# Patient Record
Sex: FEMALE | Race: WHITE | Employment: OTHER | URBAN - METROPOLITAN AREA
[De-identification: names, ages, dates, MRNs, and addresses within clinical notes are randomized per-mention and may not be internally consistent; named-entity substitution may affect disease eponyms.]

---

## 2017-08-23 ENCOUNTER — OFFICE VISIT (OUTPATIENT)
Dept: NEPHROLOGY | Facility: CLINIC | Age: 82
End: 2017-08-23

## 2017-08-23 VITALS — SYSTOLIC BLOOD PRESSURE: 110 MMHG | WEIGHT: 128 LBS | DIASTOLIC BLOOD PRESSURE: 56 MMHG | BODY MASS INDEX: 29 KG/M2

## 2017-08-23 DIAGNOSIS — N18.30 CKD (CHRONIC KIDNEY DISEASE) STAGE 3, GFR 30-59 ML/MIN (HCC): ICD-10-CM

## 2017-08-23 DIAGNOSIS — N17.9 AKI (ACUTE KIDNEY INJURY) (HCC): Primary | ICD-10-CM

## 2017-08-23 PROCEDURE — 99214 OFFICE O/P EST MOD 30 MIN: CPT | Performed by: INTERNAL MEDICINE

## 2017-08-23 NOTE — PROGRESS NOTES
Nephrology Progress Note      ASSESSMENT/PLAN:        1) JOSE ANTONIO- recently higher serum creatinine of 2.6 mg/dL likely reflects prerenal azotemia given her relatively modest p.o. intake and ongoing diuretic use.   No other acute insults noted; meds are otherwis CHOLESTEROL    • Lumbar degenerative disc disease    • Lumbar radiculitis    • Lumbar spondylosis    • Osteoarthrosis, unspecified whether generalized or localized, unspecified site    • Other and unspecified hyperlipidemia    • Pain in limb    • Personal [facility]  7/24/2014: VEGA MONTOYA & Timmy Espinal NDL/CATH SPI DX/THER QSY N/A      Comment: Procedure: LUMBAR EPIDURAL;  Surgeon: Teetee Macedo MD;  Location: [facility] FOR PAIN                MANAGEMENT  No date: HYSTERECTOM Procedure: ;  Surgeon: Tahir Jasso MD;                 Location: [address] [place] [address] MANAGEMENT  7/24/2014: M-SEDAJ BY [facility] [address] .Critical access hospital [facility]  [facility] 5+ YR N/A      Comment: Procedure: LUMBAR EPIDURAL;  Surgeon: Chi Escalona MD;  Location: [facility] ORDER FOR IV ANT*      Comment: Procedure: ;  Surgeon: Khadijah Rice MD;                 Location: [address] [place] [address] MANAGEMENT  7/24/2014[facility] N/A      Comment: Procedure: LUMBAR EPIDURAL;  Surgeon: Mainor Paula, Swelling    ROS:     Denies fever/chills  Denies wt loss/gain  Denies HA or visual changes  Denies CP or palpitations  Denies SOB/cough/hemoptysis  Denies abd or flank pain  Denies N/V/D  Denies change in urinary habits or gross hematuria  Denies LE edema

## 2017-09-06 ENCOUNTER — APPOINTMENT (OUTPATIENT)
Dept: LAB | Age: 82
End: 2017-09-06
Attending: INTERNAL MEDICINE
Payer: MEDICARE

## 2017-09-06 DIAGNOSIS — N17.9 AKI (ACUTE KIDNEY INJURY) (HCC): ICD-10-CM

## 2017-09-06 DIAGNOSIS — N18.30 CKD (CHRONIC KIDNEY DISEASE) STAGE 3, GFR 30-59 ML/MIN (HCC): ICD-10-CM

## 2017-09-06 LAB
BUN BLD-MCNC: 32 MG/DL (ref 8–20)
CALCIUM BLD-MCNC: 10.1 MG/DL (ref 8.3–10.3)
CHLORIDE: 106 MMOL/L (ref 101–111)
CO2: 31 MMOL/L (ref 22–32)
CREAT BLD-MCNC: 2.11 MG/DL (ref 0.55–1.02)
GLUCOSE BLD-MCNC: 118 MG/DL (ref 70–99)
POTASSIUM SERPL-SCNC: 4.6 MMOL/L (ref 3.6–5.1)
SODIUM SERPL-SCNC: 142 MMOL/L (ref 136–144)

## 2017-09-06 PROCEDURE — 80048 BASIC METABOLIC PNL TOTAL CA: CPT

## 2017-09-10 ENCOUNTER — TELEPHONE (OUTPATIENT)
Dept: NEPHROLOGY | Facility: CLINIC | Age: 82
End: 2017-09-10

## 2017-09-11 NOTE — TELEPHONE ENCOUNTER
Disucssed with daughter- renal function improved; lytes OK- no other issues per daughter- thx Waylon Course

## 2017-11-19 ENCOUNTER — APPOINTMENT (OUTPATIENT)
Dept: CT IMAGING | Facility: HOSPITAL | Age: 82
End: 2017-11-19
Payer: MEDICARE

## 2017-11-19 ENCOUNTER — APPOINTMENT (OUTPATIENT)
Dept: GENERAL RADIOLOGY | Facility: HOSPITAL | Age: 82
End: 2017-11-19
Payer: MEDICARE

## 2017-11-19 ENCOUNTER — HOSPITAL ENCOUNTER (EMERGENCY)
Facility: HOSPITAL | Age: 82
Discharge: HOME | End: 2017-11-19
Payer: MEDICARE

## 2017-11-19 VITALS
WEIGHT: 128.06 LBS | TEMPERATURE: 97 F | OXYGEN SATURATION: 92 % | SYSTOLIC BLOOD PRESSURE: 144 MMHG | HEART RATE: 67 BPM | DIASTOLIC BLOOD PRESSURE: 48 MMHG | RESPIRATION RATE: 18 BRPM | HEIGHT: 56 IN | BODY MASS INDEX: 28.81 KG/M2

## 2017-11-19 DIAGNOSIS — I16.0 HYPERTENSIVE URGENCY: Primary | ICD-10-CM

## 2017-11-19 PROCEDURE — 96375 TX/PRO/DX INJ NEW DRUG ADDON: CPT

## 2017-11-19 PROCEDURE — 99285 EMERGENCY DEPT VISIT HI MDM: CPT

## 2017-11-19 PROCEDURE — 93005 ELECTROCARDIOGRAM TRACING: CPT

## 2017-11-19 PROCEDURE — 87086 URINE CULTURE/COLONY COUNT: CPT

## 2017-11-19 PROCEDURE — 81001 URINALYSIS AUTO W/SCOPE: CPT

## 2017-11-19 PROCEDURE — 70450 CT HEAD/BRAIN W/O DYE: CPT

## 2017-11-19 PROCEDURE — 87186 SC STD MICRODIL/AGAR DIL: CPT

## 2017-11-19 PROCEDURE — 96376 TX/PRO/DX INJ SAME DRUG ADON: CPT

## 2017-11-19 PROCEDURE — 71010 XR CHEST AP PORTABLE  (CPT=71010): CPT

## 2017-11-19 PROCEDURE — 85025 COMPLETE CBC W/AUTO DIFF WBC: CPT

## 2017-11-19 PROCEDURE — 93010 ELECTROCARDIOGRAM REPORT: CPT

## 2017-11-19 PROCEDURE — 85610 PROTHROMBIN TIME: CPT

## 2017-11-19 PROCEDURE — 85730 THROMBOPLASTIN TIME PARTIAL: CPT

## 2017-11-19 PROCEDURE — 87088 URINE BACTERIA CULTURE: CPT

## 2017-11-19 PROCEDURE — 96374 THER/PROPH/DIAG INJ IV PUSH: CPT

## 2017-11-19 PROCEDURE — 80053 COMPREHEN METABOLIC PANEL: CPT

## 2017-11-19 PROCEDURE — 84484 ASSAY OF TROPONIN QUANT: CPT

## 2017-11-19 RX ORDER — LORAZEPAM 2 MG/ML
0.25 INJECTION INTRAMUSCULAR ONCE
Status: COMPLETED | OUTPATIENT
Start: 2017-11-19 | End: 2017-11-19

## 2017-11-19 RX ORDER — HYDRALAZINE HYDROCHLORIDE 20 MG/ML
10 INJECTION INTRAMUSCULAR; INTRAVENOUS ONCE
Status: COMPLETED | OUTPATIENT
Start: 2017-11-19 | End: 2017-11-19

## 2017-11-19 NOTE — ED PROVIDER NOTES
Patient Seen in: [facility] Emergency Department    History   Patient presents with:  Blood Pressure    Stated Complaint: high blood pressure 204/74 at home    HPI    Patient is a pleasant 80-year-old female with history of hypertension presented to Other and unspecified hyperlipidemia    • Pain in limb    • Personal history of urinary (tract) infection     below th eknee   • Renal disorder     elevated BUN and creatinine   • Shortness of breath     uses oxygen 2.5 L/NC every night for hx of low satur Location: [facility]                MANAGEMENT  No date: HYSTERECTOMY  1974: HYSTERECTOMY  8/30/2013: INJECTION, W/WO CONTRAST, DX/THERAPEUTIC SUBST*      Comment: Procedure: LUMBAR EPIDURAL;  Surgeon: Boris Nagy MD;  Location: LUMBAR EPIDURAL;  Surgeon: Daron Kulkarni MD;  Location: [address] [place] [address]                MANAGEMENT  8/30/2013: PATIENT DOCUMENTED NOT TO HAVE EXPERIENCED ANY*      Comment: Procedure: LUMBAR EPIDURAL;  Surgeon: Marcio Granados FOR IV ANT* N/A      Comment: Procedure: LUMBAR EPIDURAL;  Surgeon: Mabel Roger MD;  Location: [address] [place] [address]                MANAGEMENT  10/7/13: UPPER GI ENDOSCOPY PERFORMED        Smoking status: Never Smoker Course     Labs Reviewed   COMP METABOLIC PANEL (14) - Abnormal; Notable for the following:        Result Value    Glucose 188 (*)     BUN 38 (*)     Creatinine 2.17 (*)     GFR 19 (*)     Albumin 3.3 (*)     All other components within normal limits   URI Report. No acute ST Elevation or Depression, unremarkable EKG with no change compared to August 2016.   This EKG was repeated, at 3:22 AM, because after I told the patient and her daughter that she was going to be discharged home to follow-up within 50 tk were discussed given the patient's ER course. We discussed signs and symptoms that should prompt the patient's immediate return to the emergency department.    Reasonable over the counter and prescription treatment options and Physician follow up plan was

## 2017-11-19 NOTE — ED INITIAL ASSESSMENT (HPI)
Patient saw her PCP Thursday for annual physical- was told her B/P was high then. Her PCP told her that she can add in 12.5 mg or 25 mg of Metoprolol as needed if B/P remains high at home (in addition to normal morning dose of Metoprolol 12.5 mg).  Daughter

## 2018-04-25 ENCOUNTER — HOSPITAL ENCOUNTER (EMERGENCY)
Facility: HOSPITAL | Age: 83
Discharge: LEFT BEFORE DISCHARGE | End: 2018-04-25
Attending: EMERGENCY MEDICINE
Payer: MEDICARE

## 2018-04-25 ENCOUNTER — APPOINTMENT (OUTPATIENT)
Dept: CT IMAGING | Facility: HOSPITAL | Age: 83
End: 2018-04-25
Attending: EMERGENCY MEDICINE
Payer: MEDICARE

## 2018-04-25 ENCOUNTER — PRIOR ORIGINAL RECORDS (OUTPATIENT)
Dept: OTHER | Age: 83
End: 2018-04-25

## 2018-04-25 VITALS
DIASTOLIC BLOOD PRESSURE: 48 MMHG | TEMPERATURE: 98 F | SYSTOLIC BLOOD PRESSURE: 126 MMHG | WEIGHT: 131 LBS | HEART RATE: 61 BPM | BODY MASS INDEX: 29.47 KG/M2 | OXYGEN SATURATION: 96 % | HEIGHT: 56 IN | RESPIRATION RATE: 18 BRPM

## 2018-04-25 DIAGNOSIS — I16.9 HYPERTENSIVE CRISIS: Primary | ICD-10-CM

## 2018-04-25 PROCEDURE — 93010 ELECTROCARDIOGRAM REPORT: CPT

## 2018-04-25 PROCEDURE — 99285 EMERGENCY DEPT VISIT HI MDM: CPT

## 2018-04-25 PROCEDURE — 99291 CRITICAL CARE FIRST HOUR: CPT

## 2018-04-25 PROCEDURE — 81001 URINALYSIS AUTO W/SCOPE: CPT | Performed by: EMERGENCY MEDICINE

## 2018-04-25 PROCEDURE — 87086 URINE CULTURE/COLONY COUNT: CPT | Performed by: EMERGENCY MEDICINE

## 2018-04-25 PROCEDURE — 85025 COMPLETE CBC W/AUTO DIFF WBC: CPT | Performed by: EMERGENCY MEDICINE

## 2018-04-25 PROCEDURE — 96375 TX/PRO/DX INJ NEW DRUG ADDON: CPT

## 2018-04-25 PROCEDURE — 70450 CT HEAD/BRAIN W/O DYE: CPT | Performed by: EMERGENCY MEDICINE

## 2018-04-25 PROCEDURE — 93005 ELECTROCARDIOGRAM TRACING: CPT

## 2018-04-25 PROCEDURE — 80053 COMPREHEN METABOLIC PANEL: CPT | Performed by: EMERGENCY MEDICINE

## 2018-04-25 PROCEDURE — 84484 ASSAY OF TROPONIN QUANT: CPT | Performed by: EMERGENCY MEDICINE

## 2018-04-25 PROCEDURE — 96365 THER/PROPH/DIAG IV INF INIT: CPT

## 2018-04-25 RX ORDER — CLONIDINE HYDROCHLORIDE 0.1 MG/1
0.1 TABLET ORAL 2 TIMES DAILY
Status: DISCONTINUED | OUTPATIENT
Start: 2018-04-25 | End: 2018-04-25

## 2018-04-25 RX ORDER — HYDRALAZINE HYDROCHLORIDE 20 MG/ML
10 INJECTION INTRAMUSCULAR; INTRAVENOUS ONCE
Status: COMPLETED | OUTPATIENT
Start: 2018-04-25 | End: 2018-04-25

## 2018-04-25 NOTE — ED NOTES
RE-EVAL PER MD WITH DETAILED DISCUSSION ON LABS/RADIOGRAPHICS/FINDINGS. QUESTIONS ANSWERED PER NGA LICONA.  TO BE ADMITTED. IN AGREEMENT WITH POC.

## 2018-04-25 NOTE — ED NOTES
WHILE AWAITING BED ASSIGNMENT, DAUGHTER STATES WANTS TO TAKE MOTHER HME DUE TO B/P NORMALIZING. DETAILED DISCUSSION Michelle Brock MD, THIS RN ON CONSEQUENSCE OF LEAVING. AWARE OF SITUATION AND WILLING TO TAKE RESPONSIBILITY.   DISCHARGE PROCESS INITIATED, S

## 2018-04-25 NOTE — ED PROVIDER NOTES
Patient's daughter at bedside states patient has improved blood pressure at this time and wants to take the patient home.   She is refusing admission to the hospital and wants to take the patient home at this time and have her follow-up with her primary car

## 2018-04-25 NOTE — ED INITIAL ASSESSMENT (HPI)
Per family, /82 at home around 2200, \"she complains of feeling hot and not feeling right. \" Pt given Torsemide 20 mg prn at 2230, \"her BP didn't go down.  \" Pt denies CP, denies SHANNA, denies HA, no c/o dizziness/lightheadedness

## 2018-04-25 NOTE — ED PROVIDER NOTES
Patient Seen in: [facility] Emergency Department    History   Patient presents with:  Hypertension (cardiovascular)    Stated Complaint: HTN     HPI    80year-old woman comes in with elevated blood pressure at home. .  Patient was in her usual state APPENDECTOMY  No date: APPENDECTOMY  08/2010: CATARACT SURGERY, COMPLEX  2/2012: CATH PERCUTANEOUS  TRANSLUMINAL CORONARY ANGIO*      Comment: Normal in [facility]  11/15/2016: COLONOSCOPY N/A      Comment: Procedure: COLONOSCOPY;  Surgeon: Negra Allen Location: [address] [place] [address] Albert Michele                MANAGEMENT  6/24/2014: INJECTION, W/WO CONTRAST, DX/THERAPEUTIC SUBST* N/A      Comment: Procedure: LUMBAR EPIDURAL;  Surgeon: Gretchen Lopez MD;  Location: [place] [address] Violet Shelton MD;  Location: [address] [place] [address] [place]                MANAGEMENT  6/24/2014: PATIENT DOCUMENTED NOT TO HAVE EXPERIENCED ANY*      Comment: Procedure: ;  Surgeon: Yobani Meeks MD;                 Location: [address] [place] [address] [place] MANAGEMENT  7/24/201 (!) 223/68  Pulse: 58  Resp: 18  Temp: 97.6 °F (36.4 °C)  Temp src: Temporal  SpO2: 92 %  O2 Device: None (Room air)    Current:BP (!) 187/55   Pulse 67   Temp 98.1 °F (36.7 °C) (Oral)   Resp 16   Ht 142.2 cm (4' 8\")   Wt 59.4 kg   SpO2 93%   BMI 29.37 kg PLATELET    Narrative: The following orders were created for panel order CBC WITH DIFFERENTIAL WITH PLATELET.   Procedure                               Abnormality         Status                     ---------                               ----------- for these tests on the individual orders. RAINBOW DRAW BLUE   RAINBOW DRAW LAVENDER   RAINBOW DRAW LIGHT GREEN   RAINBOW DRAW GOLD   URINE CULTURE, ROUTINE     CT HEAD WITHOUT CONTRAST      IMPRESSION:    No evidence for acute intracranial abnormality.

## 2018-04-27 ENCOUNTER — PRIOR ORIGINAL RECORDS (OUTPATIENT)
Dept: OTHER | Age: 83
End: 2018-04-27

## 2018-04-30 ENCOUNTER — PRIOR ORIGINAL RECORDS (OUTPATIENT)
Dept: OTHER | Age: 83
End: 2018-04-30

## 2018-05-01 ENCOUNTER — PRIOR ORIGINAL RECORDS (OUTPATIENT)
Dept: OTHER | Age: 83
End: 2018-05-01

## 2018-05-03 ENCOUNTER — APPOINTMENT (OUTPATIENT)
Dept: GENERAL RADIOLOGY | Facility: HOSPITAL | Age: 83
End: 2018-05-03
Attending: EMERGENCY MEDICINE
Payer: MEDICARE

## 2018-05-03 ENCOUNTER — HOSPITAL ENCOUNTER (EMERGENCY)
Facility: HOSPITAL | Age: 83
Discharge: HOME | End: 2018-05-03
Attending: EMERGENCY MEDICINE
Payer: MEDICARE

## 2018-05-03 ENCOUNTER — PRIOR ORIGINAL RECORDS (OUTPATIENT)
Dept: OTHER | Age: 83
End: 2018-05-03

## 2018-05-03 VITALS
WEIGHT: 134.06 LBS | HEART RATE: 52 BPM | SYSTOLIC BLOOD PRESSURE: 158 MMHG | OXYGEN SATURATION: 91 % | BODY MASS INDEX: 30 KG/M2 | RESPIRATION RATE: 14 BRPM | DIASTOLIC BLOOD PRESSURE: 54 MMHG | TEMPERATURE: 97 F

## 2018-05-03 DIAGNOSIS — I10 UNCONTROLLED HYPERTENSION: Primary | ICD-10-CM

## 2018-05-03 PROCEDURE — 96360 HYDRATION IV INFUSION INIT: CPT

## 2018-05-03 PROCEDURE — 93005 ELECTROCARDIOGRAM TRACING: CPT

## 2018-05-03 PROCEDURE — 93010 ELECTROCARDIOGRAM REPORT: CPT

## 2018-05-03 PROCEDURE — 85025 COMPLETE CBC W/AUTO DIFF WBC: CPT | Performed by: EMERGENCY MEDICINE

## 2018-05-03 PROCEDURE — 80053 COMPREHEN METABOLIC PANEL: CPT | Performed by: EMERGENCY MEDICINE

## 2018-05-03 PROCEDURE — 71045 X-RAY EXAM CHEST 1 VIEW: CPT | Performed by: EMERGENCY MEDICINE

## 2018-05-03 PROCEDURE — 84132 ASSAY OF SERUM POTASSIUM: CPT | Performed by: EMERGENCY MEDICINE

## 2018-05-03 PROCEDURE — 99285 EMERGENCY DEPT VISIT HI MDM: CPT

## 2018-05-03 RX ORDER — AMLODIPINE BESYLATE 2.5 MG/1
2.5 TABLET ORAL 2 TIMES DAILY
COMMUNITY
End: 2018-06-04 | Stop reason: DRUGHIGH

## 2018-05-03 RX ORDER — AMLODIPINE BESYLATE 5 MG/1
5 TABLET ORAL 2 TIMES DAILY
Qty: 30 TABLET | Refills: 0 | Status: SHIPPED | OUTPATIENT
Start: 2018-05-03 | End: 2018-09-24

## 2018-05-03 RX ORDER — SODIUM CHLORIDE 9 MG/ML
INJECTION, SOLUTION INTRAVENOUS ONCE
Status: COMPLETED | OUTPATIENT
Start: 2018-05-03 | End: 2018-05-03

## 2018-05-03 NOTE — ED INITIAL ASSESSMENT (HPI)
Patient presents with elevated blood pressure. Her blood pressure was as high as 198/72 at 1 pm today. She had a new medication added by her cardiologist on Tuesday after being seen in the ER last week for elevated blood pressure. Denies chest pain.

## 2018-05-03 NOTE — CONSULTS
[facility]  Report of Consultation    Maco Sandhu Patient Status:  Emergency    1925 MRN [id number]   Location [address] [facility] Attending Marion Carlson MD   [facility] Day # 0 PCP Airam Lakhani May, DO     Reason for Consult APPENDECTOMY  No date: APPENDECTOMY  08/2010: CATARACT SURGERY, COMPLEX  2/2012: CATH PERCUTANEOUS  TRANSLUMINAL CORONARY ANGIO*      Comment: Normal in [facility]  11/15/2016: COLONOSCOPY N/A      Comment: Procedure: COLONOSCOPY;  Surgeon: Marce Araya Location: [address] [place] [address] [place]                MANAGEMENT  6/24/2014: INJECTION, W/WO CONTRAST, DX/THERAPEUTIC SUBST* N/A      Comment: Procedure: LUMBAR EPIDURAL;  Surgeon: Aubree Jamison MD;  Location: [place] [address] General Louis MD;  Location: [address] [place], [place] [address]                MANAGEMENT  6/24/2014: PATIENT DOCUMENTED NOT TO HAVE EXPERIENCED ANY*      Comment: Procedure: ;  Surgeon: Isrrael Veloz MD;                 Location: [address] [place] [address] [place] MANAGEMENT  7/24/201 SWELLING    Medications:  No current facility-administered medications for this encounter. Review of Systems:  All systems were reviewed and are negative except as described above in HPI.     Physical Exam:  Blood pressure (!) 164/54, pulse 55, temperat pressure) < 130/80     Glaucoma     Vitamin D deficiency     Pulmonary hypertension, mild (HCC)     Anemia of chronic disease     Encounter for therapeutic drug monitoring     Lumbar degenerative disc disease     Lumbar spondylosis     Spinal stenosis, lum

## 2018-05-04 NOTE — ED PROVIDER NOTES
Patient Seen in: [facility] Emergency Department    History   Patient presents with:  Hypertension (cardiovascular)    Stated Complaint: hypertension    HPI    Patient presents with hypertension. The patient's daughter gives most of the history.   Jessa Batista unspecified type diabetes mellitus without mention of complication, not stated as uncontrolled    • Unspecified acute reaction to stress    • Unspecified essential hypertension    • Visual impairment     glasses       Past Surgical History:  FEB 2013: [employer] CONTRAST, DX/THERAPEUTIC SUBST*      Comment: Procedure: LUMBAR EPIDURAL;  Surgeon: Amor Curry MD;  Location: [address] [place] [address]                MANAGEMENT  10/8/2013: INJECTION, W/WO CONTRAST, DX/THERAPEUTIC SUBST*      Comment: Proced MANAGEMENT  9/16/2013: PATIENT DOCUMENTED NOT TO HAVE EXPERIENCED ANY*      Comment: Procedure: LUMBAR EPIDURAL;  Surgeon: Zigmund Bloch, MD;  Location: [address] [place] [address]                MANAGEMENT  10/8/2013: PATIENT DOCUMENTED NOT TO HAV Never Used                      Alcohol use: Yes              Comment: rarely      Review of Systems    Positive for stated complaint: hypertension  Other systems are as noted in HPI. Constitutional and vital signs reviewed.       All other systems reviewe DIFFERENTIAL[[id number]]          Abnormal            Final result                 Please view results for these tests on the individual orders.    RAINBOW DRAW BLUE   RAINBOW DRAW LAVENDER   RAINBOW DRAW LIGHT GREEN   RAINBOW DRAW GOLD     EKG    Rate, inte new symptoms, she should return to the emergency department.     Disposition and Plan     Clinical Impression:  Uncontrolled hypertension  (primary encounter diagnosis)    Disposition:  Discharge  5/3/2018  6:37 pm    Follow-up:  Del Harris MD  [address]

## 2018-05-10 LAB
ALBUMIN: 3.6 G/DL
ALKALINE PHOSPHATATE(ALK PHOS): 58 IU/L
BILIRUBIN TOTAL: 0.3 MG/DL
BUN: 34 MG/DL
CALCIUM: 9.7 MG/DL
CHLORIDE: 103 MEQ/L
CREATININE, SERUM: 1.84 MG/DL
GLUCOSE: 144 MG/DL
HEMATOCRIT: 33.4 %
HEMOGLOBIN: 10.9 G/DL
PLATELETS: 109 K/UL
POTASSIUM, SERUM: 3.9 MEQ/L
PROTEIN, TOTAL: 7.7 G/DL
RED BLOOD COUNT: 3.82 X 10-6/U
SGOT (AST): 23 IU/L
SGPT (ALT): 15 IU/L
SODIUM: 139 MEQ/L
WHITE BLOOD COUNT: 4.9 X 10-3/U

## 2018-05-17 ENCOUNTER — HOSPITAL ENCOUNTER (OUTPATIENT)
Dept: CARDIOLOGY CLINIC | Facility: HOSPITAL | Age: 83
Discharge: HOME | End: 2018-05-17
Attending: INTERNAL MEDICINE

## 2018-05-17 ENCOUNTER — PRIOR ORIGINAL RECORDS (OUTPATIENT)
Dept: OTHER | Age: 83
End: 2018-05-17

## 2018-05-17 DIAGNOSIS — I65.23 BILATERAL CAROTID ARTERY STENOSIS: ICD-10-CM

## 2018-05-18 ENCOUNTER — PRIOR ORIGINAL RECORDS (OUTPATIENT)
Dept: OTHER | Age: 83
End: 2018-05-18

## 2018-05-22 ENCOUNTER — PRIOR ORIGINAL RECORDS (OUTPATIENT)
Dept: OTHER | Age: 83
End: 2018-05-22

## 2018-06-05 ENCOUNTER — PRIOR ORIGINAL RECORDS (OUTPATIENT)
Dept: OTHER | Age: 83
End: 2018-06-05

## 2018-06-11 ENCOUNTER — PRIOR ORIGINAL RECORDS (OUTPATIENT)
Dept: OTHER | Age: 83
End: 2018-06-11

## 2018-06-20 ENCOUNTER — PRIOR ORIGINAL RECORDS (OUTPATIENT)
Dept: OTHER | Age: 83
End: 2018-06-20

## 2018-06-25 LAB
ALBUMIN: 3.7 G/DL
ALKALINE PHOSPHATATE(ALK PHOS): 58 IU/L
BILIRUBIN TOTAL: 0.47 MG/DL
BUN: 53 MG/DL
CALCIUM: 9.9 MG/DL
CHLORIDE: 101 MEQ/L
CHOLESTEROL, TOTAL: 214 MG/DL
CREATININE, SERUM: 2.59 MG/DL
GLUCOSE: 140 MG/DL
HDL CHOLESTEROL: 87 MG/DL
LDL CHOLESTEROL: 102 MG/DL
POTASSIUM, SERUM: 4.5 MEQ/L
PROBNP: 565 PG/ML
PROTEIN, TOTAL: 7 G/DL
SGOT (AST): 21 IU/L
SGPT (ALT): 15 IU/L
SODIUM: 142 MEQ/L
TRIGLYCERIDES: 125 MG/DL

## 2018-07-10 ENCOUNTER — OFFICE VISIT (OUTPATIENT)
Dept: NEPHROLOGY | Facility: CLINIC | Age: 83
End: 2018-07-10

## 2018-07-10 VITALS — DIASTOLIC BLOOD PRESSURE: 68 MMHG | BODY MASS INDEX: 31 KG/M2 | WEIGHT: 140 LBS | SYSTOLIC BLOOD PRESSURE: 132 MMHG

## 2018-07-10 DIAGNOSIS — I10 ESSENTIAL HYPERTENSION: ICD-10-CM

## 2018-07-10 DIAGNOSIS — N17.9 AKI (ACUTE KIDNEY INJURY) (HCC): ICD-10-CM

## 2018-07-10 DIAGNOSIS — N18.30 CKD (CHRONIC KIDNEY DISEASE) STAGE 3, GFR 30-59 ML/MIN (HCC): Primary | ICD-10-CM

## 2018-07-10 DIAGNOSIS — R60.9 EDEMA, UNSPECIFIED TYPE: ICD-10-CM

## 2018-07-10 PROCEDURE — 99214 OFFICE O/P EST MOD 30 MIN: CPT | Performed by: INTERNAL MEDICINE

## 2018-07-10 RX ORDER — HYDRALAZINE HYDROCHLORIDE 25 MG/1
25 TABLET, FILM COATED ORAL 2 TIMES DAILY
Qty: 60 TABLET | Refills: 11 | Status: SHIPPED | OUTPATIENT
Start: 2018-07-10 | End: 2019-01-02

## 2018-07-10 RX ORDER — SIMVASTATIN 40 MG
40 TABLET ORAL NIGHTLY
COMMUNITY
End: 2018-09-24

## 2018-07-10 NOTE — PROGRESS NOTES
Nephrology Progress Note      ASSESSMENT/PLAN:        2) CKD 3- serum Cr between 1.6 -> 2.6 mg/dl since 2011 and represents long-standing hypertensive and age-related nephrosclerosis +/- mild diabetic nephropathy and fluctuates depending on intravascular v Anemia    • Back problem     herniated discs L4-L5   • Blood disorder     anemia   • Cellulitis, leg    • Dementia    • Depression    • Elevated blood pressure reading without diagnosis of hypertension    • Esophageal reflux    • Gastritis    • GENITO-URIN & LOCLZJ NDL/CATH SPI DX/THER TIAGO      Comment: Procedure: LUMBAR EPIDURAL;  Surgeon: Yadiel Greenfield MD;  Location: [address] [place] [address]                MANAGEMENT  6/24/2014: [place] MONTOYA & Demi Boykin NDL/CATH SPI DX/THER QJQ      Comment: Procedur MD;  Location: [address] [place] [address] [place]                MANAGEMENT  10/8/2013: ZECHARIAH BY DAVID CRUZ [facility]      Comment: Procedure: LUMBAR EPIDURAL;  Surgeon: Augustin Soni MD;  Location: [address] [place] [address],[address]  6 Kelin Whitten MD;  Location: [address] [place] [address]                MANAGEMENT  10/8/2013: PATIENT Yang Ross PREOPERATIVE ORDER FOR IV ANT*      Comment: Procedure: LUMBAR EPIDURAL;  Surgeon: Gretchen Lopez MD;  Location: [address] [place] [address] mg by mouth as needed.  ) Disp: 90 tablet Rfl: 0   Sertraline HCl 100 MG Oral Tab TAKE ONE TABLET BY MOUTH DAILY . Tonia Brown AVOID GRAPEFRUIT/GRAPEFRUIT JUICE Disp: 90 tablet Rfl: 0   ferrous sulfate 325 (65 FE) MG Oral Tab EC Take 325 mg by mouth daily with breakfa

## 2018-07-16 ENCOUNTER — PRIOR ORIGINAL RECORDS (OUTPATIENT)
Dept: OTHER | Age: 83
End: 2018-07-16

## 2018-08-09 ENCOUNTER — TELEPHONE (OUTPATIENT)
Dept: NEPHROLOGY | Facility: CLINIC | Age: 83
End: 2018-08-09

## 2018-11-05 ENCOUNTER — PRIOR ORIGINAL RECORDS (OUTPATIENT)
Dept: OTHER | Age: 83
End: 2018-11-05

## 2018-11-05 ENCOUNTER — MYAURORA ACCOUNT LINK (OUTPATIENT)
Dept: OTHER | Age: 83
End: 2018-11-05

## 2019-01-03 PROCEDURE — 87088 URINE BACTERIA CULTURE: CPT | Performed by: PHYSICIAN ASSISTANT

## 2019-01-03 PROCEDURE — 87186 SC STD MICRODIL/AGAR DIL: CPT | Performed by: PHYSICIAN ASSISTANT

## 2019-01-03 PROCEDURE — 87086 URINE CULTURE/COLONY COUNT: CPT | Performed by: PHYSICIAN ASSISTANT

## 2019-02-28 VITALS
HEART RATE: 64 BPM | SYSTOLIC BLOOD PRESSURE: 106 MMHG | WEIGHT: 133 LBS | HEIGHT: 56 IN | DIASTOLIC BLOOD PRESSURE: 56 MMHG | BODY MASS INDEX: 29.92 KG/M2

## 2019-02-28 VITALS
BODY MASS INDEX: 30.14 KG/M2 | DIASTOLIC BLOOD PRESSURE: 52 MMHG | SYSTOLIC BLOOD PRESSURE: 164 MMHG | OXYGEN SATURATION: 91 % | HEART RATE: 60 BPM | WEIGHT: 134 LBS | HEIGHT: 56 IN

## 2019-04-18 RX ORDER — SERTRALINE HYDROCHLORIDE 100 MG/1
TABLET, FILM COATED ORAL
COMMUNITY
Start: 2018-04-30

## 2019-04-18 RX ORDER — GABAPENTIN 300 MG/1
CAPSULE ORAL
COMMUNITY
Start: 2018-11-05 | End: 2019-06-05 | Stop reason: SDUPTHER

## 2019-04-18 RX ORDER — ATORVASTATIN CALCIUM 10 MG/1
TABLET, FILM COATED ORAL
COMMUNITY
Start: 2018-07-24 | End: 2019-07-13 | Stop reason: SDUPTHER

## 2019-04-18 RX ORDER — HYDRALAZINE HYDROCHLORIDE 25 MG/1
TABLET, FILM COATED ORAL
COMMUNITY
Start: 2018-11-05 | End: 2019-06-05 | Stop reason: SDUPTHER

## 2019-04-18 RX ORDER — TORSEMIDE 20 MG/1
TABLET ORAL
COMMUNITY
Start: 2018-04-30 | End: 2019-06-05 | Stop reason: SDUPTHER

## 2019-04-18 RX ORDER — LOSARTAN POTASSIUM 25 MG/1
TABLET ORAL
COMMUNITY
Start: 2018-04-30 | End: 2019-06-05 | Stop reason: ALTCHOICE

## 2019-04-18 RX ORDER — PNV NO.95/FERROUS FUM/FOLIC AC 28MG-0.8MG
TABLET ORAL
COMMUNITY
Start: 2018-04-30 | End: 2019-06-05 | Stop reason: SDUPTHER

## 2019-04-18 RX ORDER — ELECTROLYTES/DEXTROSE
SOLUTION, ORAL ORAL
COMMUNITY

## 2019-04-18 RX ORDER — METOPROLOL SUCCINATE 25 MG/1
TABLET, EXTENDED RELEASE ORAL
COMMUNITY
Start: 2018-11-05 | End: 2019-06-05 | Stop reason: SDUPTHER

## 2019-04-30 PROCEDURE — 87186 SC STD MICRODIL/AGAR DIL: CPT | Performed by: UROLOGY

## 2019-04-30 PROCEDURE — 87088 URINE BACTERIA CULTURE: CPT | Performed by: UROLOGY

## 2019-04-30 PROCEDURE — 87086 URINE CULTURE/COLONY COUNT: CPT | Performed by: UROLOGY

## 2019-04-30 PROCEDURE — 81015 MICROSCOPIC EXAM OF URINE: CPT | Performed by: UROLOGY

## 2019-05-17 PROBLEM — M65.331 TRIGGER MIDDLE FINGER OF RIGHT HAND: Status: ACTIVE | Noted: 2019-05-17

## 2019-05-30 PROCEDURE — 87086 URINE CULTURE/COLONY COUNT: CPT | Performed by: PHYSICIAN ASSISTANT

## 2019-05-30 PROCEDURE — 87088 URINE BACTERIA CULTURE: CPT | Performed by: PHYSICIAN ASSISTANT

## 2019-05-30 PROCEDURE — 87186 SC STD MICRODIL/AGAR DIL: CPT | Performed by: PHYSICIAN ASSISTANT

## 2019-05-31 ENCOUNTER — HOSPITAL ENCOUNTER (INPATIENT)
Facility: HOSPITAL | Age: 84
LOS: 3 days | Discharge: HOME | DRG: 690 | End: 2019-06-03
Attending: EMERGENCY MEDICINE | Admitting: INTERNAL MEDICINE
Payer: MEDICARE

## 2019-05-31 ENCOUNTER — APPOINTMENT (OUTPATIENT)
Dept: CV DIAGNOSTICS | Facility: HOSPITAL | Age: 84
DRG: 690 | End: 2019-05-31
Attending: INTERNAL MEDICINE
Payer: MEDICARE

## 2019-05-31 ENCOUNTER — APPOINTMENT (OUTPATIENT)
Dept: GENERAL RADIOLOGY | Facility: HOSPITAL | Age: 84
DRG: 690 | End: 2019-05-31
Attending: EMERGENCY MEDICINE
Payer: MEDICARE

## 2019-05-31 ENCOUNTER — APPOINTMENT (OUTPATIENT)
Dept: CT IMAGING | Facility: HOSPITAL | Age: 84
DRG: 690 | End: 2019-05-31
Attending: EMERGENCY MEDICINE
Payer: MEDICARE

## 2019-05-31 DIAGNOSIS — R77.8 TROPONIN I ABOVE REFERENCE RANGE: Primary | ICD-10-CM

## 2019-05-31 DIAGNOSIS — D64.9 ANEMIA, UNSPECIFIED TYPE: ICD-10-CM

## 2019-05-31 DIAGNOSIS — E11.65 TYPE 2 DIABETES MELLITUS WITH HYPERGLYCEMIA, WITHOUT LONG-TERM CURRENT USE OF INSULIN (HCC): ICD-10-CM

## 2019-05-31 DIAGNOSIS — E86.0 DEHYDRATION: ICD-10-CM

## 2019-05-31 DIAGNOSIS — R55 SYNCOPE AND COLLAPSE: ICD-10-CM

## 2019-05-31 DIAGNOSIS — N18.9 CHRONIC RENAL FAILURE, UNSPECIFIED CKD STAGE: ICD-10-CM

## 2019-05-31 DIAGNOSIS — N39.0 URINARY TRACT INFECTION WITHOUT HEMATURIA, SITE UNSPECIFIED: ICD-10-CM

## 2019-05-31 DIAGNOSIS — D69.6 THROMBOCYTOPENIA (HCC): ICD-10-CM

## 2019-05-31 PROBLEM — R79.89 TROPONIN I ABOVE REFERENCE RANGE: Status: ACTIVE | Noted: 2019-05-31

## 2019-05-31 PROCEDURE — 93010 ELECTROCARDIOGRAM REPORT: CPT

## 2019-05-31 PROCEDURE — 96361 HYDRATE IV INFUSION ADD-ON: CPT

## 2019-05-31 PROCEDURE — 93005 ELECTROCARDIOGRAM TRACING: CPT

## 2019-05-31 PROCEDURE — 70450 CT HEAD/BRAIN W/O DYE: CPT | Performed by: EMERGENCY MEDICINE

## 2019-05-31 PROCEDURE — 85025 COMPLETE CBC W/AUTO DIFF WBC: CPT | Performed by: EMERGENCY MEDICINE

## 2019-05-31 PROCEDURE — 93306 TTE W/DOPPLER COMPLETE: CPT | Performed by: INTERNAL MEDICINE

## 2019-05-31 PROCEDURE — 96365 THER/PROPH/DIAG IV INF INIT: CPT

## 2019-05-31 PROCEDURE — 83036 HEMOGLOBIN GLYCOSYLATED A1C: CPT | Performed by: HOSPITALIST

## 2019-05-31 PROCEDURE — 80053 COMPREHEN METABOLIC PANEL: CPT | Performed by: EMERGENCY MEDICINE

## 2019-05-31 PROCEDURE — 71045 X-RAY EXAM CHEST 1 VIEW: CPT | Performed by: EMERGENCY MEDICINE

## 2019-05-31 PROCEDURE — 81001 URINALYSIS AUTO W/SCOPE: CPT | Performed by: EMERGENCY MEDICINE

## 2019-05-31 PROCEDURE — 99222 1ST HOSP IP/OBS MODERATE 55: CPT | Performed by: INTERNAL MEDICINE

## 2019-05-31 PROCEDURE — 36415 COLL VENOUS BLD VENIPUNCTURE: CPT

## 2019-05-31 PROCEDURE — 99285 EMERGENCY DEPT VISIT HI MDM: CPT

## 2019-05-31 PROCEDURE — 87040 BLOOD CULTURE FOR BACTERIA: CPT | Performed by: EMERGENCY MEDICINE

## 2019-05-31 PROCEDURE — 83605 ASSAY OF LACTIC ACID: CPT | Performed by: EMERGENCY MEDICINE

## 2019-05-31 PROCEDURE — 82962 GLUCOSE BLOOD TEST: CPT

## 2019-05-31 PROCEDURE — 84484 ASSAY OF TROPONIN QUANT: CPT | Performed by: EMERGENCY MEDICINE

## 2019-05-31 PROCEDURE — 82550 ASSAY OF CK (CPK): CPT | Performed by: EMERGENCY MEDICINE

## 2019-05-31 RX ORDER — SODIUM CHLORIDE 9 MG/ML
INJECTION, SOLUTION INTRAVENOUS CONTINUOUS
Status: ACTIVE | OUTPATIENT
Start: 2019-05-31 | End: 2019-05-31

## 2019-05-31 RX ORDER — HYDRALAZINE HYDROCHLORIDE 25 MG/1
25 TABLET, FILM COATED ORAL 2 TIMES DAILY
Status: DISCONTINUED | OUTPATIENT
Start: 2019-05-31 | End: 2019-06-03

## 2019-05-31 RX ORDER — GABAPENTIN 100 MG/1
100 CAPSULE ORAL 3 TIMES DAILY
COMMUNITY
End: 2019-08-20

## 2019-05-31 RX ORDER — GABAPENTIN 100 MG/1
100 CAPSULE ORAL 3 TIMES DAILY
Status: DISCONTINUED | OUTPATIENT
Start: 2019-05-31 | End: 2019-06-03

## 2019-05-31 RX ORDER — LOSARTAN POTASSIUM 25 MG/1
25 TABLET ORAL DAILY
Status: ON HOLD | COMMUNITY
End: 2019-06-03

## 2019-05-31 RX ORDER — SODIUM CHLORIDE 9 MG/ML
INJECTION, SOLUTION INTRAVENOUS CONTINUOUS
Status: DISCONTINUED | OUTPATIENT
Start: 2019-05-31 | End: 2019-06-01

## 2019-05-31 RX ORDER — HEPARIN SODIUM 5000 [USP'U]/ML
5000 INJECTION, SOLUTION INTRAVENOUS; SUBCUTANEOUS EVERY 8 HOURS SCHEDULED
Status: DISCONTINUED | OUTPATIENT
Start: 2019-05-31 | End: 2019-06-03

## 2019-05-31 RX ORDER — ACETAMINOPHEN 325 MG/1
650 TABLET ORAL EVERY 6 HOURS PRN
Status: DISCONTINUED | OUTPATIENT
Start: 2019-05-31 | End: 2019-06-03

## 2019-05-31 RX ORDER — DEXTROSE MONOHYDRATE 25 G/50ML
50 INJECTION, SOLUTION INTRAVENOUS
Status: DISCONTINUED | OUTPATIENT
Start: 2019-05-31 | End: 2019-06-03

## 2019-05-31 RX ORDER — ASPIRIN 325 MG
325 TABLET, DELAYED RELEASE (ENTERIC COATED) ORAL DAILY
Status: DISCONTINUED | OUTPATIENT
Start: 2019-05-31 | End: 2019-06-03

## 2019-05-31 RX ORDER — ATORVASTATIN CALCIUM 10 MG/1
10 TABLET, FILM COATED ORAL NIGHTLY
Status: DISCONTINUED | OUTPATIENT
Start: 2019-05-31 | End: 2019-06-03

## 2019-05-31 RX ORDER — SERTRALINE HYDROCHLORIDE 100 MG/1
100 TABLET, FILM COATED ORAL NIGHTLY
Status: ON HOLD | COMMUNITY
End: 2020-11-20

## 2019-05-31 RX ORDER — TORSEMIDE 20 MG/1
20 TABLET ORAL DAILY PRN
COMMUNITY
End: 2021-03-19

## 2019-05-31 RX ORDER — SERTRALINE HYDROCHLORIDE 100 MG/1
100 TABLET, FILM COATED ORAL NIGHTLY
Status: DISCONTINUED | OUTPATIENT
Start: 2019-05-31 | End: 2019-06-03

## 2019-05-31 NOTE — ED NOTES
Pt awaiting for bed assignment. Asleep and easily awakened. Pt and family aware of pt's admission and all verbalized  understanding. no

## 2019-05-31 NOTE — CONSULTS
[facility] at [address]  Report of Consultation    Ermias Irwin Patient Status:  Emergency    1925 MRN [id number]   Location [address] [facility] Attending Tam Moore MD   [facility] glasses     Past Surgical History:   Procedure Laterality Date   • ANGIOGRAM  FEB 2013   • ANGIOGRAM  Feb 2013    [facility]  [facility]   • CATH PERCUTANEOUS  TRANSLUMINAL CORONARY AN 118/41, pulse 50, temperature 97.7 °F (36.5 °C), temperature source Tympanic, resp. rate 15, weight 134 lb 0.6 oz (60.8 kg), SpO2 99 %, not currently breastfeeding.   Temp (24hrs), Av.7 °F (36.5 °C), Min:97.7 °F (36.5 °C), Max:97.7 °F (36.5 °C)    Wt Re ([address]     Anemia of chronic disease     Encounter for therapeutic drug monitoring     Lumbar degenerative disc disease     Lumbar spondylosis     Spinal stenosis, lumbar region, with neurogenic claudication     Degeneration of lumbar intervertebral disc wi

## 2019-05-31 NOTE — ED INITIAL ASSESSMENT (HPI)
Pt was found on the floor by her family this morning. Pt must have gone to the bathroom and fell on her way back to her bed per family. Pt with abrasions to both knees, left side of her face and forehead. Unwitnessed fall.  Pt was awake on medics arrival. P

## 2019-05-31 NOTE — ED PROVIDER NOTES
Patient Seen in: [facility] Emergency Department    History   Patient presents with:  Fall (musculoskeletal, neurologic)    Stated Complaint: fall    HPI    70-year-old female presents to the emergency department after being found on the ground.   Pe • CATH PERCUTANEOUS  TRANSLUMINAL CORONARY ANGIOPLASTY  2/2012    Normal in [facility]   • COLONOSCOPY N/A 11/15/2016    Performed by Nilam Avilez MD at [facility] ENDOSCOPY   • COLONOSCOPY & POLYPECTOMY  11/15/16= Polyp (serrated)    Repeat PRN   • GASTROSCOPY N/A Eyes: Pupils are equal, round, and reactive to light. EOM are normal.   Neck: Normal range of motion. Neck supple. Cardiovascular: Normal rate, regular rhythm, normal heart sounds and intact distal pulses.    Pulmonary/Chest: Effort normal and breath soun All other components within normal limits   TROPONIN I - Abnormal; Notable for the following components:    Troponin 0.476 (*)     All other components within normal limits   CBC W/ DIFFERENTIAL - Abnormal; Notable for the following components:    RBC 3.3 PROCEDURE:  CT BRAIN OR HEAD (84875)  COMPARISON:  [facility] , CT BRAIN OR HEAD (12103), 4/25/2018, 2:50. INDICATIONS:  fall  TECHNIQUE:  Noncontrast CT scanning is performed through the brain. Dose reduction techniques were used.  Dose information is transmi PROCEDURE:  XR CHEST AP PORTABLE  (CPT=71045)  TECHNIQUE:  AP chest radiograph was obtained. COMPARISON:  [facility] , XR CHEST AP PORTABLE  (CPT=71045), 5/03/2018, 16:23. [facility] , XR CHEST AP PORTABLE  (CPT=71010), 11/19/2017, 0:27.   INDICATIONS:  fall  PAT Disposition and Plan     Clinical Impression:  Troponin I above reference range  (primary encounter diagnosis)  Syncope and collapse  Thrombocytopenia (HCC)  Anemia, unspecified type  Dehydration  Urinary tract infection without hematuria, sit

## 2019-05-31 NOTE — CONSULTS
[facility] Pharmacy Note:  Renal Adjustment for meropenem (Clois Loll)    Cristina Peres is a 80year old female who has been prescribed meropenem (MERREM) 1000 mg every 8 hrs.   CrCl is estimated creatinine clearance is 12.6 mL/min (A) (based on SCr of 2.63 mg/dL

## 2019-05-31 NOTE — H&P
[facility] Hospitalist History and Physical      CC: Fall     PCP: Yovani Saucedo DO    History of Present Illness:     CC: Fall    HPI:    79 yo F found down on floor. Hx of UTI (ESBL). Here found to have mild trop elevation. Started on [place] for UTI.     Ad Disp:  Rfl:    metoprolol Tartrate 25 MG Oral Tab Take 25 mg by mouth daily. Disp:  Rfl:    atorvastatin 10 MG Oral Tab Take 10 mg by mouth nightly. Disp:  Rfl:    hydrALAzine HCl 25 MG Oral Tab Take 1 tablet (25 mg total) by mouth 2 (two) times daily.  D Syncope and collapse    Thrombocytopenia (HCC)    Anemia, unspecified type    Dehydration    Chronic renal failure, unspecified CKD stage    Type 2 diabetes mellitus with hyperglycemia, without long-term current use of insulin (HCC)        A/P: Fall- suspe

## 2019-05-31 NOTE — PLAN OF CARE
Patient admitted from ED at approximately [address] by daughter at bedside   Oriented to hospital unit and room   Patient alert and oriented to self and placed; disoriented to time; h/o dementia- baseline per daughter  Telemetry, Sinus Bradycardia;

## 2019-06-01 PROCEDURE — 82962 GLUCOSE BLOOD TEST: CPT

## 2019-06-01 PROCEDURE — 82550 ASSAY OF CK (CPK): CPT | Performed by: INTERNAL MEDICINE

## 2019-06-01 PROCEDURE — 80048 BASIC METABOLIC PNL TOTAL CA: CPT | Performed by: HOSPITALIST

## 2019-06-01 PROCEDURE — 99232 SBSQ HOSP IP/OBS MODERATE 35: CPT | Performed by: NURSE PRACTITIONER

## 2019-06-01 PROCEDURE — 85025 COMPLETE CBC W/AUTO DIFF WBC: CPT | Performed by: HOSPITALIST

## 2019-06-01 RX ORDER — GABAPENTIN 100 MG/1
100 CAPSULE ORAL 3 TIMES DAILY
Status: DISCONTINUED | OUTPATIENT
Start: 2019-06-01 | End: 2019-06-01

## 2019-06-01 RX ORDER — SODIUM CHLORIDE 9 MG/ML
INJECTION, SOLUTION INTRAVENOUS CONTINUOUS
Status: DISCONTINUED | OUTPATIENT
Start: 2019-06-01 | End: 2019-06-03

## 2019-06-01 NOTE — PROGRESS NOTES
[facility] Hospitalist Progress Note     Pat Ortiz Patient Status:  Inpatient    1925 MRN [id number]   [facility] 7NE-A Attending Cleo Lewis MD   Psychiatric Day # 1 PCP Ricardo Yeh DO     CC: follow up    SUBJECTIVE:  No acute hydrALAzine HCl  25 mg Oral BID   • Heparin Sodium (Porcine)  5,000 Units Subcutaneous Q8H [address]   • gabapentin  100 mg Oral TID     Continuous Infusing Medication:  PRN Medication:glucose **OR** Glucose-Vitamin C **OR** dextrose **OR** glucose **OR** Glucos

## 2019-06-01 NOTE — PLAN OF CARE
Patient awake, alert and oriented to self and place; disoriented to time  Telemetry, Sinus Rhythm   C/o lower back and bilateral knee discomfort; declines pharmacological intervention; repositioned to chair and heat pack applied; patient reports improvemen artery perfusion - ex.  Angina  - Evaluate fluid balance, assess for edema, trend weights  Outcome: Progressing  Goal: Absence of cardiac arrhythmias or at baseline  Description  INTERVENTIONS:  - Continuous cardiac monitoring, monitor vital signs, obtain 1

## 2019-06-01 NOTE — PLAN OF CARE
Assumed care at 1900  AOx3, does not know date, primarily Tajik speaking but understands English  RA- 2.5L at night, 2.5L is patient's baseline at nighttime  SR/SB via tele - HR dropping into lower 40's at night  Briefed overnight for periods of incontin

## 2019-06-01 NOTE — PROGRESS NOTES
· [facility]      Subjective:  Up in chair no pain or dyspnea    Objective:  157/42  138/45  Afebrile  SB/SR PACs, PVCs    Neuro: awake/alert  HEENT: facial eccymosis  Cardiac: S1 S2 regular  Lungs:  clear  Abdomen: soft  Extremities: no edema

## 2019-06-02 ENCOUNTER — APPOINTMENT (OUTPATIENT)
Dept: GENERAL RADIOLOGY | Facility: HOSPITAL | Age: 84
DRG: 690 | End: 2019-06-02
Attending: HOSPITALIST
Payer: MEDICARE

## 2019-06-02 PROCEDURE — 73562 X-RAY EXAM OF KNEE 3: CPT | Performed by: HOSPITALIST

## 2019-06-02 PROCEDURE — 80048 BASIC METABOLIC PNL TOTAL CA: CPT | Performed by: INTERNAL MEDICINE

## 2019-06-02 PROCEDURE — 97116 GAIT TRAINING THERAPY: CPT

## 2019-06-02 PROCEDURE — 85027 COMPLETE CBC AUTOMATED: CPT | Performed by: INTERNAL MEDICINE

## 2019-06-02 PROCEDURE — 97530 THERAPEUTIC ACTIVITIES: CPT

## 2019-06-02 PROCEDURE — 97162 PT EVAL MOD COMPLEX 30 MIN: CPT

## 2019-06-02 PROCEDURE — 82962 GLUCOSE BLOOD TEST: CPT

## 2019-06-02 PROCEDURE — 82550 ASSAY OF CK (CPK): CPT | Performed by: INTERNAL MEDICINE

## 2019-06-02 NOTE — PLAN OF CARE
Assumed care at 1900  AOx3, does not know time  RA-3L overnight  SR/SB via tele  Blood sugar improved at bedtime  Briefed at night for occasional incontinence  Bandaids changed  Up with 1-2 to [facility]  PT to see  IVF infusing  VSS, will continue to monitor Assess for signs and symptoms of hyperglycemia and hypoglycemia  - Administer ordered medications to maintain glucose within target range  - Assess barriers to adequate nutritional intake and initiate nutrition consult as needed  - Instruct patient on self

## 2019-06-02 NOTE — CONSULTS
INFECTIOUS DISEASE CONSULTATION    Jacqueline Mckeon Patient Status:  Inpatient    1925 MRN [id number]   [facility] 7NE-A Attending Manuel Fisher MD   [facility] Day # 2 PCP Jessa Madden APPENDECTOMY     • CATARACT SURGERY, COMPLEX  08/2010   • CATH PERCUTANEOUS  TRANSLUMINAL CORONARY ANGIOPLASTY  2/2012    Normal in [facility]   • COLONOSCOPY N/A 11/15/2016    Performed by Velma Colon MD at [facility] ENDOSCOPY   • COLONOSCOPY & POLYPECTOMY  11/15 Q12H  •  glucose (DEX4) oral liquid 15 g, 15 g, Oral, Q15 Min PRN **OR** Glucose-Vitamin C (DEX-4) 4-6 GM-MG chewable tab 4 tablet, 4 tablet, Oral, Q15 Min PRN **OR** dextrose 50 % injection 50 mL, 50 mL, Intravenous, Q15 Min PRN **OR** glucose (DEX4) oral UNITS Oral Cap Take 1,000 Units by mouth daily. Disp:  Rfl:    Estradiol 0.1 MG/GM Vaginal Cream Place 1 g vaginally daily. Disp: 1 Tube Rfl: 0       Review of Systems:    A comprehensive 10 point review of systems was completed.   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

## 2019-06-02 NOTE — PROGRESS NOTES
[facility] Hospitalist Progress Note     Ashleigh Beverage Patient Status:  Inpatient    1925 MRN [id number]   [facility] 7NE-A Attending Tiffanie Stewart MD   [facility] Day # 2 PCP Clive Baum DO     CC: follow up    SUBJECTIVE:  B/l knee Meds:   Scheduled Medication:  • aspirin EC  325 mg Oral Daily   • atorvastatin  10 mg Oral Nightly   • metoprolol Tartrate  25 mg Oral Daily   • Sertraline HCl  100 mg Oral Nightly   • meropenem  500 mg Intravenous Q12H   • Insulin Aspart Pen  1-5 Uni was discussed with patient, daughter at bedside, RN. Questions/concerns were discussed with patient and/or family by bedside.     Yuridia Urban MD  [facility] Hospitalist  Pager: [phone]

## 2019-06-02 NOTE — PLAN OF CARE
Patient awake, alert and oriented to self and place; disoriented to time; baseline  Telemetry, Sinus Rhythm; Sinus Bradycardia when sleeping HR 40s  C/o bilateral knee pain near abrasions; patient reports some relief with PRN tylenol cold therapy  Xrays co arrhythmias or at baseline  Description  INTERVENTIONS:  - Continuous cardiac monitoring, monitor vital signs, obtain 12 lead EKG if indicated  - Evaluate effectiveness of antiarrhythmic and heart rate control medications as ordered  - Initiate emergency m Patient/Family Long Term Goal  Description  Patient's Long Term Goal: Discharge home    Interventions:  - IV abx  - IVF  - PT  - See additional Care Plan goals for specific interventions   Outcome: Progressing  Goal: Patient/Family Short Term Goal  Descrip Progressing  Goal: Hemodynamic stability and optimal renal function maintained  Description  INTERVENTIONS:  - Monitor labs and assess for signs and symptoms of volume excess or deficit  - Monitor intake, output and patient weight  - Monitor urine specific

## 2019-06-03 VITALS
TEMPERATURE: 98 F | HEART RATE: 54 BPM | DIASTOLIC BLOOD PRESSURE: 37 MMHG | WEIGHT: 134.06 LBS | RESPIRATION RATE: 15 BRPM | OXYGEN SATURATION: 94 % | BODY MASS INDEX: 29 KG/M2 | SYSTOLIC BLOOD PRESSURE: 168 MMHG

## 2019-06-03 PROCEDURE — 82962 GLUCOSE BLOOD TEST: CPT

## 2019-06-03 PROCEDURE — 80048 BASIC METABOLIC PNL TOTAL CA: CPT | Performed by: INTERNAL MEDICINE

## 2019-06-03 PROCEDURE — 97165 OT EVAL LOW COMPLEX 30 MIN: CPT

## 2019-06-03 PROCEDURE — 82550 ASSAY OF CK (CPK): CPT | Performed by: HOSPITALIST

## 2019-06-03 PROCEDURE — 97535 SELF CARE MNGMENT TRAINING: CPT

## 2019-06-03 PROCEDURE — 85025 COMPLETE CBC W/AUTO DIFF WBC: CPT | Performed by: INTERNAL MEDICINE

## 2019-06-03 PROCEDURE — 97530 THERAPEUTIC ACTIVITIES: CPT

## 2019-06-03 PROCEDURE — 97116 GAIT TRAINING THERAPY: CPT

## 2019-06-03 RX ORDER — ASPIRIN 81 MG/1
81 TABLET ORAL DAILY
Qty: 30 TABLET | Refills: 0 | Status: SHIPPED | COMMUNITY
Start: 2019-06-03

## 2019-06-03 NOTE — OCCUPATIONAL THERAPY NOTE
OCCUPATIONAL THERAPY QUICK EVALUATION - INPATIENT    Room Number: 1750/6083-V  Evaluation Date: 6/3/2019     Type of Evaluation: Quick Eval  Presenting Problem: UTI    Physician Order: IP Consult to Occupational Therapy  Reason for Therapy:  ADL/IADL Dysfu • Visual impairment     glasses       Past Surgical History  Past Surgical History:   Procedure Laterality Date   • ANGIOGRAM  FEB 2013   • ANGIOGRAM  Feb 2013    Done in  [facility] Steven  08/ functional limits     NEUROLOGICAL FINDINGS                   ACTIVITY TOLERANCE                         O2 SATURATIONS                ACTIVITIES OF DAILY LIVING ASSESSMENT  AM-PAC ‘6-Clicks’ Inpatient Daily Activity Short Form   How much help from another Assessment/Performance Deficits  LOW - No comorbidities nor modifications of tasks    Clinical Decision Making  LOW - Analysis of occupational profile, problem-focused assessments, limited treatment options    Overall Complexity  LOW     OT Discharge Recom

## 2019-06-03 NOTE — PLAN OF CARE
Assumed care at 1900  AOx3, RA-3L overnight (3L is baseline)  SR/SB via tele  Briefed at night for occasional incontinence  Patient with frequent loose stools  Up with one to bathroom  BP elevated during night  Repositioned often  Will continue to monitor threatening arrhythmias  - Monitor electrolytes and administer replacement therapy as ordered  6/3/2019 0132 by Kim Pathak RN  Outcome: Progressing  6/3/2019 0132 by Kim Pathak RN  Outcome: Progressing     Problem: METABOLIC/FLUID AND ELECTROLYTE Progressing  6/3/2019 0132 by Erna Amezquita RN  Outcome: Progressing

## 2019-06-03 NOTE — CM/SW NOTE
[facility] saw pt because PT was recommending HHPT; however, pt declined HH at this time.

## 2019-06-03 NOTE — PHYSICAL THERAPY NOTE
PHYSICAL THERAPY TREATMENT NOTE - INPATIENT    Room Number: 9108/3815-S     Session: 1   Number of Visits to Meet Established Goals: 5    Presenting Problem: UTI    Problem List  Principal Problem:    Urinary tract infection without hematuria, site unspec PERCUTANEOUS  TRANSLUMINAL CORONARY ANGIOPLASTY  2/2012    Normal in [facility]   • COLONOSCOPY N/A 11/15/2016    Performed by Say Brar MD at [facility] ENDOSCOPY   • COLONOSCOPY & POLYPECTOMY  11/15/16= Polyp (serrated)    Repeat PRN   • GASTROSCOPY N/A 10/7/20 from a bed to a chair (including a wheelchair)?: A Little   -   Need to walk in hospital room?: A Little   -   Climbing 3-5 steps with a railing?: Total       AM-PAC Score:  Raw Score: 19   Approx Degree of Impairment: 41.77%   Standardized Score (AM-PAC S strength, difficulty with gait/transfers resulting in downgrade of overall functional mobility. Due to above deficits, Pt will benefit from continued IP PT, so that patient may achieve highest functional independence/return to baseline.        DISCHARGE RE

## 2019-06-03 NOTE — DISCHARGE SUMMARY
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 INDICATIONS:  fall  TECHNIQUE:  Noncontrast CT scanning is performed through the brain. Dose reduction techniques were used.  Dose information is transmitted to the ACR (Sesar CONCLUSION:  No CHF or pneumonia. Details above.     Dictated by: Forrest Denver, MD on 5/31/2019 at 10:08     Approved by: Forrest Denver, MD               Operative Procedures:      Code Status: Full Code    Total Time Coordinating Care: Edmond

## 2019-06-03 NOTE — HOME CARE LIAISON
TNL REC'D VERBALORDER DURING RDS TO MEET WITH PTNT AND OFFER HH ON DC. TNL MET WITH PTNT AND HER DTR AT BEDSIDE TO DISCUSS HH. DTR STATED THE PTNT LIVES WITH HER AND SHE HAS BEEN TAKING CARE OF HER MOTHER.  SHE STATED AT THIS TIME SHE IS NOT SURE IF [facility] IS N

## 2019-06-04 NOTE — PLAN OF CARE
NURSING DISCHARGE NOTE    Discharged Home via Wheelchair. Accompanied by Family member and Support staff  Belongings Taken by patient/family. Cleared for discharge by all consults. Discharge AVS completed and reviewed with patient and daughter.  Dis by Jennifer Jacobo RN  Outcome: Adequate for Discharge  6/3/2019 1942 by Jennifer Jacobo RN  Outcome: Progressing  Goal: Absence of cardiac arrhythmias or at baseline  Description  INTERVENTIONS:  - Continuous cardiac monitoring, monitor vital signs, obtai Mobility  Goal: Achieve highest/safest level of mobility/gait  Description  Interventions:  - Assess patient's functional ability and stability  - Promote increasing activity/tolerance for mobility and gait  - Educate and engage patient/family in tolerated

## 2019-06-05 ENCOUNTER — OFFICE VISIT (OUTPATIENT)
Dept: CARDIOLOGY | Age: 84
End: 2019-06-05

## 2019-06-05 DIAGNOSIS — N18.9 CHRONIC RENAL FAILURE, UNSPECIFIED CKD STAGE: ICD-10-CM

## 2019-06-05 DIAGNOSIS — Z09 HOSPITAL DISCHARGE FOLLOW-UP: Primary | ICD-10-CM

## 2019-06-05 DIAGNOSIS — I10 ESSENTIAL HYPERTENSION, BENIGN: ICD-10-CM

## 2019-06-05 PROBLEM — I65.23 CAROTID STENOSIS, ASYMPTOMATIC, BILATERAL: Status: ACTIVE | Noted: 2018-04-30

## 2019-06-05 PROBLEM — E78.00 PURE HYPERCHOLESTEROLEMIA: Status: ACTIVE | Noted: 2018-11-05

## 2019-06-05 PROCEDURE — 99214 OFFICE O/P EST MOD 30 MIN: CPT | Performed by: NURSE PRACTITIONER

## 2019-06-05 RX ORDER — ASPIRIN 81 MG/1
81 TABLET ORAL
COMMUNITY
Start: 2019-06-03

## 2019-06-05 RX ORDER — LANOLIN ALCOHOL/MO/W.PET/CERES
325 CREAM (GRAM) TOPICAL
COMMUNITY

## 2019-06-05 RX ORDER — GABAPENTIN 100 MG/1
100 CAPSULE ORAL 3 TIMES DAILY
COMMUNITY

## 2019-06-05 RX ORDER — TORSEMIDE 20 MG/1
20 TABLET ORAL
COMMUNITY

## 2019-06-05 RX ORDER — HYDRALAZINE HYDROCHLORIDE 25 MG/1
25 TABLET, FILM COATED ORAL 2 TIMES DAILY
COMMUNITY
Start: 2019-01-02 | End: 2020-12-11 | Stop reason: CLARIF

## 2019-06-05 SDOH — HEALTH STABILITY: PHYSICAL HEALTH: ON AVERAGE, HOW MANY MINUTES DO YOU ENGAGE IN EXERCISE AT THIS LEVEL?: 0 MIN

## 2019-06-05 SDOH — HEALTH STABILITY: PHYSICAL HEALTH: ON AVERAGE, HOW MANY DAYS PER WEEK DO YOU ENGAGE IN MODERATE TO STRENUOUS EXERCISE (LIKE A BRISK WALK)?: 0 DAYS

## 2019-06-05 ASSESSMENT — ENCOUNTER SYMPTOMS
EYES NEGATIVE: 1
RESPIRATORY NEGATIVE: 1
BACK PAIN: 1
HEMATOLOGIC/LYMPHATIC NEGATIVE: 1
ROS SKIN COMMENTS: FACIAL BRUISING
GASTROINTESTINAL NEGATIVE: 1
NEUROLOGICAL NEGATIVE: 1
ENDOCRINE NEGATIVE: 1

## 2019-06-05 ASSESSMENT — PAIN SCALES - GENERAL: PAINLEVEL: 0

## 2019-07-15 RX ORDER — ATORVASTATIN CALCIUM 10 MG/1
TABLET, FILM COATED ORAL
Qty: 90 TABLET | Refills: 3 | Status: SHIPPED | OUTPATIENT
Start: 2019-07-15 | End: 2020-07-08 | Stop reason: SDUPTHER

## 2019-07-15 RX ORDER — HYDRALAZINE HYDROCHLORIDE 25 MG/1
TABLET, FILM COATED ORAL
Qty: 60 TABLET | Refills: 0 | OUTPATIENT
Start: 2019-07-15

## 2019-08-16 ENCOUNTER — HOSPITAL ENCOUNTER (EMERGENCY)
Facility: HOSPITAL | Age: 84
Discharge: HOME | End: 2019-08-17
Attending: EMERGENCY MEDICINE
Payer: MEDICARE

## 2019-08-16 DIAGNOSIS — N39.0 URINARY TRACT INFECTION WITHOUT HEMATURIA, SITE UNSPECIFIED: Primary | ICD-10-CM

## 2019-08-16 DIAGNOSIS — I10 HYPERTENSION, UNSPECIFIED TYPE: ICD-10-CM

## 2019-08-16 LAB
BASOPHILS # BLD AUTO: 0.02 X10(3) UL (ref 0–0.2)
BASOPHILS NFR BLD AUTO: 0.5 %
DEPRECATED RDW RBC AUTO: 42.2 FL (ref 35.1–46.3)
EOSINOPHIL # BLD AUTO: 0.08 X10(3) UL (ref 0–0.7)
EOSINOPHIL NFR BLD AUTO: 1.9 %
ERYTHROCYTE [DISTWIDTH] IN BLOOD BY AUTOMATED COUNT: 12.9 % (ref 11–15)
HCT VFR BLD AUTO: 33.4 % (ref 35–48)
HGB BLD-MCNC: 10.4 G/DL (ref 12–16)
IMM GRANULOCYTES # BLD AUTO: 0.01 X10(3) UL (ref 0–1)
IMM GRANULOCYTES NFR BLD: 0.2 %
LYMPHOCYTES # BLD AUTO: 1.32 X10(3) UL (ref 1–4)
LYMPHOCYTES NFR BLD AUTO: 30.6 %
MCH RBC QN AUTO: 28.1 PG (ref 26–34)
MCHC RBC AUTO-ENTMCNC: 31.1 G/DL (ref 31–37)
MCV RBC AUTO: 90.3 FL (ref 80–100)
MONOCYTES # BLD AUTO: 0.3 X10(3) UL (ref 0.1–1)
MONOCYTES NFR BLD AUTO: 6.9 %
NEUTROPHILS # BLD AUTO: 2.59 X10 (3) UL (ref 1.5–7.7)
NEUTROPHILS # BLD AUTO: 2.59 X10(3) UL (ref 1.5–7.7)
NEUTROPHILS NFR BLD AUTO: 59.9 %
PLATELET # BLD AUTO: 102 10(3)UL (ref 150–450)
RBC # BLD AUTO: 3.7 X10(6)UL (ref 3.8–5.3)
WBC # BLD AUTO: 4.3 X10(3) UL (ref 4–11)

## 2019-08-16 PROCEDURE — 87186 SC STD MICRODIL/AGAR DIL: CPT | Performed by: EMERGENCY MEDICINE

## 2019-08-16 PROCEDURE — 99285 EMERGENCY DEPT VISIT HI MDM: CPT

## 2019-08-16 PROCEDURE — 85025 COMPLETE CBC W/AUTO DIFF WBC: CPT | Performed by: EMERGENCY MEDICINE

## 2019-08-16 PROCEDURE — 87086 URINE CULTURE/COLONY COUNT: CPT | Performed by: EMERGENCY MEDICINE

## 2019-08-16 PROCEDURE — 84484 ASSAY OF TROPONIN QUANT: CPT | Performed by: EMERGENCY MEDICINE

## 2019-08-16 PROCEDURE — 93010 ELECTROCARDIOGRAM REPORT: CPT

## 2019-08-16 PROCEDURE — 87088 URINE BACTERIA CULTURE: CPT | Performed by: EMERGENCY MEDICINE

## 2019-08-16 PROCEDURE — 81001 URINALYSIS AUTO W/SCOPE: CPT | Performed by: EMERGENCY MEDICINE

## 2019-08-16 PROCEDURE — 80053 COMPREHEN METABOLIC PANEL: CPT | Performed by: EMERGENCY MEDICINE

## 2019-08-16 PROCEDURE — 93005 ELECTROCARDIOGRAM TRACING: CPT

## 2019-08-16 PROCEDURE — 96374 THER/PROPH/DIAG INJ IV PUSH: CPT

## 2019-08-16 RX ORDER — HYDRALAZINE HYDROCHLORIDE 20 MG/ML
10 INJECTION INTRAMUSCULAR; INTRAVENOUS ONCE
Status: COMPLETED | OUTPATIENT
Start: 2019-08-16 | End: 2019-08-17

## 2019-08-16 RX ORDER — LOSARTAN POTASSIUM 25 MG/1
25 TABLET ORAL DAILY
Status: ON HOLD | COMMUNITY
End: 2020-11-20

## 2019-08-17 ENCOUNTER — APPOINTMENT (OUTPATIENT)
Dept: CT IMAGING | Facility: HOSPITAL | Age: 84
End: 2019-08-17
Attending: EMERGENCY MEDICINE
Payer: MEDICARE

## 2019-08-17 VITALS
SYSTOLIC BLOOD PRESSURE: 189 MMHG | TEMPERATURE: 98 F | RESPIRATION RATE: 16 BRPM | DIASTOLIC BLOOD PRESSURE: 56 MMHG | BODY MASS INDEX: 29.47 KG/M2 | WEIGHT: 131 LBS | HEIGHT: 56 IN | OXYGEN SATURATION: 98 % | HEART RATE: 57 BPM

## 2019-08-17 LAB
ALBUMIN SERPL-MCNC: 3.9 G/DL (ref 3.4–5)
ALBUMIN/GLOB SERPL: 1 {RATIO} (ref 1–2)
ALP LIVER SERPL-CCNC: 74 U/L (ref 55–142)
ALT SERPL-CCNC: 20 U/L (ref 13–56)
ANION GAP SERPL CALC-SCNC: 5 MMOL/L (ref 0–18)
AST SERPL-CCNC: 28 U/L (ref 15–37)
ATRIAL RATE: 60 BPM
BILIRUB SERPL-MCNC: 0.3 MG/DL (ref 0.1–2)
BILIRUB UR QL STRIP.AUTO: NEGATIVE
BUN BLD-MCNC: 39 MG/DL (ref 7–18)
BUN/CREAT SERPL: 17 (ref 10–20)
CALCIUM BLD-MCNC: 9.7 MG/DL (ref 8.5–10.1)
CHLORIDE SERPL-SCNC: 103 MMOL/L (ref 98–112)
CLARITY UR REFRACT.AUTO: CLEAR
CO2 SERPL-SCNC: 30 MMOL/L (ref 21–32)
COLOR UR AUTO: YELLOW
CREAT BLD-MCNC: 2.29 MG/DL (ref 0.55–1.02)
GLOBULIN PLAS-MCNC: 3.9 G/DL (ref 2.8–4.4)
GLUCOSE BLD-MCNC: 227 MG/DL (ref 70–99)
GLUCOSE UR STRIP.AUTO-MCNC: 50 MG/DL
KETONES UR STRIP.AUTO-MCNC: NEGATIVE MG/DL
M PROTEIN MFR SERPL ELPH: 7.8 G/DL (ref 6.4–8.2)
NITRITE UR QL STRIP.AUTO: POSITIVE
OSMOLALITY SERPL CALC.SUM OF ELEC: 303 MOSM/KG (ref 275–295)
P AXIS: 13 DEGREES
P-R INTERVAL: 194 MS
PH UR STRIP.AUTO: 7 [PH] (ref 4.5–8)
POTASSIUM SERPL-SCNC: 4.3 MMOL/L (ref 3.5–5.1)
PROT UR STRIP.AUTO-MCNC: 100 MG/DL
Q-T INTERVAL: 404 MS
QRS DURATION: 84 MS
QTC CALCULATION (BEZET): 404 MS
R AXIS: -31 DEGREES
SODIUM SERPL-SCNC: 138 MMOL/L (ref 136–145)
SP GR UR STRIP.AUTO: 1.01 (ref 1–1.03)
T AXIS: 2 DEGREES
TROPONIN I SERPL-MCNC: <0.045 NG/ML (ref ?–0.04)
UROBILINOGEN UR STRIP.AUTO-MCNC: <2 MG/DL
VENTRICULAR RATE: 60 BPM
WBC #/AREA URNS AUTO: >50 /HPF

## 2019-08-17 PROCEDURE — 70450 CT HEAD/BRAIN W/O DYE: CPT | Performed by: EMERGENCY MEDICINE

## 2019-08-17 RX ORDER — SULFAMETHOXAZOLE AND TRIMETHOPRIM 800; 160 MG/1; MG/1
1 TABLET ORAL 2 TIMES DAILY
Qty: 14 TABLET | Refills: 0 | Status: SHIPPED | OUTPATIENT
Start: 2019-08-17 | End: 2019-08-24

## 2019-08-17 RX ORDER — SULFAMETHOXAZOLE AND TRIMETHOPRIM 800; 160 MG/1; MG/1
1 TABLET ORAL ONCE
Status: COMPLETED | OUTPATIENT
Start: 2019-08-17 | End: 2019-08-17

## 2019-08-17 RX ORDER — SULFAMETHOXAZOLE AND TRIMETHOPRIM 800; 160 MG/1; MG/1
TABLET ORAL
Status: COMPLETED
Start: 2019-08-17 | End: 2019-08-17

## 2019-08-17 NOTE — ED PROVIDER NOTES
Patient Seen in: [facility] Emergency Department    History   Patient presents with:  Hypertension (cardiovascular)    Stated Complaint: HTN per family     HPI    Patient is a 60-year-old female with a history of hypertension and multiple oth reaction to stress    • Unspecified essential hypertension    • Visual impairment     glasses              Past Surgical History:   Procedure Laterality Date   • ANGIOGRAM  FEB 2013   • [facility]  Feb 2013    [facility]   • APPEND BMI 29.37 kg/m²         Physical Exam    General: Pleasant elderly female who appears comfortable, conversant, and well appearing. Alert and oriented in no distress. Eyes: EOMI, PERRLA. Neuro: No focal neurologic deficit.   No facial droop or slurred sp components within normal limits   CBC W/ DIFFERENTIAL - Abnormal; Notable for the following components:    RBC 3.70 (*)     HGB 10.4 (*)     HCT 33.4 (*)     .0 (*)     All other components within normal limits   TROPONIN I - Normal   CBC WITH DIFFE volume loss. Atherosclerosis of the intracranial vasculature. Bilateral lens extractions. Patient has been asymptomatic in the emergency department. She was significant hypertensive on arrival with systolic blood pressure in the 220s.   This did

## 2019-08-17 NOTE — ED INITIAL ASSESSMENT (HPI)
Patient was watching TV this evening and when she went to get up she reported \"feeling funny\" was \"wobbly\" per daughter BP was 224/75, pt denies any chest pain.

## 2019-08-26 PROCEDURE — 81001 URINALYSIS AUTO W/SCOPE: CPT | Performed by: FAMILY MEDICINE

## 2019-09-12 PROCEDURE — 87077 CULTURE AEROBIC IDENTIFY: CPT | Performed by: PHYSICIAN ASSISTANT

## 2019-09-12 PROCEDURE — 87086 URINE CULTURE/COLONY COUNT: CPT | Performed by: PHYSICIAN ASSISTANT

## 2019-09-12 PROCEDURE — 87186 SC STD MICRODIL/AGAR DIL: CPT | Performed by: PHYSICIAN ASSISTANT

## 2019-11-04 ENCOUNTER — OFFICE VISIT (OUTPATIENT)
Dept: CARDIOLOGY | Age: 84
End: 2019-11-04

## 2019-11-04 VITALS
HEART RATE: 64 BPM | WEIGHT: 131 LBS | HEIGHT: 56 IN | DIASTOLIC BLOOD PRESSURE: 60 MMHG | SYSTOLIC BLOOD PRESSURE: 132 MMHG | BODY MASS INDEX: 29.47 KG/M2

## 2019-11-04 DIAGNOSIS — I65.23 CAROTID STENOSIS, ASYMPTOMATIC, BILATERAL: ICD-10-CM

## 2019-11-04 DIAGNOSIS — I10 ESSENTIAL HYPERTENSION, BENIGN: Primary | ICD-10-CM

## 2019-11-04 DIAGNOSIS — E78.00 PURE HYPERCHOLESTEROLEMIA: ICD-10-CM

## 2019-11-04 PROCEDURE — 99214 OFFICE O/P EST MOD 30 MIN: CPT | Performed by: INTERNAL MEDICINE

## 2019-11-04 RX ORDER — TROSPIUM CHLORIDE 20 MG/1
20 TABLET, FILM COATED ORAL 2 TIMES DAILY
COMMUNITY
End: 2020-11-01

## 2019-11-04 ASSESSMENT — PATIENT HEALTH QUESTIONNAIRE - PHQ9
1. LITTLE INTEREST OR PLEASURE IN DOING THINGS: NEARLY EVERY DAY
SUM OF ALL RESPONSES TO PHQ9 QUESTIONS 1 AND 2: 6
2. FEELING DOWN, DEPRESSED OR HOPELESS: NEARLY EVERY DAY
SUM OF ALL RESPONSES TO PHQ9 QUESTIONS 1 AND 2: 6

## 2019-11-26 LAB
CHOLEST SERPL-MCNC: 180 MG/DL
CHOLEST/HDLC SERPL: NORMAL {RATIO}
HDLC SERPL-MCNC: 73 MG/DL
LDLC SERPL CALC-MCNC: 81 MG/DL
LENGTH OF FAST TIME PATIENT: NORMAL H
NONHDLC SERPL-MCNC: NORMAL MG/DL
TRIGL SERPL-MCNC: 130 MG/DL
VLDLC SERPL CALC-MCNC: NORMAL MG/DL

## 2019-12-10 ENCOUNTER — TELEPHONE (OUTPATIENT)
Dept: CARDIOLOGY | Age: 84
End: 2019-12-10

## 2019-12-19 ENCOUNTER — CLINICAL ABSTRACT (OUTPATIENT)
Dept: CARDIOLOGY | Age: 84
End: 2019-12-19

## 2019-12-19 DIAGNOSIS — E78.00 PURE HYPERCHOLESTEROLEMIA: ICD-10-CM

## 2020-01-08 ENCOUNTER — TELEPHONE (OUTPATIENT)
Dept: NEPHROLOGY | Facility: CLINIC | Age: 85
End: 2020-01-08

## 2020-01-08 RX ORDER — METOPROLOL SUCCINATE 25 MG/1
25 TABLET, EXTENDED RELEASE ORAL DAILY
Qty: 90 TABLET | Refills: 11 | Status: SHIPPED | OUTPATIENT
Start: 2020-01-08 | End: 2020-02-07

## 2020-07-08 RX ORDER — ATORVASTATIN CALCIUM 10 MG/1
10 TABLET, FILM COATED ORAL DAILY
Qty: 90 TABLET | Refills: 1 | Status: SHIPPED | OUTPATIENT
Start: 2020-07-08 | End: 2021-01-18

## 2020-10-14 ENCOUNTER — TELEPHONE (OUTPATIENT)
Dept: CARDIOLOGY | Age: 85
End: 2020-10-14

## 2020-11-02 ENCOUNTER — APPOINTMENT (OUTPATIENT)
Dept: CARDIOLOGY | Age: 85
End: 2020-11-02

## 2020-11-18 ENCOUNTER — OFFICE VISIT (OUTPATIENT)
Dept: CARDIOLOGY | Age: 85
End: 2020-11-18

## 2020-11-18 VITALS
HEART RATE: 67 BPM | DIASTOLIC BLOOD PRESSURE: 58 MMHG | BODY MASS INDEX: 29.47 KG/M2 | HEIGHT: 56 IN | SYSTOLIC BLOOD PRESSURE: 132 MMHG | WEIGHT: 131 LBS

## 2020-11-18 DIAGNOSIS — I65.23 CAROTID STENOSIS, ASYMPTOMATIC, BILATERAL: ICD-10-CM

## 2020-11-18 DIAGNOSIS — I10 ESSENTIAL HYPERTENSION, BENIGN: ICD-10-CM

## 2020-11-18 DIAGNOSIS — E78.00 PURE HYPERCHOLESTEROLEMIA: Primary | ICD-10-CM

## 2020-11-18 PROCEDURE — 99214 OFFICE O/P EST MOD 30 MIN: CPT | Performed by: NURSE PRACTITIONER

## 2020-11-18 RX ORDER — LOSARTAN POTASSIUM 25 MG/1
25 TABLET ORAL DAILY
COMMUNITY
End: 2020-12-11 | Stop reason: CLARIF

## 2020-11-18 ASSESSMENT — ENCOUNTER SYMPTOMS
DECREASED APPETITE: 0
SHORTNESS OF BREATH: 0
WEIGHT GAIN: 0
SYNCOPE: 0
GASTROINTESTINAL NEGATIVE: 1
DIAPHORESIS: 0
NEUROLOGICAL NEGATIVE: 1
WEIGHT LOSS: 0

## 2020-11-18 ASSESSMENT — PATIENT HEALTH QUESTIONNAIRE - PHQ9
SUM OF ALL RESPONSES TO PHQ9 QUESTIONS 1 AND 2: 0
CLINICAL INTERPRETATION OF PHQ9 SCORE: NO FURTHER SCREENING NEEDED
SUM OF ALL RESPONSES TO PHQ9 QUESTIONS 1 AND 2: 0
CLINICAL INTERPRETATION OF PHQ2 SCORE: NO FURTHER SCREENING NEEDED
2. FEELING DOWN, DEPRESSED OR HOPELESS: NOT AT ALL
1. LITTLE INTEREST OR PLEASURE IN DOING THINGS: NOT AT ALL

## 2020-11-20 ENCOUNTER — HOSPITAL ENCOUNTER (OUTPATIENT)
Facility: HOSPITAL | Age: 85
Setting detail: OBSERVATION
LOS: 1 days | Discharge: HOME | End: 2020-11-22
Attending: STUDENT IN AN ORGANIZED HEALTH CARE EDUCATION/TRAINING PROGRAM | Admitting: INTERNAL MEDICINE
Payer: MEDICARE

## 2020-11-20 DIAGNOSIS — D64.9 ANEMIA, UNSPECIFIED TYPE: ICD-10-CM

## 2020-11-20 DIAGNOSIS — R31.9 URINARY TRACT INFECTION WITH HEMATURIA, SITE UNSPECIFIED: ICD-10-CM

## 2020-11-20 DIAGNOSIS — I10 HYPERTENSION, UNSPECIFIED TYPE: Primary | ICD-10-CM

## 2020-11-20 DIAGNOSIS — N39.0 URINARY TRACT INFECTION WITH HEMATURIA, SITE UNSPECIFIED: ICD-10-CM

## 2020-11-20 PROCEDURE — 99285 EMERGENCY DEPT VISIT HI MDM: CPT

## 2020-11-20 PROCEDURE — 87086 URINE CULTURE/COLONY COUNT: CPT | Performed by: STUDENT IN AN ORGANIZED HEALTH CARE EDUCATION/TRAINING PROGRAM

## 2020-11-20 PROCEDURE — 84484 ASSAY OF TROPONIN QUANT: CPT | Performed by: STUDENT IN AN ORGANIZED HEALTH CARE EDUCATION/TRAINING PROGRAM

## 2020-11-20 PROCEDURE — 87184 SC STD DISK METHOD PER PLATE: CPT | Performed by: STUDENT IN AN ORGANIZED HEALTH CARE EDUCATION/TRAINING PROGRAM

## 2020-11-20 PROCEDURE — 85027 COMPLETE CBC AUTOMATED: CPT | Performed by: HOSPITALIST

## 2020-11-20 PROCEDURE — 83036 HEMOGLOBIN GLYCOSYLATED A1C: CPT | Performed by: HOSPITALIST

## 2020-11-20 PROCEDURE — 85730 THROMBOPLASTIN TIME PARTIAL: CPT | Performed by: STUDENT IN AN ORGANIZED HEALTH CARE EDUCATION/TRAINING PROGRAM

## 2020-11-20 PROCEDURE — 93005 ELECTROCARDIOGRAM TRACING: CPT

## 2020-11-20 PROCEDURE — 99214 OFFICE O/P EST MOD 30 MIN: CPT | Performed by: INTERNAL MEDICINE

## 2020-11-20 PROCEDURE — 82962 GLUCOSE BLOOD TEST: CPT

## 2020-11-20 PROCEDURE — 96375 TX/PRO/DX INJ NEW DRUG ADDON: CPT

## 2020-11-20 PROCEDURE — 87077 CULTURE AEROBIC IDENTIFY: CPT | Performed by: STUDENT IN AN ORGANIZED HEALTH CARE EDUCATION/TRAINING PROGRAM

## 2020-11-20 PROCEDURE — 85025 COMPLETE CBC W/AUTO DIFF WBC: CPT | Performed by: STUDENT IN AN ORGANIZED HEALTH CARE EDUCATION/TRAINING PROGRAM

## 2020-11-20 PROCEDURE — 83880 ASSAY OF NATRIURETIC PEPTIDE: CPT | Performed by: STUDENT IN AN ORGANIZED HEALTH CARE EDUCATION/TRAINING PROGRAM

## 2020-11-20 PROCEDURE — 85610 PROTHROMBIN TIME: CPT | Performed by: STUDENT IN AN ORGANIZED HEALTH CARE EDUCATION/TRAINING PROGRAM

## 2020-11-20 PROCEDURE — 80053 COMPREHEN METABOLIC PANEL: CPT | Performed by: HOSPITALIST

## 2020-11-20 PROCEDURE — 96365 THER/PROPH/DIAG IV INF INIT: CPT

## 2020-11-20 PROCEDURE — 81001 URINALYSIS AUTO W/SCOPE: CPT | Performed by: STUDENT IN AN ORGANIZED HEALTH CARE EDUCATION/TRAINING PROGRAM

## 2020-11-20 PROCEDURE — 93010 ELECTROCARDIOGRAM REPORT: CPT

## 2020-11-20 PROCEDURE — 80053 COMPREHEN METABOLIC PANEL: CPT | Performed by: STUDENT IN AN ORGANIZED HEALTH CARE EDUCATION/TRAINING PROGRAM

## 2020-11-20 PROCEDURE — 87186 SC STD MICRODIL/AGAR DIL: CPT | Performed by: STUDENT IN AN ORGANIZED HEALTH CARE EDUCATION/TRAINING PROGRAM

## 2020-11-20 RX ORDER — BISACODYL 10 MG
10 SUPPOSITORY, RECTAL RECTAL
Status: DISCONTINUED | OUTPATIENT
Start: 2020-11-20 | End: 2020-11-22

## 2020-11-20 RX ORDER — LOSARTAN POTASSIUM 25 MG/1
25 TABLET ORAL DAILY
Status: DISCONTINUED | OUTPATIENT
Start: 2020-11-20 | End: 2020-11-22

## 2020-11-20 RX ORDER — LABETALOL HYDROCHLORIDE 5 MG/ML
5 INJECTION, SOLUTION INTRAVENOUS EVERY 6 HOURS PRN
Status: DISCONTINUED | OUTPATIENT
Start: 2020-11-20 | End: 2020-11-22

## 2020-11-20 RX ORDER — SODIUM PHOSPHATE, DIBASIC AND SODIUM PHOSPHATE, MONOBASIC 7; 19 G/133ML; G/133ML
1 ENEMA RECTAL ONCE AS NEEDED
Status: DISCONTINUED | OUTPATIENT
Start: 2020-11-20 | End: 2020-11-22

## 2020-11-20 RX ORDER — HYDRALAZINE HYDROCHLORIDE 50 MG/1
50 TABLET, FILM COATED ORAL EVERY 8 HOURS SCHEDULED
Status: DISCONTINUED | OUTPATIENT
Start: 2020-11-20 | End: 2020-11-20

## 2020-11-20 RX ORDER — ATORVASTATIN CALCIUM 10 MG/1
10 TABLET, FILM COATED ORAL NIGHTLY
Status: DISCONTINUED | OUTPATIENT
Start: 2020-11-20 | End: 2020-11-22

## 2020-11-20 RX ORDER — METOPROLOL TARTRATE 5 MG/5ML
5 INJECTION INTRAVENOUS ONCE
Status: DISCONTINUED | OUTPATIENT
Start: 2020-11-20 | End: 2020-11-20

## 2020-11-20 RX ORDER — HYDRALAZINE HYDROCHLORIDE 25 MG/1
25 TABLET, FILM COATED ORAL EVERY 8 HOURS SCHEDULED
Status: DISCONTINUED | OUTPATIENT
Start: 2020-11-20 | End: 2020-11-22

## 2020-11-20 RX ORDER — HYDRALAZINE HYDROCHLORIDE 50 MG/1
50 TABLET, FILM COATED ORAL ONCE
Status: COMPLETED | OUTPATIENT
Start: 2020-11-20 | End: 2020-11-20

## 2020-11-20 RX ORDER — TRAMADOL HYDROCHLORIDE 50 MG/1
50 TABLET ORAL EVERY 12 HOURS PRN
Status: DISCONTINUED | OUTPATIENT
Start: 2020-11-20 | End: 2020-11-22

## 2020-11-20 RX ORDER — ASPIRIN 81 MG/1
81 TABLET ORAL DAILY
Status: DISCONTINUED | OUTPATIENT
Start: 2020-11-20 | End: 2020-11-22

## 2020-11-20 RX ORDER — METOCLOPRAMIDE HYDROCHLORIDE 5 MG/ML
5 INJECTION INTRAMUSCULAR; INTRAVENOUS EVERY 8 HOURS PRN
Status: DISCONTINUED | OUTPATIENT
Start: 2020-11-20 | End: 2020-11-22

## 2020-11-20 RX ORDER — GABAPENTIN 100 MG/1
100 CAPSULE ORAL 3 TIMES DAILY
Status: DISCONTINUED | OUTPATIENT
Start: 2020-11-20 | End: 2020-11-22

## 2020-11-20 RX ORDER — ONDANSETRON 2 MG/ML
4 INJECTION INTRAMUSCULAR; INTRAVENOUS EVERY 6 HOURS PRN
Status: DISCONTINUED | OUTPATIENT
Start: 2020-11-20 | End: 2020-11-22

## 2020-11-20 RX ORDER — DEXTROSE MONOHYDRATE 25 G/50ML
50 INJECTION, SOLUTION INTRAVENOUS
Status: DISCONTINUED | OUTPATIENT
Start: 2020-11-20 | End: 2020-11-22

## 2020-11-20 RX ORDER — ACETAMINOPHEN 325 MG/1
650 TABLET ORAL EVERY 6 HOURS PRN
Status: DISCONTINUED | OUTPATIENT
Start: 2020-11-20 | End: 2020-11-22

## 2020-11-20 RX ORDER — HYDRALAZINE HYDROCHLORIDE 20 MG/ML
10 INJECTION INTRAMUSCULAR; INTRAVENOUS ONCE
Status: COMPLETED | OUTPATIENT
Start: 2020-11-20 | End: 2020-11-20

## 2020-11-20 RX ORDER — SERTRALINE HYDROCHLORIDE 100 MG/1
100 TABLET, FILM COATED ORAL NIGHTLY
Status: DISCONTINUED | OUTPATIENT
Start: 2020-11-20 | End: 2020-11-22

## 2020-11-20 RX ORDER — DOCUSATE SODIUM 100 MG/1
100 CAPSULE, LIQUID FILLED ORAL 2 TIMES DAILY
Status: DISCONTINUED | OUTPATIENT
Start: 2020-11-20 | End: 2020-11-22

## 2020-11-20 RX ORDER — POLYETHYLENE GLYCOL 3350 17 G/17G
17 POWDER, FOR SOLUTION ORAL DAILY PRN
Status: DISCONTINUED | OUTPATIENT
Start: 2020-11-20 | End: 2020-11-22

## 2020-11-20 RX ORDER — MELATONIN
325
Status: DISCONTINUED | OUTPATIENT
Start: 2020-11-21 | End: 2020-11-22

## 2020-11-20 RX ORDER — ACETAMINOPHEN 325 MG/1
650 TABLET ORAL ONCE
Status: COMPLETED | OUTPATIENT
Start: 2020-11-20 | End: 2020-11-20

## 2020-11-20 NOTE — ED PROVIDER NOTES
Patient Seen in: [facility] Emergency Department      History   Patient presents with:  Hypertension    Stated Complaint: htn    HPI    Patient is a 79-year-old female who presents emergency department with high blood pressure after waking up from s uses oxygen 2.5 L/NC every night for hx of low saturation identified during sleep study 2014   • Spinal stenosis    • Type II or unspecified type diabetes mellitus without mention of complication, not stated as uncontrolled    • Unspecified acute reacti 96 %   O2 Device 11/20/20 0104 None (Room air)       Current:BP (!) 172/48   Pulse 58   Temp 97.3 °F (36.3 °C) (Temporal)   Resp 18   Wt 60 kg   SpO2 94%   BMI 29.66 kg/m²         Physical Exam    Constitutional: The patient is oriented to person, place, a (*)     All other components within normal limits   COMP METABOLIC PANEL (14) - Abnormal; Notable for the following components:    Glucose 159 (*)     BUN 42 (*)     Creatinine 2.21 (*)     Calculated Osmolality 302 (*)     GFR, Non- 18 (*) elevated creatinine of 2.2. Patient admitted to the hospital for further evaluation and treatment. Case discussed in detail with the admitting physician, who agreed with emergency department management and disposition of the patient.   Admission dispo

## 2020-11-20 NOTE — HISTORICAL OFFICE NOTE
Progress Notes  - documented in this encounter  Ligia Mitchell CNP - 2020 3:00 PM CST  Formatting of this note might be different from the original.  [address]  Cardiovascular Clinic Note    Lorene Young  : 1925  PCP: [facility]. Allergies:  ALLERGIES:   Allergen Reactions   • Bee Venom SWELLING     Medications:  Current Outpatient Medications   Medication Sig Dispense Refill   • losartan (COZAAR) 25 MG tablet Take 25 mg by mouth daily.    • atorvastatin (LIPITOR) 10 MG tabl Normal rate and regular rhythm. Pulmonary/Chest: Effort normal and breath sounds normal.   Abdominal: Soft. Musculoskeletal: Normal range of motion. General: No edema. Neurological: She is alert and oriented to person, place, and time.    Skin: Skin

## 2020-11-20 NOTE — PROGRESS NOTES
11/20/20 1358   Clinical Encounter Type   Visited With Patient and family together  (daughter was present)   Referral From Nurse   Referral To [address] [place] [address]  (Per request, prayed \"our Father. .. \".  Hilda connor s

## 2020-11-20 NOTE — PLAN OF CARE
Report received from ER ROSSY Mccollum. Assumed care of Pt. At 0530. Admission database completed. Pt. Is Axox4 and on room air with an O2 sat of 92%. Pt. Is slightly forgetful at times. Pt. Primarily speaks Nauruan, but understands & speaks [place].  Pt. Has cl Modify environment to reduce risk of injury  - Provide assistive devices as appropriate  - Consider OT/PT consult to assist with strengthening/mobility  - Encourage toileting schedule  Outcome: Progressing     Problem: CARDIOVASCULAR - ADULT  Goal: Dawn Neely

## 2020-11-20 NOTE — ED NOTES
Patient with assistance of this tech and family member ambulated to restroom with wheelchair assistance

## 2020-11-20 NOTE — CONSULTS
[facility]  Report of Consultation    Sandie Lenagapito Patient Status:  Inpatient    1925 MRN [id number]   [facility] 2NE-A Attending Shen Hanna,    Hosp Day # 0 PCP Josué      Reason for Consultation:  HTN/B PERCUTANEOUS  TRANSLUMINAL CORONARY ANGIOPLASTY  2/2012    Normal in [facility]   • COLONOSCOPY N/A 11/15/2016    Performed by Eran Mcdowell MD at [facility] ENDOSCOPY   • COLONOSCOPY & POLYPECTOMY  11/15/16= Polyp (serrated)    Repeat PRN   • GASTROSCOPY N/A 10/7/20 cefTRIAXone Sodium (ROCEPHIN) 1 g in sodium chloride 0.9% 100 mL MBP/ADD-vantage, 1 g, Intravenous, Q24H  •  glucose (DEX4) oral liquid 15 g, 15 g, Oral, Q15 Min PRN **OR** Glucose-Vitamin C (DEX-4) chewable tab 4 tablet, 4 tablet, Oral, Q15 Min PRN **OR** deficits. Neck: No JVD, carotids 2+ no bruits. Cardiac: Regular rate and rhythm, S1, S2 normal, no murmur, rub or gallop. Lungs: Clear without wheezes, rales, rhonchi or dullness. Normal excursions and effort. Abdomen: Soft, non-tender.    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

## 2020-11-20 NOTE — PROGRESS NOTES
11/20/20 0555 11/20/20 0557 11/20/20 0559   Vital Signs   /37 157/36 146/40   MAP (mmHg) 66 69 68   BP Location Right arm Right arm Right arm   BP Method Automatic Automatic Automatic   Patient Position Lying Sitting Standing   Ortho's on admissio

## 2020-11-20 NOTE — H&P
[facility] Hospitalist Team  History and Physical       Assessment/Plan:     #HTN - with urgency  -improved with hydralazine  -cont home losartan  -DC metoprolol given bradycardia, she is in 40-50s currently  -will consult cards since she was supposed to see them Lumbar spondylosis    • Osteoarthrosis, unspecified whether generalized or localized, unspecified site    • Other and unspecified hyperlipidemia    • Pain in limb    • Personal history of urinary (tract) infection     below th eknee   • Renal disorder Smokeless tobacco: Never Used    Alcohol use: Yes      Comment: rarely       Fam Hx  Family History   Problem Relation Age of Onset   • Cancer Father         Esophagus (1964)   • Diabetes Mother    • Hypertension Sister    • Obesity Sister    • Other (janes edema.   Skin: Skin color, texture, turgor normal. No rashes or lesions.     Neurologic: Moving all extremities spontaneously, no focal deficit appreciated     Data Review:    LABS:   Lab Results   Component Value Date    WBC 5.9 11/20/2020    HGB 8.7 11/20

## 2020-11-20 NOTE — ED INITIAL ASSESSMENT (HPI)
Patient arrives from home with family c/o hypertension. states patient woke with c/o headache so checked BP and noted pressure of 220.  Took extras dose of Losartan without improvement approx 1 hr ago

## 2020-11-21 PROCEDURE — 36415 COLL VENOUS BLD VENIPUNCTURE: CPT

## 2020-11-21 PROCEDURE — 99213 OFFICE O/P EST LOW 20 MIN: CPT | Performed by: NURSE PRACTITIONER

## 2020-11-21 PROCEDURE — 82962 GLUCOSE BLOOD TEST: CPT

## 2020-11-21 PROCEDURE — 96366 THER/PROPH/DIAG IV INF ADDON: CPT

## 2020-11-21 RX ORDER — HYDRALAZINE HYDROCHLORIDE 25 MG/1
25 TABLET, FILM COATED ORAL EVERY 8 HOURS SCHEDULED
Qty: 90 TABLET | Refills: 3 | Status: SHIPPED | OUTPATIENT
Start: 2020-11-21 | End: 2020-12-21

## 2020-11-21 NOTE — PLAN OF CARE
Pt denies c/o pain. A/Ox4. Afebrile. Lung sounds clear bilaterally, on room air. SB on telemetry with PACs. 1st degree AVB. Scheduled cardiac medications given. Bowel sounds active. Continent of bowel and bladder. Up x1 walker.  Ambulates in hallway wit bleeding, hypotension and signs of decreased cardiac output  - Evaluate effectiveness of vasoactive medications to optimize hemodynamic stability  - Monitor arterial and/or venous puncture sites for bleeding and/or hematoma  - Assess quality of pulses, ski

## 2020-11-21 NOTE — CM/SW NOTE
Patient failed inpatient criteria. Second level of review completed and supports observation. UR committee in agreement.  approved observation status. CHAUHAN given to the patient's daughter via telephone. Order previously written.

## 2020-11-21 NOTE — PROGRESS NOTES
[facility] Hospitalist Team  Progress Note      Jennyfer Mckeon  2/28/1925    Assessment/Plan:       #HTN - with urgency  -improved with hydralazine  -cont home losartan  -DC metoprolol given bradycardia, she is in 40-50s currently  -cards following     #Bradyc 11/20/20  1640 11/20/20  2135 11/21/20  0747 11/21/20  1221   PGLU 154* 190* 224* 132* 174*       Meds:   Scheduled:   • aspirin  81 mg Oral Daily   • atorvastatin  10 mg Oral Nightly   • gabapentin  100 mg Oral TID   • hydrALAzine HCl  25 mg Oral [facility]

## 2020-11-21 NOTE — PROGRESS NOTES
[facility]  Cardiology Progress Note    Subjective:  No chest pain or shortness of breath.     Objective:  BP (!) 161/40 (BP Location: Right arm)   Pulse 57   Temp 98.6 °F (37 °C) (Oral)   Resp 14   Ht 4' 8\" (1.422 m)   Wt 128 lb 1.6 oz (58.1 kg)   Sp

## 2020-11-21 NOTE — PLAN OF CARE
Pt is A+Ox4. VSS on RA. BPs continue to be mildly elevated, but pt feels much better than she did when she arrived. Tolerating diet. Positive for UTI- awaiting culture. Voids in bathroom. Up with 1A and walker. Pt and daughter updated on POC.  WCTM patient medications to optimize hemodynamic stability  - Monitor arterial and/or venous puncture sites for bleeding and/or hematoma  - Assess quality of pulses, skin color and temperature  - Assess for signs of decreased coronary artery perfusion - ex.  Angina  - E

## 2020-11-21 NOTE — PLAN OF CARE
Assumed care of Pt. At [address]. Pt. Is Axox4 and on room air with an O2 sat of 93%. Pt. Is slightly forgetful at times. Pt. Primarily speaks Romansh, but understands & speaks [place]. Pt. Has clear bilateral breath sounds. Pt. Denies any shortness of breath. appropriate  - Consider OT/PT consult to assist with strengthening/mobility  - Encourage toileting schedule  Outcome: Progressing     Problem: CARDIOVASCULAR - ADULT  Goal: Maintains optimal cardiac output and hemodynamic stability  Description: INTERVENTI

## 2020-11-22 ENCOUNTER — APPOINTMENT (OUTPATIENT)
Dept: CT IMAGING | Facility: HOSPITAL | Age: 85
End: 2020-11-22
Attending: INTERNAL MEDICINE
Payer: MEDICARE

## 2020-11-22 VITALS
RESPIRATION RATE: 20 BRPM | BODY MASS INDEX: 28.82 KG/M2 | TEMPERATURE: 98 F | DIASTOLIC BLOOD PRESSURE: 41 MMHG | HEIGHT: 56 IN | WEIGHT: 128.13 LBS | HEART RATE: 72 BPM | SYSTOLIC BLOOD PRESSURE: 127 MMHG | OXYGEN SATURATION: 94 %

## 2020-11-22 PROCEDURE — 96372 THER/PROPH/DIAG INJ SC/IM: CPT

## 2020-11-22 PROCEDURE — 96376 TX/PRO/DX INJ SAME DRUG ADON: CPT

## 2020-11-22 PROCEDURE — 70450 CT HEAD/BRAIN W/O DYE: CPT | Performed by: INTERNAL MEDICINE

## 2020-11-22 PROCEDURE — 96366 THER/PROPH/DIAG IV INF ADDON: CPT

## 2020-11-22 PROCEDURE — 36415 COLL VENOUS BLD VENIPUNCTURE: CPT

## 2020-11-22 PROCEDURE — 99213 OFFICE O/P EST LOW 20 MIN: CPT | Performed by: NURSE PRACTITIONER

## 2020-11-22 PROCEDURE — 82962 GLUCOSE BLOOD TEST: CPT

## 2020-11-22 RX ORDER — CEPHALEXIN 500 MG/1
500 CAPSULE ORAL 3 TIMES DAILY
Qty: 12 CAPSULE | Refills: 0 | Status: SHIPPED | OUTPATIENT
Start: 2020-11-22 | End: 2020-11-26

## 2020-11-22 RX ORDER — HYDRALAZINE HYDROCHLORIDE 50 MG/1
50 TABLET, FILM COATED ORAL
Status: DISCONTINUED | OUTPATIENT
Start: 2020-11-22 | End: 2020-11-22

## 2020-11-22 RX ORDER — HYDRALAZINE HYDROCHLORIDE 20 MG/ML
10 INJECTION INTRAMUSCULAR; INTRAVENOUS ONCE
Status: COMPLETED | OUTPATIENT
Start: 2020-11-22 | End: 2020-11-22

## 2020-11-22 RX ORDER — NITROFURANTOIN 25; 75 MG/1; MG/1
100 CAPSULE ORAL 2 TIMES DAILY
Qty: 8 CAPSULE | Refills: 0 | Status: SHIPPED | OUTPATIENT
Start: 2020-11-22 | End: 2020-11-22

## 2020-11-22 RX ORDER — GLIMEPIRIDE 2 MG/1
1 TABLET ORAL
Qty: 15 TABLET | Refills: 1 | Status: SHIPPED | OUTPATIENT
Start: 2020-11-22 | End: 2020-12-01

## 2020-11-22 NOTE — PLAN OF CARE
Pt is A+Ox3. Slightly more confused today, daughter very concerned about it. CT head ordered. Neurologically intact otherwise. VSS on RA. NSR on tele. Voids in bathroom. Up with 1A and walker. Awaiting urine culture. Pt and daughter updated on POC.  [facility] pa medications to optimize hemodynamic stability  - Monitor arterial and/or venous puncture sites for bleeding and/or hematoma  - Assess quality of pulses, skin color and temperature  - Assess for signs of decreased coronary artery perfusion - ex.  Angina  - E

## 2020-11-22 NOTE — PLAN OF CARE
Assumed care of Pt. At [address] [facility]. Pt. Is Axox3-4 & forgetful at times and on room air with an O2 sat of 95%. Pt. Has clear bilateral breath sounds. Pt. Denies any shortness of breath. Pt. Has NSR/sinus joel w/ 1st degree AVB w/ HR ranging 50-60's.  Pt. Denies a Instruct pt to call for assistance with activity based on assessment  - Modify environment to reduce risk of injury  - Provide assistive devices as appropriate  - Consider OT/PT consult to assist with strengthening/mobility  - Encourage toileting schedule

## 2020-11-22 NOTE — PROGRESS NOTES
[facility] Hospitalist Team  Progress Note      Nikkie Delgadillo  2/28/1925    Assessment/Plan:       #HTN - with urgency  -improved with hydralazine  -cont home losartan  -DC metoprolol given bradycardia, HR improved  -cards following     #Bradycardia  -dc meto 18 13   AST 25 15   ALB 3.3* 3.1*       Recent Labs   Lab 11/21/20  0747 11/21/20  1221 11/21/20  1722 11/21/20  2111 11/22/20  0745   PGLU 132* 174* 165* 206* 137*       Meds:   Scheduled:   • hydrALAzine HCl  50 mg Oral [email or web address]   • aspirin  81 mg O

## 2020-11-22 NOTE — PROGRESS NOTES
[facility]  Cardiology Progress Note    Subjective:  No chest pain or shortness of breath.     Objective:  /44 (BP Location: Right arm)   Pulse 82   Temp 98.6 °F (37 °C) (Oral)   Resp 19   Ht 4' 8\" (1.422 m)   Wt 128 lb 1.6 oz (58.1 kg)   SpO2 9

## 2020-11-22 NOTE — PROGRESS NOTES
NURSING DISCHARGE NOTE    Discharged Home via Wheelchair. Accompanied by Family member and RN  Belongings Taken by patient/family       Pt is assessed and ready for discharge.  Instructions and follow ups gone over with patient and daughter at bedside,

## 2020-11-22 NOTE — PLAN OF CARE
Problem: PAIN - ADULT  Goal: Verbalizes/displays adequate comfort level or patient's stated pain goal  Description: INTERVENTIONS:  - Encourage pt to monitor pain and request assistance  - Assess pain using appropriate pain scale  - Administer analgesics Monitor arterial and/or venous puncture sites for bleeding and/or hematoma  - Assess quality of pulses, skin color and temperature  - Assess for signs of decreased coronary artery perfusion - ex.  Angina  - Evaluate fluid balance, assess for edema, trend we

## 2020-11-23 NOTE — DISCHARGE SUMMARY
General Medicine Discharge Summary     Patient ID:  Dwayne Section year old  2/28/1925    Admit date: 11/20/2020    Discharge date and time: 11/22/2020  4:42 PM     Attending Physician: Felicia Velasco remission- cont ssri       Consults: IP CONSULT TO SPIRITUAL CARE  IP CONSULT TO CARDIOLOGY    Operative Procedures:        Patient instructions:      Discharge Medication List as of 11/22/2020  2:17 PM    START taking these medications    glimepiride 2 MG Tab    METFORMIN HCL 1000 MG Oral Tab    traMADol HCl 50 MG Oral Tab          Activity: activity as tolerated  Diet: cardiac diet and diabetic diet  Wound Care: as directed  Code Status: Full Code      Exam on day of discharge:      11/22/20  1403   BP:

## 2020-12-01 PROBLEM — L89.892 PRESSURE INJURY OF LEFT KNEE, STAGE 2 (HCC): Status: ACTIVE | Noted: 2020-12-01

## 2020-12-11 ENCOUNTER — OFFICE VISIT (OUTPATIENT)
Dept: CARDIOLOGY | Age: 85
End: 2020-12-11

## 2020-12-11 VITALS
HEART RATE: 72 BPM | BODY MASS INDEX: 29.02 KG/M2 | SYSTOLIC BLOOD PRESSURE: 126 MMHG | WEIGHT: 129 LBS | HEIGHT: 56 IN | DIASTOLIC BLOOD PRESSURE: 60 MMHG

## 2020-12-11 DIAGNOSIS — Z09 HOSPITAL DISCHARGE FOLLOW-UP: Primary | ICD-10-CM

## 2020-12-11 DIAGNOSIS — I10 ESSENTIAL HYPERTENSION, BENIGN: ICD-10-CM

## 2020-12-11 PROCEDURE — 99213 OFFICE O/P EST LOW 20 MIN: CPT | Performed by: NURSE PRACTITIONER

## 2020-12-11 RX ORDER — HYDRALAZINE HYDROCHLORIDE 25 MG/1
25 TABLET, FILM COATED ORAL 3 TIMES DAILY
COMMUNITY
Start: 2020-11-21 | End: 2020-12-11 | Stop reason: SDUPTHER

## 2020-12-11 RX ORDER — ESTRADIOL 0.1 MG/G
CREAM VAGINAL
COMMUNITY
Start: 2020-10-14

## 2020-12-11 RX ORDER — GLIMEPIRIDE 1 MG/1
1 TABLET ORAL DAILY
COMMUNITY
Start: 2020-12-01 | End: 2021-03-01

## 2020-12-11 RX ORDER — HYDRALAZINE HYDROCHLORIDE 25 MG/1
50 TABLET, FILM COATED ORAL 3 TIMES DAILY
Status: SHIPPED | COMMUNITY
Start: 2020-12-11

## 2020-12-11 RX ORDER — LOSARTAN POTASSIUM 50 MG/1
50 TABLET ORAL DAILY
COMMUNITY
Start: 2020-12-01

## 2020-12-11 RX ORDER — TRAMADOL HYDROCHLORIDE 50 MG/1
TABLET ORAL
COMMUNITY
Start: 2020-12-01

## 2020-12-11 ASSESSMENT — PATIENT HEALTH QUESTIONNAIRE - PHQ9
CLINICAL INTERPRETATION OF PHQ2 SCORE: NO FURTHER SCREENING NEEDED
2. FEELING DOWN, DEPRESSED OR HOPELESS: NOT AT ALL
SUM OF ALL RESPONSES TO PHQ9 QUESTIONS 1 AND 2: 0
SUM OF ALL RESPONSES TO PHQ9 QUESTIONS 1 AND 2: 0
CLINICAL INTERPRETATION OF PHQ9 SCORE: NO FURTHER SCREENING NEEDED
1. LITTLE INTEREST OR PLEASURE IN DOING THINGS: NOT AT ALL

## 2020-12-21 PROBLEM — Z09 HOSPITAL DISCHARGE FOLLOW-UP: Status: ACTIVE | Noted: 2020-12-21

## 2020-12-21 ASSESSMENT — ENCOUNTER SYMPTOMS
ORTHOPNEA: 0
SYNCOPE: 0
ABDOMINAL PAIN: 0
SHORTNESS OF BREATH: 0
FEVER: 0
BLOATING: 0
COUGH: 0
NIGHT SWEATS: 0
NEAR-SYNCOPE: 0

## 2021-01-18 RX ORDER — ATORVASTATIN CALCIUM 10 MG/1
10 TABLET, FILM COATED ORAL DAILY
Qty: 90 TABLET | Refills: 3 | Status: SHIPPED | OUTPATIENT
Start: 2021-01-18

## 2021-01-27 DIAGNOSIS — Z23 NEED FOR VACCINATION: ICD-10-CM

## 2021-02-12 PROBLEM — M48.061 LUMBAR FORAMINAL STENOSIS: Status: ACTIVE | Noted: 2021-02-12

## 2021-03-19 ENCOUNTER — APPOINTMENT (OUTPATIENT)
Dept: GENERAL RADIOLOGY | Facility: HOSPITAL | Age: 86
DRG: 690 | End: 2021-03-19
Attending: EMERGENCY MEDICINE
Payer: MEDICARE

## 2021-03-19 ENCOUNTER — APPOINTMENT (OUTPATIENT)
Dept: NUCLEAR MEDICINE | Facility: HOSPITAL | Age: 86
DRG: 690 | End: 2021-03-19
Attending: EMERGENCY MEDICINE
Payer: MEDICARE

## 2021-03-19 ENCOUNTER — HOSPITAL ENCOUNTER (INPATIENT)
Facility: HOSPITAL | Age: 86
LOS: 3 days | Discharge: HOME | DRG: 690 | End: 2021-03-22
Attending: EMERGENCY MEDICINE | Admitting: INTERNAL MEDICINE
Payer: MEDICARE

## 2021-03-19 ENCOUNTER — APPOINTMENT (OUTPATIENT)
Dept: CT IMAGING | Facility: HOSPITAL | Age: 86
DRG: 690 | End: 2021-03-19
Attending: EMERGENCY MEDICINE
Payer: MEDICARE

## 2021-03-19 ENCOUNTER — APPOINTMENT (OUTPATIENT)
Dept: ULTRASOUND IMAGING | Facility: HOSPITAL | Age: 86
DRG: 690 | End: 2021-03-19
Attending: EMERGENCY MEDICINE
Payer: MEDICARE

## 2021-03-19 DIAGNOSIS — M25.561 CHRONIC PAIN OF BOTH KNEES: ICD-10-CM

## 2021-03-19 DIAGNOSIS — M25.562 CHRONIC PAIN OF BOTH KNEES: ICD-10-CM

## 2021-03-19 DIAGNOSIS — N39.0 URINARY TRACT INFECTION WITHOUT HEMATURIA, SITE UNSPECIFIED: Primary | ICD-10-CM

## 2021-03-19 DIAGNOSIS — G89.29 CHRONIC PAIN OF BOTH KNEES: ICD-10-CM

## 2021-03-19 DIAGNOSIS — R41.0 ACUTE CONFUSION: ICD-10-CM

## 2021-03-19 DIAGNOSIS — E86.0 DEHYDRATION: ICD-10-CM

## 2021-03-19 DIAGNOSIS — R09.02 HYPOXIA: ICD-10-CM

## 2021-03-19 PROCEDURE — 78580 LUNG PERFUSION IMAGING: CPT | Performed by: EMERGENCY MEDICINE

## 2021-03-19 PROCEDURE — 93971 EXTREMITY STUDY: CPT | Performed by: EMERGENCY MEDICINE

## 2021-03-19 PROCEDURE — 71045 X-RAY EXAM CHEST 1 VIEW: CPT | Performed by: EMERGENCY MEDICINE

## 2021-03-19 PROCEDURE — 70450 CT HEAD/BRAIN W/O DYE: CPT | Performed by: EMERGENCY MEDICINE

## 2021-03-19 RX ORDER — GABAPENTIN 100 MG/1
100 CAPSULE ORAL 3 TIMES DAILY
COMMUNITY
End: 2021-09-08

## 2021-03-19 RX ORDER — MULTIVITAMIN WITH FOLIC ACID 400 MCG
1 TABLET ORAL DAILY
COMMUNITY

## 2021-03-19 RX ORDER — GABAPENTIN 100 MG/1
100 CAPSULE ORAL 3 TIMES DAILY
Status: DISCONTINUED | OUTPATIENT
Start: 2021-03-19 | End: 2021-03-22

## 2021-03-19 RX ORDER — ATORVASTATIN CALCIUM 10 MG/1
10 TABLET, FILM COATED ORAL NIGHTLY
Status: DISCONTINUED | OUTPATIENT
Start: 2021-03-19 | End: 2021-03-22

## 2021-03-19 RX ORDER — METOCLOPRAMIDE HYDROCHLORIDE 5 MG/ML
5 INJECTION INTRAMUSCULAR; INTRAVENOUS EVERY 8 HOURS PRN
Status: DISCONTINUED | OUTPATIENT
Start: 2021-03-19 | End: 2021-03-22

## 2021-03-19 RX ORDER — SERTRALINE HYDROCHLORIDE 100 MG/1
100 TABLET, FILM COATED ORAL DAILY
Status: DISCONTINUED | OUTPATIENT
Start: 2021-03-20 | End: 2021-03-22

## 2021-03-19 RX ORDER — DEXTROSE MONOHYDRATE 25 G/50ML
50 INJECTION, SOLUTION INTRAVENOUS
Status: DISCONTINUED | OUTPATIENT
Start: 2021-03-19 | End: 2021-03-22

## 2021-03-19 RX ORDER — SODIUM CHLORIDE 9 MG/ML
1000 INJECTION, SOLUTION INTRAVENOUS ONCE
Status: COMPLETED | OUTPATIENT
Start: 2021-03-19 | End: 2021-03-19

## 2021-03-19 RX ORDER — TRAMADOL HYDROCHLORIDE 50 MG/1
50 TABLET ORAL EVERY 12 HOURS PRN
Status: DISCONTINUED | OUTPATIENT
Start: 2021-03-19 | End: 2021-03-22

## 2021-03-19 RX ORDER — HYDROMORPHONE HYDROCHLORIDE 1 MG/ML
INJECTION, SOLUTION INTRAMUSCULAR; INTRAVENOUS; SUBCUTANEOUS
Status: DISCONTINUED
Start: 2021-03-19 | End: 2021-03-19 | Stop reason: WASHOUT

## 2021-03-19 RX ORDER — BIOTIN 1 MG
1 TABLET ORAL DAILY
COMMUNITY

## 2021-03-19 RX ORDER — TRAMADOL HYDROCHLORIDE 50 MG/1
25 TABLET ORAL EVERY 12 HOURS PRN
Status: DISCONTINUED | OUTPATIENT
Start: 2021-03-19 | End: 2021-03-22

## 2021-03-19 RX ORDER — MELATONIN
325
Status: DISCONTINUED | OUTPATIENT
Start: 2021-03-20 | End: 2021-03-22

## 2021-03-19 RX ORDER — GLIMEPIRIDE 1 MG/1
1 TABLET ORAL
COMMUNITY
End: 2021-06-08

## 2021-03-19 RX ORDER — ONDANSETRON 2 MG/ML
4 INJECTION INTRAMUSCULAR; INTRAVENOUS EVERY 6 HOURS PRN
Status: DISCONTINUED | OUTPATIENT
Start: 2021-03-19 | End: 2021-03-22

## 2021-03-19 RX ORDER — SERTRALINE HYDROCHLORIDE 100 MG/1
100 TABLET, FILM COATED ORAL DAILY
COMMUNITY
End: 2021-04-21

## 2021-03-19 RX ORDER — TRAMADOL HYDROCHLORIDE 50 MG/1
TABLET ORAL EVERY 8 HOURS PRN
Status: DISCONTINUED | OUTPATIENT
Start: 2021-03-19 | End: 2021-03-19 | Stop reason: SDUPTHER

## 2021-03-19 RX ORDER — ATORVASTATIN CALCIUM 10 MG/1
10 TABLET, FILM COATED ORAL NIGHTLY
COMMUNITY
End: 2021-03-19

## 2021-03-19 RX ORDER — VIT A/VIT C/VIT E/ZINC/COPPER 2148-113
1 TABLET ORAL 2 TIMES DAILY
COMMUNITY

## 2021-03-19 RX ORDER — LOSARTAN POTASSIUM 50 MG/1
50 TABLET ORAL DAILY
Status: DISCONTINUED | OUTPATIENT
Start: 2021-03-20 | End: 2021-03-19

## 2021-03-19 RX ORDER — SODIUM CHLORIDE 9 MG/ML
INJECTION, SOLUTION INTRAVENOUS CONTINUOUS
Status: DISCONTINUED | OUTPATIENT
Start: 2021-03-19 | End: 2021-03-20

## 2021-03-19 RX ORDER — LOSARTAN POTASSIUM 50 MG/1
50 TABLET ORAL DAILY
COMMUNITY
End: 2021-06-08

## 2021-03-19 RX ORDER — ASPIRIN 81 MG/1
81 TABLET ORAL DAILY
Status: DISCONTINUED | OUTPATIENT
Start: 2021-03-20 | End: 2021-03-22

## 2021-03-19 RX ORDER — HYDRALAZINE HYDROCHLORIDE 25 MG/1
25 TABLET, FILM COATED ORAL EVERY 8 HOURS SCHEDULED
Status: DISCONTINUED | OUTPATIENT
Start: 2021-03-19 | End: 2021-03-22

## 2021-03-19 RX ORDER — TRAMADOL HYDROCHLORIDE 50 MG/1
TABLET ORAL EVERY 8 HOURS PRN
COMMUNITY

## 2021-03-19 RX ORDER — HEPARIN SODIUM 5000 [USP'U]/ML
5000 INJECTION, SOLUTION INTRAVENOUS; SUBCUTANEOUS EVERY 8 HOURS SCHEDULED
Status: DISCONTINUED | OUTPATIENT
Start: 2021-03-19 | End: 2021-03-22

## 2021-03-19 NOTE — ED INITIAL ASSESSMENT (HPI)
Patient has been more confused, lethargy, and difficulty with fine motor movements beginning yesterday. Patient normally has difficulty with short term memory.

## 2021-03-19 NOTE — ED PROVIDER NOTES
Patient Seen in: [facility] Emergency Department      History   Patient presents with:  Altered Mental Status  Numbness Weakness    Stated Complaint: increased confusion over past day, weakness, difficulty with fine motor movemen*    HPI/Subjective: • Lumbar spondylosis    • Osteoarthrosis, unspecified whether generalized or localized, unspecified site    • Other and unspecified hyperlipidemia    • Pain in limb    • Personal history of urinary (tract) infection     below th eknee   • Renal disorder use: No             Review of Systems    Positive for stated complaint: increased confusion over past day, weakness, difficulty with fine motor movemen*  Other systems are as noted in HPI. Constitutional and vital signs reviewed.       All other systems re normal limits   PRO BETA NATRIURETIC PEPTIDE - Abnormal; Notable for the following components:    Pro-Beta Natriuretic Peptide 648 (*)     All other components within normal limits   CBC W/ DIFFERENTIAL - Abnormal; Notable for the following components: VENTRICLES/SULCI:  Ventricles and sulci are prominent compatible with atrophy. INTRACRANIAL:  Multifocal decreased attenuation in the periventricular white matter is consistent with chronic small vessel ischemic change.   There is no evidence of acute ische thus does not know if this low reading is more chronic or acute    RLE U/S:  No DVT      Spoke with Dr. Mick Washington. Plan admit for confusion most likely related to UTI. Hypoxia, unclear if acute or chronic and whether contributing to current symptoms.   VQ

## 2021-03-19 NOTE — H&P
[facility] Hospitalist Team  History and Physical       Assessment/Plan:     #acute encephalopathy  -likely 2/2 UTI, has happened in the past  -ct brain no acute findings  -PT/OT eval    #UTI, h/o recurrent   -culture pending, cont iv ceftriaxone    #Hypoxia  - u site    • Other and unspecified hyperlipidemia    • Pain in limb    • Personal history of urinary (tract) infection     below th eknee   • Renal disorder     elevated BUN and creatinine   • Shortness of breath     uses oxygen 2.5 L/NC every night for hx of Problem Relation Age of Onset   • Cancer Father         Esophagus (1964)   • Diabetes Mother    • Hypertension Sister    • Obesity Sister    • Other (gastric cancer) Sister    • Other (malignant pancreatic neoplasm) Sister    • Cancer Sister         Gordon spontaneously, no focal deficit appreciated     Data Review:    LABS:   Lab Results   Component Value Date    WBC 4.6 03/19/2021    HGB 8.0 03/19/2021    HCT 26.2 03/19/2021    .0 03/19/2021    CREATSERUM 3.00 03/19/2021    BUN 59 03/19/2021    NA 1 SKULL:             No evidence for fracture or osseous abnormality. OTHER:             None. CONCLUSION:  There is chronic small vessel ischemic change and atrophy noted.   There is no evidence of an acute abnormality on the noncontrast CT of the 3/19/2021, 11:42 AM.  INDICATIONS:  eval for PE  TECHNIQUE:  Tc-99m MAA was injected intravenously and images subsequently obtained.   PHARMACEUTICAL:  5.4 mCi Tc-99m MAA  FINDINGS:  Suspect small subtle perfusion defect in the mid posterior right lower lob span two midnight's based on the clinical documentation in H+P. Based on patients current state of illness, I anticipate that, after discharge, patient will require TBD.

## 2021-03-19 NOTE — PROGRESS NOTES
[facility] Pharmacy Note:  Renal Dose Adjustment for Metoclopramide (REGLAN)    Catherine Pedraza has been prescribed Metoclopramide (REGLAN) 10 mg every 8 hours as needed for nausea/vomiting.     Estimated Creatinine Clearance: 10.2 mL/min (A) (based on SCr of 3

## 2021-03-20 PROCEDURE — 99223 1ST HOSP IP/OBS HIGH 75: CPT | Performed by: INTERNAL MEDICINE

## 2021-03-20 NOTE — PHYSICAL THERAPY NOTE
PHYSICAL THERAPY EVALUATION - INPATIENT     Room Number: 423/423-A  Evaluation Date: 3/20/2021  Type of Evaluation: Initial  Physician Order: PT Eval and Treat    Presenting Problem: UTI, dehydration  Reason for Therapy: Mobility Dysfunction and Disc limb    • Personal history of urinary (tract) infection     below th eknee   • Renal disorder     elevated BUN and creatinine   • Shortness of breath     uses oxygen 2.5 L/NC every night for hx of low saturation identified during sleep study 2014   • Spina provides history. Pt up until one month ago was ind with gait in home without assist. Over last month has been declining requiring either cane or walker. Most recently with  walker. Pt typically dresses self.  Dtr reports pt was to have MRI spine ousmane wheelchair, bedside commode, etc.): A Little   -   Moving from lying on back to sitting on the side of the bed?: A Little   How much help from another person does the patient currently need. ..   -   Moving to and from a bed to a chair (including a wheelcha training  Posture  Transfer training    Patient End of Session: Up in chair;Needs met;Call light within reach;RN aware of session/findings; All patient questions and concerns addressed; Family present (alarm set, dtr does not want activated, will tell RN whe Visits to Meet Established Goals: 3      CURRENT GOALS    Goal #1 Patient is able to demonstrate supine - sit EOB @ level: supervision     Goal #2 Patient is able to demonstrate transfers EOB to/from Chair/Wheelchair at assistance level: supervision     Go

## 2021-03-20 NOTE — PROGRESS NOTES
[facility] Hospitalist Progress Note     PCP: Alfa Jay DO    Chief Complaint: follow-up    Overnight/Interim Events:      SUBJECTIVE:  lAYING IN BED, FEELS OK. Dtr concerned about twitching. No f/c. No cough. No melena. Breathing ok.  127/39    OBJECTIVE 03/20/21  0807 03/20/21  1155 03/20/21  1717   PGLU 154* 72 130* 89       Recent Labs   Lab 03/19/21  1215   TROP <0.045         Meds:     • Heparin Sodium (Porcine)  5,000 Units Subcutaneous Q8H [address]   • Insulin Aspart Pen  1-5 Units Subcutaneous TID CC an

## 2021-03-20 NOTE — CONSULTS
[facility]  Report of Consultation    iLlliana Barajas Patient Status:  Inpatient    1925 MRN [id number]   [facility] 4NW-A Attending Willie Magallon MD   [facility] Day # 1 PCP Rosemary Dudley DO     Reason for Consultation:  Jayda Coto uncontrolled    • Unspecified acute reaction to stress    • Unspecified essential hypertension    • Visual impairment     glasses     Past Surgical History:   Procedure Laterality Date   • ANGIOGRAM  FEB 2013   • ANGIOGRAM  Feb 2013    Done in [facility]   • [address] [facility] [place] [facility] chewable tab 4 tablet, 4 tablet, Oral, Q15 Min PRN **OR** dextrose 50 % injection 50 mL, 50 mL, Intravenous, Q15 Min PRN **OR** glucose (DEX4) oral liquid 30 g, 30 g, Oral, Q15 Min PRN **OR** Glucose-Vitamin C (DEX-4) chewable tab 8 tablet, 8 tablet, Oral, nondistended. Neurologic: No focal neurological deficits. Musculoskeletal: Full range of motion of all extremities. No edema   Integument: No lesions. No erythema. Psychiatric: Appropriate mood and affect.     Laboratory:  Lab Results   Component Value improving  4. HTN- controlled presently; continue hydralazine  5. Hypoxia- on chronic O2; VQ low prob; LE doppler negative  - HLIV  6. Dementia    Thank you for allowing me to participate in this patient's care.   Please feel free to call me with any questi

## 2021-03-20 NOTE — PROGRESS NOTES
NURSING ADMISSION NOTE      Patient admitted via Cart  Oriented to room. Safety precautions initiated. Bed in low position. Call light in reach. A&O times 2-3. Hx of dementia. Admitted with AMS/UTI. Started on rocephin in ED. IVF infusing.  Br

## 2021-03-20 NOTE — PROGRESS NOTES
[facility] Pharmacy Note:  Renal Dose Adjustment for Tramadol Amisha Dominguez    Shayne Mcduffie has been prescribed Tramadol (ULTRAM) 25-50 mg orally every 8 hours as needed for pain. Estimated Creatinine Clearance: 10.2 mL/min (A) (based on SCr of 3 mg/dL (H)).

## 2021-03-20 NOTE — PLAN OF CARE
Problem: Diabetes/Glucose Control  Goal: Glucose maintained within prescribed range  Description: INTERVENTIONS:  - Monitor Blood Glucose as ordered  - Assess for signs and symptoms of hyperglycemia and hypoglycemia  - Administer ordered medications to m appropriate  - Consider OT/PT consult to assist with strengthening/mobility  - Encourage toileting schedule  Outcome: Progressing     Problem: Patient/Family Goals  Goal: Patient/Family Short Term Goal  Description: Patient's Short Term Goal: Have UTI and sleeping. Rn did not notice any twitching but pt was awake. Pt's daughter is concerned about the twitching. Pt vitals were stable except pt diastolic bp was low.  Notified Ermelinda Fu of the above and no new orders received and MD will write parameters for h

## 2021-03-21 PROCEDURE — 99232 SBSQ HOSP IP/OBS MODERATE 35: CPT | Performed by: INTERNAL MEDICINE

## 2021-03-21 RX ORDER — POLYETHYLENE GLYCOL 3350 17 G/17G
17 POWDER, FOR SOLUTION ORAL DAILY PRN
Status: DISCONTINUED | OUTPATIENT
Start: 2021-03-21 | End: 2021-03-22

## 2021-03-21 NOTE — PLAN OF CARE
Problem: Diabetes/Glucose Control  Goal: Glucose maintained within prescribed range  Description: INTERVENTIONS:  - Monitor Blood Glucose as ordered  - Assess for signs and symptoms of hyperglycemia and hypoglycemia  - Administer ordered medications to m appropriate  - Consider OT/PT consult to assist with strengthening/mobility  - Encourage toileting schedule  Outcome: Progressing     Problem: Patient/Family Goals  Goal: Patient/Family Short Term Goal  Description: Patient's Short Term Goal: Have UTI and Fall precautions observed. Pt has been  Plan of care updated to the pt's daughter Syibl Escobar. Will continue to monitor.

## 2021-03-21 NOTE — PROGRESS NOTES
[facility] Hospitalist Progress Note     PCP: Charito Dobbins DO    Chief Complaint: follow-up    Overnight/Interim Events:      SUBJECTIVE:  Sitting in chair, eating lunch. No f/c. dtr reports more altered although pt answering questions for me. Had BM.  Didn' Recent Labs   Lab 03/19/21  1215   ALT 14   AST 19   ALB 3.0*       Recent Labs   Lab 03/20/21  1717 03/20/21  2122 03/21/21  0601 03/21/21  0815 03/21/21  1207   PGLU 89 247* 115* 87 161*       Recent Labs   Lab 03/19/21  1215   TROP <0.045 hopefully dc tmrw   -lives c dtr     Questions/concerns were discussed with patient and dtr by bedside. D/w RN [facility].      Sol Galicia MD  [facility] Hospitalist  [phone]  3/21/2021  3:01 PM

## 2021-03-21 NOTE — PROGRESS NOTES
[facility] Pharmacy Note:  Renal Adjustment     Shayne Mcduffie is a 80year old patient who has been prescribed cefazolin (ANCEF) 1 gm every 8 hrs. The estimated creatinine clearance is 13.2 mL/min (A) (based on SCr of 2.33 mg/dL (H)).  The dose has been adjus

## 2021-03-21 NOTE — PROGRESS NOTES
[facility]  Progress Note    Serafin Font Patient Status:  Inpatient    1925 MRN [id number]   [facility] 4NW-A Attending Sedrick Forde MD   [address] Day # 2 PCP Ariana Domínguez, DO     No acute events  Pt's daughter @ beds awake  HEENT: Moist mucous membranes. EOM-I. PERRL  Neck: No lymphadenopathy. No JVD. No carotid bruits. Respiratory: Clear to auscultation bilaterally. No wheezes. No rhonchi. Cardiovascular: S1, S2.  Regular rate and rhythm.   No murmurs  Abdomen: Sof

## 2021-03-21 NOTE — PLAN OF CARE
Pt alert and oriented x2. Increasingly confused throughout the night. VSS, afebrile. C/o generalized pain. Denies nausea or SOB. SpO2 within normal limits on room air while awake and nasal canula while sleeping.  Ambulatory to the bathroom with assist, impu limitations  - Instruct pt to call for assistance with activity based on assessment  - Modify environment to reduce risk of injury  - Provide assistive devices as appropriate  - Consider OT/PT consult to assist with strengthening/mobility  - Encourage toil

## 2021-03-21 NOTE — OCCUPATIONAL THERAPY NOTE
OCCUPATIONAL THERAPY EVALUATION - INPATIENT     Room Number: 423/423-A  Evaluation Date: 3/21/2021  Type of Evaluation: Initial  Presenting Problem:  (UTI, increased confusion/weakness)    Physician Order: IP Consult to Occupational Therapy  Reason for The and unspecified hyperlipidemia    • Pain in limb    • Personal history of urinary (tract) infection     below th eknee   • Renal disorder     elevated BUN and creatinine   • Shortness of breath     uses oxygen 2.5 L/NC every night for hx of low saturation lift to second level, adjustable bed, cane, walker)       Hand Dominance: Right  Drives: No    Prior Level of Function: Pt and pt's daughter provided occupational profile history and PLOF.  Pt's daughter reports that pt was independent PTA up until one ralph STRENGTH ASSESSMENT  Upper extremity ROM is within functional limits     Upper extremity strength is within functional limits     COORDINATION  Gross Motor    see PT note   Fine Motor    WFL - pt able to manipulate toilet tissue, grasp RW     ADDITIONAL TE to perform sit<>stand with RW given CGA, forward flexed posture noted. However, pt's daughter reports this is her baseline.  Pt able to perform functional mobility short household distances into hospital bathroom environment, mod assist to doff brief in sta participation. The patient is functioning below her previous functional level and would benefit from skilled inpatient OT to address the above deficits, maximizing patient’s ability to return safely to her prior level of function.     Patient Complexity

## 2021-03-22 VITALS
DIASTOLIC BLOOD PRESSURE: 51 MMHG | OXYGEN SATURATION: 94 % | HEART RATE: 61 BPM | HEIGHT: 56 IN | WEIGHT: 130 LBS | SYSTOLIC BLOOD PRESSURE: 134 MMHG | RESPIRATION RATE: 18 BRPM | TEMPERATURE: 98 F | BODY MASS INDEX: 29.25 KG/M2

## 2021-03-22 PROCEDURE — 99232 SBSQ HOSP IP/OBS MODERATE 35: CPT | Performed by: INTERNAL MEDICINE

## 2021-03-22 RX ORDER — CEPHALEXIN 250 MG/1
250 CAPSULE ORAL 4 TIMES DAILY
Qty: 16 CAPSULE | Refills: 0 | Status: SHIPPED | OUTPATIENT
Start: 2021-03-22 | End: 2021-03-22

## 2021-03-22 RX ORDER — MAGNESIUM OXIDE 400 MG (241.3 MG MAGNESIUM) TABLET
400 TABLET ONCE
Status: COMPLETED | OUTPATIENT
Start: 2021-03-22 | End: 2021-03-22

## 2021-03-22 RX ORDER — CEPHALEXIN 500 MG/1
500 CAPSULE ORAL 2 TIMES DAILY
Qty: 8 CAPSULE | Refills: 0 | Status: SHIPPED | OUTPATIENT
Start: 2021-03-22 | End: 2021-03-26

## 2021-03-22 NOTE — CM/SW NOTE
03/22/21 1300   CM/SW Referral Data   Referral Source Social Work (self-referral)   Reason for Referral Discharge planning   Informant Patient   Patient Info   Patient's Mental Status Alert;Oriented   Patient's Home Environment House   Patient lives wit

## 2021-03-22 NOTE — PLAN OF CARE
Patient alert and oriented x2. Confused, forgetful but can follow simple commands. Vital signs stable. Afebrile. On 2L O2 at HS with O2 sats 94-96%. No chest pain. No nausea and vomiting. Blood glucose monitored, Insulin given per MAR.  Fall precautions i

## 2021-03-22 NOTE — PHYSICAL THERAPY NOTE
PHYSICAL THERAPY TREATMENT NOTE - INPATIENT    Room Number: 423/423-A     Session: 1   Number of Visits to Meet Established Goals: 3    Presenting Problem: UTI, dehydration    Problem List  Principal Problem:    Urinary tract infection without hematuria, Performed by Miri Shafer MD at [address] [facility] ENDOSCOPY   • COLONOSCOPY & POLYPECTOMY  11/15/16= Polyp (serrated)    Repeat PRN   • GASTROSCOPY N/A 10/7/2013    Performed by Miri Shafer MD at [facility] ENDOSCOPY   • HYSTERECTOMY     • [address] EP (including a wheelchair)?: A Little   -   Need to walk in hospital room?: A Little   -   Climbing 3-5 steps with a railing?: Total       AM-PAC Score:  Raw Score: 16   Approx Degree of Impairment: 54.16%   Standardized Score (AM-PAC Scale): 40.78   CMS Mod care/supervision;Home with home health PT     PLAN  PT Treatment Plan: Bed mobility; Endurance; Energy conservation;Patient education;Gait training;Range of motion;Strengthening;Stoop training;Transfer training;Balance training; Body mechanics  [facility] [employer]

## 2021-03-22 NOTE — DISCHARGE SUMMARY
[facility] Internal Medicine Discharge Summary   Patient ID:  Cristina Peres  [id number]  40 year old  2/28/1925    Admit date: 3/19/2021    Discharge date and time: 3/22/2021     Attending Physician: Wicho Lewis MD     Primary Care Physician: Morales Jackson due to ckd and chronic dz, hgb 7.5 to 7.6; was 8.7 11/20/20  no overt bleeding noted   -dtr reports chronic issue as well; can f/u c PCP, told to notify if any sxs of LH/dizziness/hypotension    #Chronic Thrombocytopenia- trend  #Dementia, sundowning in PM gabapentin 100 MG Caps  Commonly known as: NEURONTIN     glimepiride 1 MG Tabs  Commonly known as: AMARYL     hydrALAzine HCl 25 MG Tabs  Commonly known as: APRESOLINE  Take 1 tablet (25 mg total) by mouth every 8 (eight) hours.      losartan Potassium 50 M STATED HISTORY: (As transcribed by Technologist)     FINDINGS:  VENTRICLES/SULCI:  Ventricles and sulci are prominent compatible with atrophy.  INTRACRANIAL:  Multifocal decreased attenuation in the periventricular white matter is consistent with chronic sm VIEW), RIGHT (CPT=73600)    Result Date: 2/24/2021  X-ray obtained of the right ankle and foot is of good quality. There is no evidence of acute fracture or dislocation. Joints are otherwise intact congruent. Some subtle spurring around the rear foot. 11:55 AM          Operative Procedures:      Code Status: Full Code    Total Time Coordinating Care: Greater than 30 minutes    Patient had opportunity to ask questions and state understand and agree with therapeutic plan as outlined.  I reviewed and reconc

## 2021-03-22 NOTE — PROGRESS NOTES
NURSING DISCHARGE NOTE    Discharged Home via Wheelchair. Accompanied by Family member  Belongings Taken by patient/family. Pt discharged home with daughter. AVS reviewed with pt and daughter. Verbalized understanding.

## 2021-03-22 NOTE — PROGRESS NOTES
[facility]  Progress Note    Shayne Mcduffie Patient Status:  Inpatient    1925 MRN [id number]   [facility] 4NW-A Attending Zo Wu MD   [facility] Day # 3 PCP Soledad Jacobson, DO     No acute events  Eating breakfast  Co distress. Alert and awake  HEENT: Moist mucous membranes. EOM-I. PERRL  Neck: No lymphadenopathy. No JVD. No carotid bruits. Respiratory: Clear to auscultation bilaterally. No wheezes. No rhonchi. Cardiovascular: S1, S2.  Regular rate and rhythm.   No m

## 2021-04-07 PROBLEM — M17.0 OSTEOARTHRITIS OF BOTH KNEES, UNSPECIFIED OSTEOARTHRITIS TYPE: Status: ACTIVE | Noted: 2021-04-07

## 2021-04-07 PROBLEM — M25.561 ACUTE PAIN OF BOTH KNEES: Status: ACTIVE | Noted: 2021-04-07

## 2021-04-07 PROBLEM — M25.562 ACUTE PAIN OF BOTH KNEES: Status: ACTIVE | Noted: 2021-04-07

## 2021-04-22 ENCOUNTER — OFFICE VISIT (OUTPATIENT)
Dept: NEPHROLOGY | Facility: CLINIC | Age: 86
End: 2021-04-22
Payer: MEDICARE

## 2021-04-22 VITALS — DIASTOLIC BLOOD PRESSURE: 62 MMHG | SYSTOLIC BLOOD PRESSURE: 138 MMHG | BODY MASS INDEX: 28 KG/M2 | WEIGHT: 127 LBS

## 2021-04-22 DIAGNOSIS — N18.30 STAGE 3 CHRONIC KIDNEY DISEASE, UNSPECIFIED WHETHER STAGE 3A OR 3B CKD (HCC): Primary | ICD-10-CM

## 2021-04-22 DIAGNOSIS — I10 ESSENTIAL HYPERTENSION: ICD-10-CM

## 2021-04-22 PROCEDURE — 99214 OFFICE O/P EST MOD 30 MIN: CPT | Performed by: INTERNAL MEDICINE

## 2021-04-22 RX ORDER — HYDRALAZINE HYDROCHLORIDE 50 MG/1
50 TABLET, FILM COATED ORAL 3 TIMES DAILY
COMMUNITY

## 2021-04-22 RX ORDER — TORSEMIDE 20 MG/1
20 TABLET ORAL AS NEEDED
COMMUNITY

## 2021-04-22 NOTE — PROGRESS NOTES
Nephrology Progress Note      ASSESSMENT/PLAN:       1) CKD 3/4- baseline Cr approx 2 mg/dl over last several years represents hypertensive and age-related nephrosclerosis and possibly mild diabetic nephropathy which fluctuates depending on volume status a • HIGH CHOLESTEROL    • Kidney disease 2006   • Lumbar degenerative disc disease    • Lumbar radiculitis    • Lumbar spondylosis    • Osteoarthrosis, unspecified whether generalized or localized, unspecified site    • Other and unspecified hyperlipidemia LUMBAR EPIDURAL;  Surgeon: Radha Esquivel MD;  Location: [facility]   • HYSTERECTOMY     • HYSTERECTOMY  1974   • INJECTION, W/WO CONTRAST, DX/THERAPEUTIC SUBSTANCE, EPIDURAL/SUBARACHNOID; LUMBAR/SACRAL  8/30/2013    Procedure: LUMBAR DOCUMENTED NOT TO HAVE EXPERIENCED ANY OF THE FOLLOWING EVENTS  8/30/2013    Procedure: LUMBAR EPIDURAL;  Surgeon: Leonardo Brantley MD;  Location: [address] [place] [address]   • PATIENT DOCUMENTED NOT TO HAVE EXPERIENCED ANY OF THE FOLLOWING EVENTS  9/1 MANAGEMENT   • UPPER GI ENDOSCOPY PERFORMED  10/7/13      Family History   Problem Relation Age of Onset   • Cancer Father         Esophagus (1964)   • Diabetes Mother    • Hypertension Sister    • Obesity Sister    • Other (gastric cancer) Sister    • Oth palpitations  Denies SOB/cough/hemoptysis  Denies abd or flank pain  Denies N/V/D  Denies change in urinary habits or gross hematuria  Denies LE edema  Denies skin rashes/myalgias/arthralgias      PHYSICAL EXAM:   /62   Wt 127 lb (57.6 kg)   LMP  (LM

## 2021-05-25 VITALS
HEIGHT: 56 IN | BODY MASS INDEX: 31.27 KG/M2 | WEIGHT: 139 LBS | SYSTOLIC BLOOD PRESSURE: 136 MMHG | HEART RATE: 60 BPM | DIASTOLIC BLOOD PRESSURE: 52 MMHG

## 2021-06-21 ENCOUNTER — APPOINTMENT (OUTPATIENT)
Dept: CARDIOLOGY | Age: 86
End: 2021-06-21

## 2021-06-28 ENCOUNTER — APPOINTMENT (OUTPATIENT)
Dept: CARDIOLOGY | Age: 86
End: 2021-06-28

## 2021-11-08 NOTE — PHYSICAL THERAPY NOTE
Called pt no answer had to LVM to call the office PHYSICAL THERAPY EVALUATION - INPATIENT     Room Number: 3465/9233-F  Evaluation Date: 6/2/2019  Type of Evaluation: Initial  Physician Order: PT Eval and Treat    Presenting Problem: UTI  Reason for Therapy: Mobility Dysfunction and Discharge Planni low saturation identified during sleep study 2014   • Spinal stenosis    • Type II or unspecified type diabetes mellitus without mention of complication, not stated as uncontrolled    • Unspecified acute reaction to stress    • Unspecified essential hypert walking    Patient self-stated goal is home    OBJECTIVE     Fall Risk: High fall risk    WEIGHT BEARING RESTRICTION  Weight Bearing Restriction: None                PAIN ASSESSMENT  Rating: Unable to rate  Location: B knees       COGNITION  · Overall Cogn cueing on approp hand placement and posturing. Gt with use of RW with slow steady gt min/'. No SOB or dizziness. Left on the recliner and encourage to be up at tolerance and instructed to walk with staff several times while in hosp.  Discussed with carl above deficits to assist patient in returning to prior to level of function. DISCHARGE RECOMMENDATIONS  PT Discharge Recommendations: 24 hour care/supervision;Home with home health PT    PLAN  PT Treatment Plan: Bed mobility; Body mechanics; Endurance; Energ

## 2022-03-29 PROBLEM — S39.012D LUMBAR STRAIN, SUBSEQUENT ENCOUNTER: Status: ACTIVE | Noted: 2022-03-29

## 2022-04-08 PROBLEM — G47.33 OSA (OBSTRUCTIVE SLEEP APNEA): Status: ACTIVE | Noted: 2022-04-08

## 2022-07-05 ENCOUNTER — OFFICE VISIT (OUTPATIENT)
Dept: NEPHROLOGY | Facility: CLINIC | Age: 87
End: 2022-07-05
Payer: MEDICARE

## 2022-07-05 VITALS — DIASTOLIC BLOOD PRESSURE: 48 MMHG | SYSTOLIC BLOOD PRESSURE: 110 MMHG | WEIGHT: 135.13 LBS | BODY MASS INDEX: 30 KG/M2

## 2022-07-05 DIAGNOSIS — N18.4 CKD (CHRONIC KIDNEY DISEASE) STAGE 4, GFR 15-29 ML/MIN (HCC): Primary | ICD-10-CM

## 2022-07-05 DIAGNOSIS — I10 PRIMARY HYPERTENSION: ICD-10-CM

## 2022-07-05 PROCEDURE — 99214 OFFICE O/P EST MOD 30 MIN: CPT | Performed by: INTERNAL MEDICINE

## 2023-03-29 ENCOUNTER — OFFICE VISIT (OUTPATIENT)
Dept: WOUND CARE | Facility: HOSPITAL | Age: 88
End: 2023-03-29
Attending: NURSE PRACTITIONER
Payer: MEDICARE

## 2023-03-29 VITALS
OXYGEN SATURATION: 98 % | TEMPERATURE: 98 F | HEIGHT: 60 IN | SYSTOLIC BLOOD PRESSURE: 152 MMHG | HEART RATE: 71 BPM | DIASTOLIC BLOOD PRESSURE: 82 MMHG | WEIGHT: 135 LBS | RESPIRATION RATE: 18 BRPM | BODY MASS INDEX: 26.5 KG/M2

## 2023-03-29 DIAGNOSIS — L89.612 PRESSURE INJURY OF RIGHT HEEL, STAGE 2 (HCC): Primary | ICD-10-CM

## 2023-03-29 PROCEDURE — 99215 OFFICE O/P EST HI 40 MIN: CPT | Performed by: NURSE PRACTITIONER

## 2023-04-12 ENCOUNTER — OFFICE VISIT (OUTPATIENT)
Dept: WOUND CARE | Facility: HOSPITAL | Age: 88
End: 2023-04-12
Attending: NURSE PRACTITIONER
Payer: MEDICARE

## 2023-04-12 VITALS
DIASTOLIC BLOOD PRESSURE: 56 MMHG | SYSTOLIC BLOOD PRESSURE: 145 MMHG | TEMPERATURE: 98 F | HEART RATE: 71 BPM | OXYGEN SATURATION: 93 %

## 2023-04-12 DIAGNOSIS — L89.612 PRESSURE INJURY OF RIGHT HEEL, STAGE 2 (HCC): ICD-10-CM

## 2023-04-12 PROCEDURE — 99213 OFFICE O/P EST LOW 20 MIN: CPT | Performed by: NURSE PRACTITIONER

## 2023-04-26 ENCOUNTER — OFFICE VISIT (OUTPATIENT)
Dept: WOUND CARE | Facility: HOSPITAL | Age: 88
End: 2023-04-26
Attending: NURSE PRACTITIONER
Payer: MEDICARE

## 2023-04-26 VITALS
SYSTOLIC BLOOD PRESSURE: 129 MMHG | DIASTOLIC BLOOD PRESSURE: 73 MMHG | OXYGEN SATURATION: 92 % | TEMPERATURE: 97 F | HEART RATE: 66 BPM | RESPIRATION RATE: 20 BRPM

## 2023-04-26 DIAGNOSIS — L89.612 PRESSURE INJURY OF RIGHT HEEL, STAGE 2 (HCC): ICD-10-CM

## 2023-05-15 ENCOUNTER — OFFICE VISIT (OUTPATIENT)
Dept: WOUND CARE | Facility: HOSPITAL | Age: 88
End: 2023-05-15
Attending: NURSE PRACTITIONER
Payer: MEDICARE

## 2023-05-15 VITALS
RESPIRATION RATE: 18 BRPM | OXYGEN SATURATION: 90 % | HEART RATE: 68 BPM | DIASTOLIC BLOOD PRESSURE: 50 MMHG | TEMPERATURE: 97 F | SYSTOLIC BLOOD PRESSURE: 124 MMHG

## 2023-05-15 DIAGNOSIS — L89.612 PRESSURE INJURY OF RIGHT HEEL, STAGE 2 (HCC): Primary | ICD-10-CM

## 2023-05-15 PROCEDURE — 99213 OFFICE O/P EST LOW 20 MIN: CPT

## 2025-03-26 ENCOUNTER — TELEPHONE (OUTPATIENT)
Dept: WOUND CARE | Facility: HOSPITAL | Age: OVER 89
End: 2025-03-26

## 2025-03-26 NOTE — TELEPHONE ENCOUNTER
Talked to Rubi, she stated that mom is having a lot of pain on her heel, but does not have an opened wound. She said, Dr Sanchez PCP referred her mom to wound care. Adv that we only see pts with opened wound.

## 2025-04-03 ENCOUNTER — OFFICE VISIT (OUTPATIENT)
Dept: PODIATRY CLINIC | Facility: CLINIC | Age: OVER 89
End: 2025-04-03
Payer: MEDICARE

## 2025-04-03 DIAGNOSIS — L89.610 PRESSURE INJURY OF RIGHT HEEL, UNSTAGEABLE (HCC): ICD-10-CM

## 2025-04-03 DIAGNOSIS — I73.89 OTHER SPECIFIED PERIPHERAL VASCULAR DISEASES: Primary | ICD-10-CM

## 2025-04-03 PROCEDURE — 99203 OFFICE O/P NEW LOW 30 MIN: CPT | Performed by: PODIATRIST

## 2025-04-03 NOTE — PROGRESS NOTES
Ann Herndon is a 89 year old female.   Chief Complaint   Patient presents with    Heel Pain     Right heel -Pain onset for about 3 months. She used to have a open wound about 2 years. Pain when walking. Pt is diabetic. Pt here with her daughter. No numbness or tinlging.          HPI:   Very pleasant 89-year-old patient presents to the clinic with her daughter who is helping to interpret for her.  She has developed an irritation on her right heel there has been no open wound or drainage.  But she complains of constant pain.  Going on for about 3 months but she does have a history of a previous open wound of about 2 years she gets pain when walking she is a known diabetic.  At today's visit reviewed nurse's history as taken above, allergies medications and medical history as documented below.  All changes duly noted  Allergies: Bee venom   Current Outpatient Medications   Medication Sig Dispense Refill    atorvastatin 10 MG Oral Tab       diclofenac 1 % External Gel       gabapentin 100 MG Oral Cap       glimepiride 1 MG Oral Tab       hydrALAZINE 50 MG Oral Tab       losartan 50 MG Oral Tab       mupirocin 2 % External Ointment       sertraline 100 MG Oral Tab       torsemide 20 MG Oral Tab       gabapentin 100 MG Oral Cap Take 1 capsule (100 mg total) by mouth 3 (three) times daily. 270 capsule 1    LOSARTAN 50 MG Oral Tab TAKE 1 TABLET(50 MG) BY MOUTH DAILY 90 tablet 1    GLIMEPIRIDE 1 MG Oral Tab TAKE 1 TABLET(1 MG) BY MOUTH DAILY WITH BREAKFAST 90 tablet 1    hydrALAZINE 50 MG Oral Tab Take 1 tablet (50 mg total) by mouth 3 (three) times daily. 270 tablet 2    SERTRALINE 100 MG Oral Tab TAKE 1 TABLET(100 MG) BY MOUTH DAILY 90 tablet 2    torsemide 20 MG Oral Tab Take 1 tablet (20 mg total) by mouth as needed.      Multiple Vitamins-Minerals (TAB-A-OMI) Oral Tab Take 1 tablet by mouth daily.      Cholecalciferol (VITAMIN D3) 25 MCG (1000 UT) Oral Cap Take 1 tablet by mouth daily.      Multiple  Vitamins-Minerals (PRESERVISION AREDS) Oral Tab Take 1 tablet by mouth 2 (two) times a day.      ferrous sulfate 325 (65 FE) MG Oral Tab EC Take 1 tablet (325 mg total) by mouth daily with breakfast.        Past Medical History:    Anemia    Arthritis    Back problem    herniated discs L4-L5    Blood disorder    anemia    Cellulitis, leg    Dementia (HCC)    Depression    Elevated blood pressure reading without diagnosis of hypertension    Esophageal reflux    Gastritis    GENITO-URINARY DISEASE    Glaucoma    HIGH BLOOD PRESSURE    HIGH CHOLESTEROL    Kidney disease    Lumbar degenerative disc disease    Lumbar radiculitis    Lumbar spondylosis    MADHU (obstructive sleep apnea)    AHI 5 RDI 5 REM AHI 0 Supine AHI 2 non-supine AHI 6     MADHU (obstructive sleep apnea)    Osteoarthrosis, unspecified whether generalized or localized, unspecified site    Other and unspecified hyperlipidemia    Pain in limb    Personal history of urinary (tract) infection    below th eknee    Renal disorder    elevated BUN and creatinine    Shortness of breath    uses oxygen 2.5 L/NC every night for hx of low saturation identified during sleep study 2014    Spinal stenosis    Type II or unspecified type diabetes mellitus without mention of complication, not stated as uncontrolled    Unspecified acute reaction to stress    Unspecified essential hypertension    Visual impairment    glasses      Past Surgical History:   Procedure Laterality Date    Angiogram  FEB 2013    Angiogram  Feb 2013    Done in [facility]    Appendectomy  1949    Appendectomy      Cataract surgery, complex  08/2010    Cath percutaneous  transluminal coronary angioplasty  2/2012    Normal in [facility]    Colonoscopy N/A 11/15/2016    Procedure: COLONOSCOPY;  Surgeon: Levar Callaway MD;  Location:  ENDOSCOPY    Colonoscopy & polypectomy  11/15/16= Polyp (serrated)    Repeat PRN    Fluor gid & loclzj ndl/cath spi dx/ther njx  8/30/2013    Procedure: LUMBAR EPIDURAL;  Surgeon:  Srini Benjamin MD;  Location: [facility]    Fluor gid & loclzj ndl/cath spi dx/ther njx  9/16/2013    Procedure: LUMBAR EPIDURAL;  Surgeon: Srini Benjamin MD;  Location: [facility]    Fluor gid & loclzj ndl/cath spi dx/ther njx  10/8/2013    Procedure: LUMBAR EPIDURAL;  Surgeon: Srini Benjamin MD;  Location: [facility]    Fluor gid & loclzj ndl/cath spi dx/ther njx  6/24/2014    Procedure: ;  Surgeon: Srini Benjamin MD;  Location: [facility]    Fluor gid & loclzj ndl/cath spi dx/ther njx N/A 7/24/2014    Procedure: LUMBAR EPIDURAL;  Surgeon: Srini Benjamin MD;  Location: [facility]    Hysterectomy      Hysterectomy  1974    Injection, w/wo contrast, dx/therapeutic substance, epidural/subarachnoid; lumbar/sacral  8/30/2013    Procedure: LUMBAR EPIDURAL;  Surgeon: Srini Benjamin MD;  Location: [facility]    Injection, w/wo contrast, dx/therapeutic substance, epidural/subarachnoid; lumbar/sacral  9/16/2013    Procedure: LUMBAR EPIDURAL;  Surgeon: Srini Benjamin MD;  Location: [facility]    Injection, w/wo contrast, dx/therapeutic substance, epidural/subarachnoid; lumbar/sacral  10/8/2013    Procedure: LUMBAR EPIDURAL;  Surgeon: Srini Benjamin MD;  Location: [facility]    Injection, w/wo contrast, dx/therapeutic substance, epidural/subarachnoid; lumbar/sacral N/A 6/24/2014    Procedure: LUMBAR EPIDURAL;  Surgeon: Srini Benjamin MD;  Location: [facility]    Injection, w/wo contrast, dx/therapeutic substance, epidural/subarachnoid; lumbar/sacral N/A 7/24/2014    Procedure: LUMBAR EPIDURAL;  Surgeon: Srini Benjamin MD;  Location: [facility]    M-sedaj by  phys perfrmg svc 5+ yr  8/30/2013    Procedure: LUMBAR EPIDURAL;  Surgeon: Srini Benjamin MD;  Location: [facility]    M-sedaj by  phys  perfrmg svc 5+ yr  9/16/2013    Procedure: LUMBAR EPIDURAL;  Surgeon: Srini Benjamin MD;  Location: [facility]    M-sedaj by  phys perfrmg svc 5+ yr  10/8/2013    Procedure: LUMBAR EPIDURAL;  Surgeon: Srini Benjamin MD;  Location: [facility]    M-sedaj by  phys perfrmg svc 5+ yr  6/24/2014    Procedure: ;  Surgeon: Srini Benjamin MD;  Location: [facility]    M-sedaj by  phys perfrmg svc 5+ yr N/A 7/24/2014    Procedure: LUMBAR EPIDURAL;  Surgeon: Srini Benjamin MD;  Location: [facility]    Patient documented not to have experienced any of the following events  8/30/2013    Procedure: LUMBAR EPIDURAL;  Surgeon: Srini Benjamin MD;  Location: [facility]    Patient documented not to have experienced any of the following events  9/16/2013    Procedure: LUMBAR EPIDURAL;  Surgeon: Srini Benjamin MD;  Location: [facility]    Patient documented not to have experienced any of the following events  10/8/2013    Procedure: LUMBAR EPIDURAL;  Surgeon: Srini Benjamin MD;  Location: [facility]    Patient documented not to have experienced any of the following events  6/24/2014    Procedure: ;  Surgeon: Srini Benjamin MD;  Location: [facility]    Patient documented not to have experienced any of the following events N/A 7/24/2014    Procedure: LUMBAR EPIDURAL;  Surgeon: Srini Benjamin MD;  Location: [facility]    Patient withough preoperative order for iv antibiotic surgical site infection prophylaxis.  8/30/2013    Procedure: LUMBAR EPIDURAL;  Surgeon: Srini Benjamin MD;  Location: [facility]    Patient withough preoperative order for iv antibiotic surgical site infection prophylaxis.  9/16/2013    Procedure: LUMBAR EPIDURAL;  Surgeon: Srini Benjamin MD;  Location: [facility]    Patient withough  preoperative order for iv antibiotic surgical site infection prophylaxis.  10/8/2013    Procedure: LUMBAR EPIDURAL;  Surgeon: Srini Benjamin MD;  Location: [facility]    Patient withough preoperative order for iv antibiotic surgical site infection prophylaxis.  6/24/2014    Procedure: ;  Surgeon: Srini Benjamin MD;  Location: [facility]    Patient withough preoperative order for iv antibiotic surgical site infection prophylaxis. N/A 7/24/2014    Procedure: LUMBAR EPIDURAL;  Surgeon: Srini Benjamin MD;  Location: [facility]    Upper gi endoscopy performed  10/7/13      Family History   Problem Relation Age of Onset    Cancer Father         Esophagus (1964)    Diabetes Mother     Hypertension Sister     Obesity Sister     Other (gastric cancer) Sister     Other (malignant pancreatic neoplasm) Sister     Cancer Sister         Colon (2009)    Cancer Sister         Pancreas (2001)      Social History     Socioeconomic History    Marital status:    Tobacco Use    Smoking status: Never    Smokeless tobacco: Never   Vaping Use    Vaping status: Never Used   Substance and Sexual Activity    Alcohol use: Yes     Comment: rarely    Drug use: No   Other Topics Concern    Caffeine Concern Yes     Comment: decaf coffee but 2 cups regular tea    Exercise No    Seat Belt Yes           REVIEW OF SYSTEMS:   Today reviewed systens as documented below  GENERAL HEALTH: feels well otherwise  SKIN: Refer to exam below  RESPIRATORY: denies shortness of breath with exertion  CARDIOVASCULAR: denies chest pain on exertion  GI: denies abdominal pain and denies heartburn  NEURO: denies headaches    EXAM:   LMP  (LMP Unknown)   GENERAL: well developed, well nourished, in no apparent distress  EXTREMITIES:   1. Integument: Skin on her feet is warm and dry.  All of the digits are somewhat cool but not cyanotic the toenails 1-5 have mild to moderate thickening with yellow  dystrophic changes subungual debris distal lysis from the nailbed the right heel which is painful seems to have a slight discoloration there is no open draining wound but this appears to be a deep tissue injury or pressure injury currently unstageable.  No surrounding signs of infection noted.   2. Vascular: Patient has very weakly palpable dorsalis pedis pulse diminished capillary turn to the pedal digits pedal hair growth is absent feet is cool to the touch I cannot palpate a posterior tibial   3. Neurologic: Patient has pain sensation intact her right heel is painful on palpation posteriorly   4. Musculoskeletal: The patient is ambulatory but does use assistive device.    ASSESSMENT AND PLAN:   Diagnoses and all orders for this visit:    Other specified peripheral vascular diseases    Pressure injury of right heel, unstageable ([facility])        Plan: At today's office visit I told him this would probably get better with just plain offloading I reviewed heel protecting boots which are larger boots to fit around her heel and keep pressure off of them they can use so for questions but they have to use something that does not compress they should not use bed pillows because they will compress and that he will still wind up on the bed and cause pressure I explained to them that she needs a floating heel and I explained to them in detail what that was follow-up in a couple of weeks but sooner if required if she does develop a sore they should go to the wound care center at [facility].    The patient indicates understanding of these issues and agrees to the plan.    Paul Denton DPM

## 2025-04-13 ENCOUNTER — APPOINTMENT (OUTPATIENT)
Dept: GENERAL RADIOLOGY | Facility: HOSPITAL | Age: OVER 89
DRG: 682 | End: 2025-04-13
Attending: EMERGENCY MEDICINE
Payer: MEDICARE

## 2025-04-13 ENCOUNTER — HOSPITAL ENCOUNTER (INPATIENT)
Facility: HOSPITAL | Age: OVER 89
LOS: 10 days | Discharge: NURSING, REHAB OR LONG-TERM CARE | End: 2025-04-23
Attending: EMERGENCY MEDICINE | Admitting: INTERNAL MEDICINE
Payer: MEDICARE

## 2025-04-13 ENCOUNTER — APPOINTMENT (OUTPATIENT)
Dept: CT IMAGING | Facility: HOSPITAL | Age: OVER 89
End: 2025-04-13
Attending: EMERGENCY MEDICINE
Payer: MEDICARE

## 2025-04-13 ENCOUNTER — APPOINTMENT (OUTPATIENT)
Dept: CT IMAGING | Facility: HOSPITAL | Age: OVER 89
DRG: 682 | End: 2025-04-13
Attending: EMERGENCY MEDICINE
Payer: MEDICARE

## 2025-04-13 ENCOUNTER — APPOINTMENT (OUTPATIENT)
Dept: GENERAL RADIOLOGY | Facility: HOSPITAL | Age: OVER 89
End: 2025-04-13
Attending: EMERGENCY MEDICINE
Payer: MEDICARE

## 2025-04-13 ENCOUNTER — HOSPITAL ENCOUNTER (INPATIENT)
Facility: HOSPITAL | Age: OVER 89
LOS: 10 days | Discharge: NURSING, REHAB OR LONG-TERM CARE | DRG: 682 | End: 2025-04-23
Attending: EMERGENCY MEDICINE | Admitting: INTERNAL MEDICINE
Payer: MEDICARE

## 2025-04-13 DIAGNOSIS — N39.0 URINARY TRACT INFECTION WITH HEMATURIA, SITE UNSPECIFIED: ICD-10-CM

## 2025-04-13 DIAGNOSIS — R31.9 URINARY TRACT INFECTION WITH HEMATURIA, SITE UNSPECIFIED: ICD-10-CM

## 2025-04-13 DIAGNOSIS — I21.A1 TYPE 2 MI (MYOCARDIAL INFARCTION) (HCC): ICD-10-CM

## 2025-04-13 DIAGNOSIS — E86.0 DEHYDRATION, SEVERE: ICD-10-CM

## 2025-04-13 DIAGNOSIS — E87.0 HYPERNATREMIA: Primary | ICD-10-CM

## 2025-04-13 DIAGNOSIS — N18.9 ACUTE ON CHRONIC RENAL INSUFFICIENCY: ICD-10-CM

## 2025-04-13 DIAGNOSIS — N28.9 ACUTE ON CHRONIC RENAL INSUFFICIENCY: ICD-10-CM

## 2025-04-13 LAB
ALBUMIN SERPL-MCNC: 4.1 G/DL (ref 3.2–4.8)
ALBUMIN/GLOB SERPL: 1.3 {RATIO} (ref 1–2)
ALP LIVER SERPL-CCNC: 76 U/L (ref 55–142)
ALT SERPL-CCNC: 25 U/L (ref 10–49)
ANION GAP SERPL CALC-SCNC: 14 MMOL/L (ref 0–18)
AST SERPL-CCNC: 47 U/L (ref ?–34)
BASOPHILS # BLD AUTO: 0.03 X10(3) UL (ref 0–0.2)
BASOPHILS NFR BLD AUTO: 0.3 %
BILIRUB SERPL-MCNC: 0.4 MG/DL (ref 0.2–0.9)
BILIRUB UR QL STRIP.AUTO: NEGATIVE
BUN BLD-MCNC: 126 MG/DL (ref 9–23)
CALCIUM BLD-MCNC: 10.7 MG/DL (ref 8.7–10.6)
CHLORIDE SERPL-SCNC: 122 MMOL/L (ref 98–112)
CHOLEST SERPL-MCNC: 200 MG/DL (ref ?–200)
CO2 SERPL-SCNC: 22 MMOL/L (ref 21–32)
COLOR UR AUTO: YELLOW
CREAT BLD-MCNC: 5.32 MG/DL (ref 0.55–1.02)
EGFRCR SERPLBLD CKD-EPI 2021: 7 ML/MIN/1.73M2 (ref 60–?)
EOSINOPHIL # BLD AUTO: 0.01 X10(3) UL (ref 0–0.7)
EOSINOPHIL NFR BLD AUTO: 0.1 %
ERYTHROCYTE [DISTWIDTH] IN BLOOD BY AUTOMATED COUNT: 13.3 %
GLOBULIN PLAS-MCNC: 3.2 G/DL (ref 2–3.5)
GLUCOSE BLD-MCNC: 221 MG/DL (ref 70–99)
GLUCOSE BLD-MCNC: 252 MG/DL (ref 70–99)
GLUCOSE BLD-MCNC: 277 MG/DL (ref 70–99)
GLUCOSE UR STRIP.AUTO-MCNC: NORMAL MG/DL
HCT VFR BLD AUTO: 34.8 % (ref 35–48)
HDLC SERPL-MCNC: 52 MG/DL (ref 40–59)
HGB BLD-MCNC: 10.3 G/DL (ref 12–16)
IMM GRANULOCYTES # BLD AUTO: 0.04 X10(3) UL (ref 0–1)
IMM GRANULOCYTES NFR BLD: 0.4 %
KETONES UR STRIP.AUTO-MCNC: NEGATIVE MG/DL
LDLC SERPL CALC-MCNC: 113 MG/DL (ref ?–100)
LEUKOCYTE ESTERASE UR QL STRIP.AUTO: 500
LIPASE SERPL-CCNC: 82 U/L (ref 12–53)
LYMPHOCYTES # BLD AUTO: 2.25 X10(3) UL (ref 1–4)
LYMPHOCYTES NFR BLD AUTO: 20.2 %
MCH RBC QN AUTO: 28.4 PG (ref 26–34)
MCHC RBC AUTO-ENTMCNC: 29.6 G/DL (ref 31–37)
MCV RBC AUTO: 95.9 FL (ref 80–100)
MONOCYTES # BLD AUTO: 0.72 X10(3) UL (ref 0.1–1)
MONOCYTES NFR BLD AUTO: 6.5 %
NEUTROPHILS # BLD AUTO: 8.11 X10 (3) UL (ref 1.5–7.7)
NEUTROPHILS # BLD AUTO: 8.11 X10(3) UL (ref 1.5–7.7)
NEUTROPHILS NFR BLD AUTO: 72.5 %
NITRITE UR QL STRIP.AUTO: NEGATIVE
NONHDLC SERPL-MCNC: 148 MG/DL (ref ?–130)
OSMOLALITY SERPL CALC.SUM OF ELEC: 376 MOSM/KG (ref 275–295)
PH UR STRIP.AUTO: 5.5 [PH] (ref 5–8)
PLATELET # BLD AUTO: 194 10(3)UL (ref 150–450)
POTASSIUM SERPL-SCNC: 5.4 MMOL/L (ref 3.5–5.1)
PROT SERPL-MCNC: 7.3 G/DL (ref 5.7–8.2)
PROT UR STRIP.AUTO-MCNC: 70 MG/DL
RBC # BLD AUTO: 3.63 X10(6)UL (ref 3.8–5.3)
RBC #/AREA URNS AUTO: >10 /HPF
SODIUM SERPL-SCNC: 158 MMOL/L (ref 136–145)
SP GR UR STRIP.AUTO: 1.01 (ref 1–1.03)
TRIGL SERPL-MCNC: 199 MG/DL (ref 30–149)
TROPONIN I SERPL HS-MCNC: 158 NG/L (ref ?–34)
UROBILINOGEN UR STRIP.AUTO-MCNC: NORMAL MG/DL
VLDLC SERPL CALC-MCNC: 35 MG/DL (ref 0–30)
WBC # BLD AUTO: 11.2 X10(3) UL (ref 4–11)
WBC #/AREA URNS AUTO: >50 /HPF

## 2025-04-13 PROCEDURE — 71045 X-RAY EXAM CHEST 1 VIEW: CPT | Performed by: EMERGENCY MEDICINE

## 2025-04-13 PROCEDURE — 72125 CT NECK SPINE W/O DYE: CPT | Performed by: EMERGENCY MEDICINE

## 2025-04-13 PROCEDURE — 73030 X-RAY EXAM OF SHOULDER: CPT | Performed by: EMERGENCY MEDICINE

## 2025-04-13 PROCEDURE — 70450 CT HEAD/BRAIN W/O DYE: CPT | Performed by: EMERGENCY MEDICINE

## 2025-04-13 PROCEDURE — 73502 X-RAY EXAM HIP UNI 2-3 VIEWS: CPT | Performed by: EMERGENCY MEDICINE

## 2025-04-13 PROCEDURE — 74176 CT ABD & PELVIS W/O CONTRAST: CPT | Performed by: EMERGENCY MEDICINE

## 2025-04-13 RX ORDER — METOCLOPRAMIDE HYDROCHLORIDE 5 MG/ML
10 INJECTION INTRAMUSCULAR; INTRAVENOUS EVERY 8 HOURS PRN
Status: DISCONTINUED | OUTPATIENT
Start: 2025-04-13 | End: 2025-04-16

## 2025-04-13 RX ORDER — SENNOSIDES 8.6 MG
17.2 TABLET ORAL NIGHTLY PRN
Status: DISCONTINUED | OUTPATIENT
Start: 2025-04-13 | End: 2025-04-23

## 2025-04-13 RX ORDER — SODIUM CHLORIDE 9 MG/ML
125 INJECTION, SOLUTION INTRAVENOUS CONTINUOUS
Status: DISCONTINUED | OUTPATIENT
Start: 2025-04-13 | End: 2025-04-13

## 2025-04-13 RX ORDER — HEPARIN SODIUM 5000 [USP'U]/ML
5000 INJECTION, SOLUTION INTRAVENOUS; SUBCUTANEOUS EVERY 12 HOURS SCHEDULED
Status: DISCONTINUED | OUTPATIENT
Start: 2025-04-14 | End: 2025-04-23

## 2025-04-13 RX ORDER — NICOTINE POLACRILEX 4 MG
30 LOZENGE BUCCAL
Status: DISCONTINUED | OUTPATIENT
Start: 2025-04-13 | End: 2025-04-23

## 2025-04-13 RX ORDER — MORPHINE SULFATE 4 MG/ML
4 INJECTION, SOLUTION INTRAMUSCULAR; INTRAVENOUS ONCE
Status: DISCONTINUED | OUTPATIENT
Start: 2025-04-13 | End: 2025-04-13

## 2025-04-13 RX ORDER — ACETAMINOPHEN 500 MG
500 TABLET ORAL EVERY 4 HOURS PRN
Status: DISCONTINUED | OUTPATIENT
Start: 2025-04-13 | End: 2025-04-23

## 2025-04-13 RX ORDER — SODIUM CHLORIDE 450 MG/100ML
INJECTION, SOLUTION INTRAVENOUS CONTINUOUS
Status: DISCONTINUED | OUTPATIENT
Start: 2025-04-13 | End: 2025-04-16

## 2025-04-13 RX ORDER — BISACODYL 10 MG
10 SUPPOSITORY, RECTAL RECTAL
Status: DISCONTINUED | OUTPATIENT
Start: 2025-04-13 | End: 2025-04-23

## 2025-04-13 RX ORDER — POLYETHYLENE GLYCOL 3350 17 G/17G
17 POWDER, FOR SOLUTION ORAL DAILY PRN
Status: DISCONTINUED | OUTPATIENT
Start: 2025-04-13 | End: 2025-04-23

## 2025-04-13 RX ORDER — DEXTROSE MONOHYDRATE 25 G/50ML
50 INJECTION, SOLUTION INTRAVENOUS
Status: DISCONTINUED | OUTPATIENT
Start: 2025-04-13 | End: 2025-04-23

## 2025-04-13 RX ORDER — ONDANSETRON 2 MG/ML
4 INJECTION INTRAMUSCULAR; INTRAVENOUS EVERY 6 HOURS PRN
Status: DISCONTINUED | OUTPATIENT
Start: 2025-04-13 | End: 2025-04-23

## 2025-04-13 RX ORDER — NICOTINE POLACRILEX 4 MG
15 LOZENGE BUCCAL
Status: DISCONTINUED | OUTPATIENT
Start: 2025-04-13 | End: 2025-04-23

## 2025-04-13 NOTE — ED INITIAL ASSESSMENT (HPI)
Pt arrives to ED via EMS for evaluation of AMS, pt arrives with 2LNC, per EMS pt has become increasingly weak, lethargic, and unable/slower to ambulate. Pt daughter denies UTI or UR symptoms, [place]. Per daughter pt typically is up and about, able to converse. Per daughter pt appears to be A&Ox2-3.

## 2025-04-14 LAB
ANION GAP SERPL CALC-SCNC: 12 MMOL/L (ref 0–18)
ATRIAL RATE: 94 BPM
BASOPHILS # BLD AUTO: 0.04 X10(3) UL (ref 0–0.2)
BASOPHILS NFR BLD AUTO: 0.4 %
BUN BLD-MCNC: 122 MG/DL (ref 9–23)
CALCIUM BLD-MCNC: 10.1 MG/DL (ref 8.7–10.6)
CHLORIDE SERPL-SCNC: 123 MMOL/L (ref 98–112)
CO2 SERPL-SCNC: 22 MMOL/L (ref 21–32)
CREAT BLD-MCNC: 5.35 MG/DL (ref 0.55–1.02)
CREAT UR-SCNC: 152.9 MG/DL
CREAT UR-SCNC: 152.9 MG/DL
EGFRCR SERPLBLD CKD-EPI 2021: 7 ML/MIN/1.73M2 (ref 60–?)
EOSINOPHIL # BLD AUTO: 0.06 X10(3) UL (ref 0–0.7)
EOSINOPHIL NFR BLD AUTO: 0.6 %
ERYTHROCYTE [DISTWIDTH] IN BLOOD BY AUTOMATED COUNT: 13.4 %
EST. AVERAGE GLUCOSE BLD GHB EST-MCNC: 126 MG/DL (ref 68–126)
GLUCOSE BLD-MCNC: 167 MG/DL (ref 70–99)
GLUCOSE BLD-MCNC: 173 MG/DL (ref 70–99)
GLUCOSE BLD-MCNC: 187 MG/DL (ref 70–99)
GLUCOSE BLD-MCNC: 213 MG/DL (ref 70–99)
GLUCOSE BLD-MCNC: 233 MG/DL (ref 70–99)
HBA1C MFR BLD: 6 % (ref ?–5.7)
HCT VFR BLD AUTO: 30.5 % (ref 35–48)
HGB BLD-MCNC: 8.9 G/DL (ref 12–16)
IMM GRANULOCYTES # BLD AUTO: 0.05 X10(3) UL (ref 0–1)
IMM GRANULOCYTES NFR BLD: 0.5 %
LYMPHOCYTES # BLD AUTO: 1.72 X10(3) UL (ref 1–4)
LYMPHOCYTES NFR BLD AUTO: 18.4 %
MAGNESIUM SERPL-MCNC: 3.2 MG/DL (ref 1.6–2.6)
MCH RBC QN AUTO: 28.3 PG (ref 26–34)
MCHC RBC AUTO-ENTMCNC: 29.2 G/DL (ref 31–37)
MCV RBC AUTO: 97.1 FL (ref 80–100)
MICROALBUMIN UR-MCNC: 18.9 MG/DL
MICROALBUMIN/CREAT 24H UR-RTO: 123.6 UG/MG (ref ?–30)
MONOCYTES # BLD AUTO: 0.6 X10(3) UL (ref 0.1–1)
MONOCYTES NFR BLD AUTO: 6.4 %
NEUTROPHILS # BLD AUTO: 6.9 X10 (3) UL (ref 1.5–7.7)
NEUTROPHILS # BLD AUTO: 6.9 X10(3) UL (ref 1.5–7.7)
NEUTROPHILS NFR BLD AUTO: 73.7 %
OSMOLALITY SERPL CALC.SUM OF ELEC: 367 MOSM/KG (ref 275–295)
P AXIS: -10 DEGREES
P-R INTERVAL: 170 MS
PLATELET # BLD AUTO: 128 10(3)UL (ref 150–450)
PLATELETS.RETICULATED NFR BLD AUTO: 5.6 % (ref 0–7)
POTASSIUM SERPL-SCNC: 5.2 MMOL/L (ref 3.5–5.1)
PROT UR-MCNC: 80 MG/DL (ref ?–14)
PROT/CREAT UR-RTO: 0.52
Q-T INTERVAL: 344 MS
QRS DURATION: 70 MS
QTC CALCULATION (BEZET): 430 MS
R AXIS: -38 DEGREES
RBC # BLD AUTO: 3.14 X10(6)UL (ref 3.8–5.3)
SODIUM SERPL-SCNC: 157 MMOL/L (ref 136–145)
T AXIS: 17 DEGREES
VENTRICULAR RATE: 94 BPM
WBC # BLD AUTO: 9.4 X10(3) UL (ref 4–11)

## 2025-04-14 PROCEDURE — 99223 1ST HOSP IP/OBS HIGH 75: CPT | Performed by: INTERNAL MEDICINE

## 2025-04-14 NOTE — OCCUPATIONAL THERAPY NOTE
OCCUPATIONAL THERAPY  EVALUATION - INPATIENT     Room Number: 426/426-A  Evaluation Date: 4/14/2025  Type of Evaluation: Initial  Presenting Problem: UTI, severe dehydration, Hypernatremia, AMS, fall, RUE pain    Physician Order: IP Consult to Occupational Therapy  Reason for Therapy: ADL/IADL Dysfunction and Discharge Planning    OCCUPATIONAL THERAPY ASSESSMENT   Patient is currently functioning below baseline with self-care and functional mobility.   Prior to admission, patient's baseline is supervision to min A w/ use of RW  Patient is requiring maximum assistance as a result of the following impairments: decreased functional strength, decreased functional reach, decreased endurance, impaired   balance, impaired coordination, impaired motor planning, decreased muscular endurance, difficulty maintaining precautions, cognitive deficits ( ), medical status, and decreased safety awareness.   Occupational Therapy will continue to follow for duration of hospitalization.    Patient will benefit from continued skilled OT Services to promote return to prior level of function and safety with continuous assistance and gradual rehabilitative therapy      History Related to Current Admission: Patient is a 89 year old female admitted on 4/13/2025 with Presenting Problem: UTI, severe dehydration, Hypernatremia, AMS, fall, RUE pain. Co-Morbidities : dementia, MI, DM, CKD, HTN    Imaging: R shoulder xray neg for acute fx, CTH neg, C-spine CT neg acute fx, severe stenosis C4-5  CTH: neg for acute process    FUNCTIONAL TRANSFER ASSESSMENT  Sit to Stand: Edge of Bed; Chair  Edge of Bed: Moderate Assist  Chair: Maximum Assist    BED MOBILITY  Rolling: Moderate Assist  Supine to Sit : Moderate Assist  Sit to Supine (OT): Not Tested  Scooting: mod A    BALANCE ASSESSMENT  Static Sitting: Contact Guard Assist  Static Standing: Moderate Assist    FUNCTIONAL ADL ASSESSMENT  LB Dressing Seated: Maximum Assist    ACTIVITY TOLERANCE:    Pulse: 54  Heart Rate Source: Monitor                   O2 SATURATIONS  Oxygen Therapy  SPO2% on Room Air at Rest: 95  SPO2% Ambulation on Room Air: 95    COGNITION  Arousal/Alertness:  lethargic  Attention Span:  attends with cues to redirect  Orientation Level:  oriented to place, oriented to person, disoriented to time, and disoriented to situation  Memory:  impaired working memory, decreased recall of precautions, and decreased recall of recent events  Following Commands:  follows one step commands with increased time and follows one step commands with repetition  Initiation: cues to initiate tasks and hand over hand to initiate tasks  Motor Planning: impaired  Perseveration: not present  Safety Judgement:  decreased awareness of need for safety  Awareness of Errors:  assistance required to identify errors made, assistance required to correct errors made, and decreased awareness of errors   Awareness of Deficits:  decreased awareness of deficits      Upper extremity  BUE AROM and strength WFL except:  R shoulder limited to <1/2 active range w/ c/o pain    EDUCATION PROVIDED  Patient Education : Role of Occupational Therapy; Plan of Care; DME Recommendations; Functional Transfer Techniques; Fall Prevention; Posture/Positioning; Edema Reduction; Energy Conservation; Proper Body Mechanics  Patient's Response to Education: Verbalized Understanding; Requires Further Education  Family/Caregiver Education : Role of Occupational Therapy; Plan of Care  Family/Caregiver's Response to Education: Verbalized Understanding      Therapist comments: OT educated patient on safety,  sequencing, energy conservation, pain management, home modifications and adaptive equipment to increase independence with ADLs.  EOB to chair with mod A of 2 and RW      Patient End of Session: Up in chair, Needs met, Call light within reach, RN aware of session/findings, All patient questions and concerns addressed, Alarm set, Discussed  recommendations with /    OCCUPATIONAL PROFILE    HOME SITUATION  Type of Home: House  Home Layout: Two level, Stairlift  Lives With: Daughter (pt's legally blind son also lives w/ her)    Toilet and Equipment: Standard height toilet, Grab bar  Shower/Tub and Equipment: Walk-in shower, Grab bar, Shower chair  Other Equipment:  (RW, transport chair)       Hand Dominance: Right  Drives: No  Patient Regularly Uses: Glasses    Prior Level of Function: per daughter, patient requires supervision to min A w/ use of walker in the home.  Patient has been in bed for the past week d/t daughter unable to get her up on her feet.  H/o of a fall one week prior to admit.    SUBJECTIVE   PAIN ASSESSMENT  Rating: Unable to rate  Location: low back and right UE  Management Techniques: Nurse notified    OBJECTIVE  Precautions: Bed/chair alarm  Fall Risk: High fall risk    WEIGHT BEARING RESTRICTION       ASSESSMENTS    AM-PAC '6-Clicks' Inpatient Daily Activity Short Form  -   Putting on and taking off regular lower body clothing?: A Lot  -   Bathing (including washing, rinsing, drying)?: A Lot  -   Toileting, which includes using toilet, bedpan or urinal? : A Lot  -   Putting on and taking off regular upper body clothing?: A Lot  -   Taking care of personal grooming such as brushing teeth?: A Lot  -   Eating meals?: A Lot    AM-PAC Score:  Score: 12  Approx Degree of Impairment: 66.57%  Standardized Score (AM-PAC Scale): 30.6    PLAN  OT Device Recommendations: TBD  OT Treatment Plan: Balance activities, Energy conservation/work simplification techniques, ADL training, Functional transfer training, UE strengthening/ROM, Endurance training, Patient/Family education, Patient/Family training, Cognitive reorientation, Equipment eval/education, Compensatory technique education  Rehab Potential : Good  Frequency: 3-5x/week  Number of Visits to Meet Established Goals: 7    ADL Goals  Patient will perform toileting  with supervision and AE PRN  Patient will perform LB dressing with supervision and AE PRN    Functional Transfer Goals  Patient will perform bed mobility supine to sit with supervision  Patient will perform bed mobility sit to supine with supervision  Patient will perform toilet transfer with supervision    Additional Goals:  Patient will state precautions and maintain during ADL      Patient Evaluation Complexity Level:   Occupational Profile/Medical History MODERATE - Expanded review of history including review of medical or therapy record   Specific performance deficits impacting engagement in ADL/IADL MODERATE  3 - 5 performance deficits   Client Assessment/Performance Deficits MODERATE - Comorbidities and min to mod modifications of tasks    Clinical Decision Making MODERATE - Analysis of occupational profile, detailed assessments, several treatment options    Overall Complexity MODERATE     OT Session Time: 30 minutes  Self-Care Home Management: 15 minutes

## 2025-04-14 NOTE — PROGRESS NOTES
NURSING ADMISSION NOTE      Patient admitted via cart  Oriented to room.  Safety precautions initiated.  Bed in low position.  Call light in reach.  Patient lethargic , but responds with yes and no answers    Went for CT abdomen. Iv fluids continued. Inco of urine. Blood sugar treated per sliding scale.

## 2025-04-14 NOTE — SLP NOTE
ADULT SWALLOWING EVALUATION    ASSESSMENT    ASSESSMENT/OVERALL IMPRESSION:  Patient is a 89 y/o female admitted with AMS and PMHx significant for dementia and DM-2. SLP order received to evaluate oropharyngeal swallow d/t modified diet consistency. Patient received alert in bed with daughter present at bedside who assisted with history. Patient consumes a regular diet and thin liquids at baseline. However, patient is currently without her dentures. Therefore, daughter has been giving her softer foods. She denied observing overt s/s of aspiration with PO intake.     Patient presented with mild oral dysphagia characterized by impaired mastication d/t being without her dentures. Bolus acceptance was adequate without evidence of anterior bolus loss. Pharyngeal swallow initiation appeared timely and hyolaryngeal excursion was adequate per palpation.  Occasional belching observed throughout. Patient also with throat clear x1. Patient denied odynophagia and globus sensation.    Recommend patient continue a soft diet and thin liquids with general safe swallowing precautions. Recommend medications be administered one at a time. SLP will continue to follow to monitor diet tolerance and adjust as appropriate. Education provided re: results and recommendations.         RECOMMENDATIONS   Diet Recommendations - Solids: Soft/ Easy to chew  Diet Recommendations - Liquids: Thin Liquids                           Aspiration Precautions: Upright position, Small bites, Small sips  Medication Administration Recommendations: One pill at a time  Treatment Plan/Recommendations: Aspiration precautions    HISTORY   MEDICAL HISTORY  Reason for Referral: Altered diet consistency    Problem List  Principal Problem:    Hypernatremia  Active Problems:    Hyperkalemia    Urinary tract infection with hematuria, site unspecified    Acute on chronic renal insufficiency    Type 2 MI (myocardial infarction) (HCC)    Dehydration, severe      Past  Medical History  Past Medical History[1]    Prior Living Situation: Home with support  Diet Prior to Admission: Regular, Thin liquids       Patient/Family Goals: none stated    SWALLOWING HISTORY  Current Diet Consistency: Soft/ Easy to Chew  Dysphagia History: as above  Imaging Results:   CXR 4/13/25  CONCLUSION:  There are dependent densities in lower lungs bilaterally greater on the left than the right.  This could represent atelectasis with pneumonia not excluded.         LOCATION:  [facility]                  Dictated by (CST): Jamshid Silverio MD on 4/13/2025 at 9:30 PM       Finalized by (CST): Jamshid Silverio MD on 4/13/2025 at 9:31 PM     CT 4/13/25  CONCLUSION:    1. There is chronic small vessel ischemic change and atrophy noted.   2. There is no acute abnormality detected on the noncontrast CT of the head.             LOCATION:  [facility]         Dictated by (CST): Jamshid Silverio MD on 4/13/2025 at 8:44 PM       Finalized by (CST): Jamshid Silverio MD on 4/13/2025 at 8:47 PM     SUBJECTIVE       OBJECTIVE   ORAL MOTOR EXAMINATION  Dentition: Edentulous (dentures at home)  Symmetry: Within Functional Limits  Strength:  (generalized weakness)     Range of Motion: Within Functional Limits       Voice Quality: Weak  Respiratory Status: Nasal cannula  Consistencies Trialed: Thin liquids, Puree, Soft solid  Method of Presentation: Staff/Clinician assistance  Patient Positioned: Upright, Midline    Oral Phase of Swallow: Impaired              Mastication: Impaired       Pharyngeal Phase of Swallow: Within Functional Limits           (Please note: Silent aspiration cannot be evaluated clinically. Videofluoroscopic Swallow Study is required to rule-out silent aspiration.)    Esophageal Phase of Swallow: No complaints consistent with possible esophageal involvement  Comments: d/w RN              GOALS  Goal #1 The patient will tolerate soft consistency and thin liquids without overt signs or symptoms of aspiration with 90 %  accuracy over 1-2 session(s).  In Progress   Goal #2 The patient/family/caregiver will demonstrate understanding and implementation of aspiration precautions and swallow strategies independently over 1-2 session(s).    In Progress   Goal #3     Goal #4     Goal #5     Goal #6     Goal #7     Goal #8       FOLLOW UP  Treatment Plan/Recommendations: Aspiration precautions  Duration: 1 week  Follow Up Needed (Documentation Required): Yes  SLP Follow-up Date: 04/15/25    Thank you for your referral.   If you have any questions, please contact JANETT Leonardo       [1]   Past Medical History:   Anemia    Arthritis    Back problem    herniated discs L4-L5    Blood disorder    anemia    Cellulitis, leg    Dementia (HCC)    Depression    Elevated blood pressure reading without diagnosis of hypertension    Esophageal reflux    Gastritis    GENITO-URINARY DISEASE    Glaucoma    HIGH BLOOD PRESSURE    HIGH CHOLESTEROL    Kidney disease    Lumbar degenerative disc disease    Lumbar radiculitis    Lumbar spondylosis    MADHU (obstructive sleep apnea)    AHI 5 RDI 5 REM AHI 0 Supine AHI 2 non-supine AHI 6     MADHU (obstructive sleep apnea)    Osteoarthrosis, unspecified whether generalized or localized, unspecified site    Other and unspecified hyperlipidemia    Pain in limb    Personal history of urinary (tract) infection    below th eknee    Renal disorder    elevated BUN and creatinine    Shortness of breath    uses oxygen 2.5 L/NC every night for hx of low saturation identified during sleep study 2014    Spinal stenosis    Type II or unspecified type diabetes mellitus without mention of complication, not stated as uncontrolled    Unspecified acute reaction to stress    Unspecified essential hypertension    Visual impairment    glasses

## 2025-04-14 NOTE — H&P
General Medicine H&P     Chief Complaint   Patient presents with    Altered Mental Status        PCP: Cory Sanchez DO    History of Present Illness: Patient is a 89 year old female with PMH including but not limited to HTN, CKD, DM, HL who p/t [facility] ED c weakness and hyperNa.     Lives c dtr who provides most of hx, son here too. Saw Dr. Sanchez week before, fallen the week before. Pain R shoulder. Weak, worsening. Denies lot of water at home. Body clock off. No strength. No f/c. No dysuria, no cough. Last BM 6d ago      Past Medical History[1]   Past Surgical History[2]     ALL:  Allergies[3]     Scheduled Meds[4]    Social History     Tobacco Use    Smoking status: Never    Smokeless tobacco: Never   Substance Use Topics    Alcohol use: Yes     Comment: rarely        Fam Hx  Family History[5]    Review of Systems  Comprehensive ROS reviewed and negative except for what's stated above. \    OBJECTIVE:  /40 (BP Location: Right arm)   Pulse 66   Temp 97.4 °F (36.3 °C) (Oral)   Resp 18   Ht 4' 9\" (1.448 m)   Wt 126 lb (57.2 kg)   LMP  (LMP Unknown)   SpO2 97%   BMI 27.27 kg/m²     General:  Drowsy, no distress, appears stated age.    Head:  Normocephalic, without obvious abnormality, atraumatic.   Eyes:  Sclera anicteric, EOMs intact.    Nose: Nares normal,  Mucosa normal    Throat: Lips normal   Neck: Supple, symmetrical, trachea midline   Lungs:   Clear to auscultation bilaterally. Normal effort   Chest wall:  No tenderness or deformity   Heart:  Regular rate and rhythm, S1, S2 normal, no murmur, rub or gallop appreciated   Abdomen:   Soft, NT/ND, Bowel sounds normal. No masses,  No organomegaly.    Extremities/MSK: Extremities normal/normal movement, atraumatic, no cyanosis  or edema.   Skin: Skin color, texture, turgor normal. No rashes or lesions.    Neurologic: Moving all extremities spontaneously, no focal deficit appreciated.   Unable to follow commands for full neuro exam               LABS:    Lab Results   Component Value Date    WBC 9.4 04/14/2025    HGB 8.9 04/14/2025    HCT 30.5 04/14/2025    .0 04/14/2025    CREATSERUM 5.35 04/14/2025     04/14/2025     04/14/2025    K 5.2 04/14/2025     04/14/2025    CO2 22.0 04/14/2025     04/14/2025    CA 10.1 04/14/2025    ALB 4.1 04/13/2025    ALKPHO 76 04/13/2025    BILT 0.4 04/13/2025    TP 7.3 04/13/2025    AST 47 04/13/2025    ALT 25 04/13/2025    LIP 82 04/13/2025    MG 3.2 04/14/2025    PGLU 213 04/14/2025       Radiology: CT ABDOMEN+PELVIS KIDNEYSTONE 2D RNDR(NO IV,NO ORAL)(CPT=74176)  Result Date: 4/13/2025  PROCEDURE:  CT ABDOMEN+PELVIS KIDNEYSTONE 2D RNDR(NO IV,NO ORAL)(CPT=74176)  COMPARISON:  None.  INDICATIONS:  r/o kidney stone, constipation  TECHNIQUE:  Unenhanced multislice CT scanning from above the kidneys to below the urinary bladder.  2D rendering are generated on the CT scanner workstation to localize potential stones in the cranio-caudal plane.  Dose reduction techniques were used. Dose information is transmitted to the ACR (American College of Radiology) NRDR (National Radiology Data Registry) which includes the Dose Index Registry.  PATIENT STATED HISTORY: (As transcribed by Technologist)  Constipation with weakness.    FINDINGS:  KIDNEYS:  There are no obstructing stones.  There are vascular calcifications in the renal jamin bilaterally. BLADDER:  There is bladder wall thickening and mild perivesical stranding.  This could represent cystitis. ADRENALS:  No mass or enlargement.  LIVER:  There are calcifications in the gallbladder wall.  There is no evidence of acute cholecystitis. BILIARY:  No visible dilatation or calcification.  PANCREAS:  No lesion, fluid collection, ductal dilatation, or atrophy.  SPLEEN:  No enlargement or focal lesion.  AORTA/VASCULAR:  Aorta is atherosclerotic but not aneurysmal. RETROPERITONEUM:  No mass or adenopathy.  BOWEL/MESENTERY:  No visible mass, obstruction, or bowel wall  thickening.  ABDOMINAL WALL:  No mass or hernia.  BONES:  There is advanced degenerative change in the lumbar spine. PELVIC ORGANS:  Normal for age.  LUNG BASES:  There is dependent atelectasis in the lower lobes. OTHER:  Negative.             CONCLUSION:  1. Mild bladder wall thickening and perivesical stranding could indicate cystitis. 2. There are no renal or ureteral stones.  There are vascular calcifications in renal jamin bilaterally. 3. There are calcifications in the wall of the gallbladder. 4. There is aortic atherosclerosis without aneurysm.     LOCATION:  [facility]   Dictated by (CST): Jamshid Silverio MD on 4/13/2025 at 11:19 PM     Finalized by (CST): Jamshid Silverio MD on 4/13/2025 at 11:23 PM       XR CHEST AP PORTABLE  (CPT=71045)  Result Date: 4/13/2025  PROCEDURE:  XR CHEST AP PORTABLE  (CPT=71045)  TECHNIQUE:  AP chest radiograph was obtained.  COMPARISON:  [facility] , XR, XR CHEST AP PORTABLE  (CPT=71045), 3/19/2021, 11:42 AM.  INDICATIONS:  pna  PATIENT STATED HISTORY: (As transcribed by Technologist)  Patient stated falling and has left hip pain and right shoulder pain.    FINDINGS:  There are dependent densities in lower lungs.  This is more conspicuous at the left lung base.  This could represent dependent atelectasis although pneumonia would not be excluded.  Heart size is within normal limits.  Aorta is atherosclerotic.  Chest wall structures are unremarkable.            CONCLUSION:  There are dependent densities in lower lungs bilaterally greater on the left than the right.  This could represent atelectasis with pneumonia not excluded.   LOCATION:  [facility]      Dictated by (CST): Jamshid Silverio MD on 4/13/2025 at 9:30 PM     Finalized by (CST): Jamshid Silverio MD on 4/13/2025 at 9:31 PM       XR HIP W OR WO PELVIS 2 OR 3 VIEWS, LEFT (CPT=73502)  Result Date: 4/13/2025  PROCEDURE:  XR HIP W OR WO PELVIS 2 OR 3 VIEWS, LEFT (CPT=73502)  TECHNIQUE:  Unilateral 2 to 3 views of the hip and pelvis if performed.   COMPARISON:  None.  INDICATIONS:  FALL, R/O PELVIC/HIP FX  PATIENT STATED HISTORY: (As transcribed by Technologist)  Patient stated falling and has left hip pain.    FINDINGS:  BONES:  There is joint space narrowing in the left hip with marginal osteophyte formation.  There is no acute fracture. SOFT TISSUES:  Negative.  No visible soft tissue swelling. EFFUSION:  None visible. OTHER:  Negative.            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  There is no acute fracture detected.   LOCATION:  [facility]   Dictated by (CST): Jamshid Silverio MD on 4/13/2025 at 9:29 PM     Finalized by (CST): Jamshid Silverio MD on 4/13/2025 at 9:30 PM       XR SHOULDER, COMPLETE (MIN 2 VIEWS), RIGHT (CPT=73030)  Result Date: 4/13/2025  PROCEDURE:  XR SHOULDER, COMPLETE (MIN 2 VIEWS), RIGHT (CPT=73030)  TECHNIQUE:  Multiple views were obtained.  COMPARISON:  [facility] , CT, CT SPINE CERVICAL (CPT=72125), 4/13/2025, 8:14 PM.  INDICATIONS:  R/O FX  PATIENT STATED HISTORY: (As transcribed by Technologist)  Patient stated falling and has right anterior shoulder pain.    FINDINGS:  There is no acute fracture detected.  There is extensive calcium hydroxyapatite deposition along rotator cuff consistent with calcific tendinopathy.  There is osteoarthritis of the glenohumeral joint and the acromioclavicular joint.            CONCLUSION:  There are degenerative changes and there are findings of calcific tendinopathy.  There is no acute fracture detected.   LOCATION:  [facility]   Dictated by (CST): Jamshid Silverio MD on 4/13/2025 at 9:20 PM     Finalized by (CST): Jamshid Silverio MD on 4/13/2025 at 9:21 PM       CT SPINE CERVICAL (CPT=72125)  Result Date: 4/13/2025  PROCEDURE:  CT SPINE CERVICAL (CPT=72125)  COMPARISON:  None.  INDICATIONS:  FALL, R/O CSPINE INJURY  TECHNIQUE:  Noncontrast CT scanning of the cervical spine is performed from the skull base through C7.  Multiplanar reconstructions are generated.  Dose reduction techniques were used.  Dose information is transmitted to the ACR (American College of Radiology) NRDR (National Radiology Data Registry) which includes the Dose Index Registry.  PATIENT STATED HISTORY: (As transcribed by Technologist)  Fall with weakness.    FINDINGS:  CRANIOCERVICAL AREA:  There is degenerative change at the anterior atlantoaxial joint. PARASPINAL AREA:  Normal with no visible mass.  BONES:  No fracture, pars defect, or osseous lesion.  CERVICAL DISC LEVELS: C2-C3:  No significant disc/facet abnormality, spinal stenosis, or foraminal stenosis. C3-C4:  Diffuse endplate osteophyte and facet hypertrophy cause mild central canal and moderate bilateral foraminal stenosis. C4-C5:  There is diffuse endplate osteophyte.  There is ossification of the posterior longitudinal ligament.  There is ossification of the ligamentum flavum.  There is facet hypertrophy.  There is severe central canal stenosis.  There is moderate bilateral foraminal stenosis. C5-C6:  Diffuse endplate osteophyte and bilateral uncovertebral joint osteophytes cause mild central canal stenosis and moderate bilateral foraminal stenosis. C6-C7:  Diffuse endplate osteophyte and bilateral uncovertebral joint osteophytes cause mild central canal and moderate bilateral foraminal stenosis. C7-T1:  Diffuse endplate osteophyte and bilateral uncovertebral joint osteophytes cause mild central canal and bilateral foraminal stenosis.            CONCLUSION:  There is multilevel degenerative disc disease in the cervical spine which is described in detail level by level above.  There is suggestion of severe stenosis of the central canal at C4-C5.  There is no acute fracture detected.    LOCATION:  [facility]   Dictated by (CST): Jamshid Silverio MD on 4/13/2025 at 8:47 PM     Finalized by (CST): Jamshid Silverio MD on 4/13/2025 at 8:50 PM       CT BRAIN OR HEAD (CPT=70450)  Result Date: 4/13/2025  PROCEDURE:  CT BRAIN OR HEAD (23793)  COMPARISON:  [facility] , CT, CT BRAIN OR HEAD (72808),  3/19/2021, 12:40 PM.  INDICATIONS:  FALL, R/O ICH  TECHNIQUE:  Noncontrast CT scanning is performed through the brain. Dose reduction techniques were used. Dose information is transmitted to the ACR (American College of Radiology) NRDR (National Radiology Data Registry) which includes the Dose Index Registry.  PATIENT STATED HISTORY: (As transcribed by Technologist)  Fall with weakness.    FINDINGS:  VENTRICLES/SULCI:  Ventricles and sulci are prominent compatible with atrophy. INTRACRANIAL:  There is multifocal and confluent decreased attenuation in periventricular white matter which is consistent with chronic small vessel ischemic change.  There is no specific evidence of acute ischemia, hemorrhage or mass. SINUSES:           No sign of acute sinusitis.  MASTOIDS:          No sign of acute inflammation. SKULL:             No evidence for fracture or osseous abnormality. OTHER:             None.            CONCLUSION:  1. There is chronic small vessel ischemic change and atrophy noted. 2. There is no acute abnormality detected on the noncontrast CT of the head.     LOCATION:  [facility]   Dictated by (CST): Jamshid Silverio MD on 4/13/2025 at 8:44 PM     Finalized by (CST): Jamshid Silverio MD on 4/13/2025 at 8:47 PM            ASSESSMENT / PLAN:    89 year old female with PMH including but not limited to HTN, CKD, DM, HL who p/t [facility] ED c weakness and hyperNa.     Weakness  FTT  Prior Falls   - likely 2/2 hyperNa and dementia and UTI  - PT/OT/SLP  - follow PO intake  - CTH chronic small vessel ischemic change   - CT spine multilevel degenerative disc disease in the cervical spine   - XR hip/shoulder no fx  - CXR noted, follow clinical sxs       Abnormal UA, UTI  - IV CTX  - await Ucx     Acute kidney injury on CKD4:   - B/l creatinine of 2.4-2.9 mg/dL in setting of long-standing HTN, diabetic nephropathy and advanced age. follows c Dr. Manzano   - apprec recs  - avoid nephrotoxins  - CT bladder wall thickening, no renal or  ureteral stones.      Hypernatremia   On hypotonic fluid per renal  - follow BMP    Hyperkalemia   - per renal, IVF     HTN  - holding arb/toresemide     # constipation  bowel regimen    # Type 2 diabetes mellitus, controlled A1c 6.0   -Hyperglycemic protocol with accu-checks QID and low-dose sliding scale insulin. Will keep 1800 ADA diet. Will hold PO meds  - stressed imporatnce of BG control and compliance c meds to prevent long-term cardiac, vascular, neuro, renal complications       # Proph  - sqh      Dispo: med  -Lives c dtr       Outpatient records or previous hospital records reviewed. [facility] hospitalist to continue to follow patient while in house. A total of 75 minutes taken.  Greater than 50% face to face encounter.      Kentrell Nielsen MD  [facility] Hospitalist  Pager: [phone]  4/14/2025  1:20 PM       [1]   Past Medical History:   Anemia    Arthritis    Back problem    herniated discs L4-L5    Blood disorder    anemia    Cellulitis, leg    Dementia (HCC)    Depression    Elevated blood pressure reading without diagnosis of hypertension    Esophageal reflux    Gastritis    GENITO-URINARY DISEASE    Glaucoma    HIGH BLOOD PRESSURE    HIGH CHOLESTEROL    Kidney disease    Lumbar degenerative disc disease    Lumbar radiculitis    Lumbar spondylosis    MADHU (obstructive sleep apnea)    AHI 5 RDI 5 REM AHI 0 Supine AHI 2 non-supine AHI 6     MADHU (obstructive sleep apnea)    Osteoarthrosis, unspecified whether generalized or localized, unspecified site    Other and unspecified hyperlipidemia    Pain in limb    Personal history of urinary (tract) infection    below th eknee    Renal disorder    elevated BUN and creatinine    Shortness of breath    uses oxygen 2.5 L/NC every night for hx of low saturation identified during sleep study 2014    Spinal stenosis    Type II or unspecified type diabetes mellitus without mention of complication, not stated as uncontrolled    Unspecified acute reaction to  stress    Unspecified essential hypertension    Visual impairment    glasses   [2]   Past Surgical History:  Procedure Laterality Date    Angiogram  FEB 2013    Angiogram  Feb 2013    Done in [facility]    Appendectomy  1949    Appendectomy      Cataract surgery, complex  08/2010    Cath percutaneous  transluminal coronary angioplasty  2/2012    Normal in [facility]    Colonoscopy N/A 11/15/2016    Procedure: COLONOSCOPY;  Surgeon: Levar Callaway MD;  Location:  [facility]    Colonoscopy & polypectomy  11/15/16= Polyp (serrated)    Repeat PRN    Fluor gid & loclzj ndl/cath spi dx/ther njx  8/30/2013    Procedure: LUMBAR EPIDURAL;  Surgeon: Srini Benjamin MD;  Location: [facility] [place] [facility]    Fluor gid & loclzj ndl/cath spi dx/ther njx  9/16/2013    Procedure: LUMBAR EPIDURAL;  Surgeon: Srini Benjamin MD;  Location: [facility] [place] [facility]    Fluor gid & loclzj ndl/cath spi dx/ther njx  10/8/2013    Procedure: LUMBAR EPIDURAL;  Surgeon: Srini Benjamin MD;  Location: [facility] [place] [facility]    Fluor gid & loclzj ndl/cath spi dx/ther njx  6/24/2014    Procedure: ;  Surgeon: Srini Benjamin MD;  Location: [facility] [place] [facility]    Fluor gid & loclzj ndl/cath spi dx/ther njx N/A 7/24/2014    Procedure: LUMBAR EPIDURAL;  Surgeon: Srini Benjamin MD;  Location: [facility] [place] [facility]    Hysterectomy      Hysterectomy  1974    Injection, w/wo contrast, dx/therapeutic substance, epidural/subarachnoid; lumbar/sacral  8/30/2013    Procedure: LUMBAR EPIDURAL;  Surgeon: Srini Benjamin MD;  Location: [facility] [place] [facility]    Injection, w/wo contrast, dx/therapeutic substance, epidural/subarachnoid; lumbar/sacral  9/16/2013    Procedure: LUMBAR EPIDURAL;  Surgeon: Srini Benjamin MD;  [facility]: [facility]    Injection, w/wo contrast, dx/therapeutic substance, epidural/subarachnoid; lumbar/sacral  10/8/2013    Procedure: LUMBAR EPIDURAL;  Surgeon: Srini Benjamin MD;   [facility]    Injection, w/wo contrast, dx/therapeutic substance, epidural/subarachnoid; lumbar/sacral N/A 6/24/2014    Procedure: LUMBAR EPIDURAL;  Surgeon: Srini Benjamin MD;  [place] [facility]    Injection, w/wo contrast, dx/therapeutic substance, epidural/subarachnoid; lumbar/sacral N/A 7/24/2014    Procedure: LUMBAR EPIDURAL;  Surgeon: Srini Benjamin MD;  [place] [facility]    M-sedaj by  phys perfrmg svc 5+ yr  8/30/2013    Procedure: LUMBAR EPIDURAL;  Surgeon: Srini Benjamin MD;  [facility]: [facility]    M-sedaj by  phys perfrmg svc 5+ yr  9/16/2013    Procedure: LUMBAR EPIDURAL;  Surgeon: Srini Benjamin MD;  [facility]: [facility]    M-sedaj by  phys perfrmg svc 5+ yr  10/8/2013    Procedure: LUMBAR EPIDURAL;  Surgeon: Srini Benjamin MD;  Location: [facility]    M-sedaj by  phys perfrmg svc 5+ yr  6/24/2014    Procedure: ;  Surgeon: Srini Benjamin MD;  Location: [facility]    M-sedaj by  phys perfrmg svc 5+ yr N/A 7/24/2014    Procedure: LUMBAR EPIDURAL;  Surgeon: Srini Benjamin MD;  Location: [facility]    Patient documented not to have experienced any of the following events  8/30/2013    Procedure: LUMBAR EPIDURAL;  Surgeon: Srini Benjamin MD;  [facility]    Patient documented not to have experienced any of the following events  9/16/2013    Procedure: LUMBAR EPIDURAL;  Surgeon: Srini Benjamin MD;  [facility]    Patient documented not to have experienced any of the following events  10/8/2013    Procedure: LUMBAR EPIDURAL;  Surgeon: Srini Benjamin MD;  [facility]    Patient documented not to have experienced any of the following events  6/24/2014    Procedure: ;  Surgeon: Srini Benjamin MD;  [facility]: [facility]    Patient documented not to have experienced any of the following events N/A 7/24/2014    Procedure: LUMBAR EPIDURAL;  Surgeon: Srini Benjamin MD;  Location: [facility] [place] [facility]    Patient withough preoperative order for iv antibiotic surgical site infection prophylaxis.  8/30/2013    Procedure: LUMBAR EPIDURAL;  Surgeon: Srini Benjamin MD;  Location: [facility]    Patient withough preoperative order for iv antibiotic surgical site infection prophylaxis.  9/16/2013    Procedure: LUMBAR EPIDURAL;  Surgeon: Srini Benjamin MD;  Location: [facility]    Patient withough preoperative order for iv antibiotic surgical site infection prophylaxis.  10/8/2013    Procedure: LUMBAR EPIDURAL;  Surgeon: Srini Benjamin MD;  Location: [facility]    Patient withough preoperative order for iv antibiotic surgical site infection prophylaxis.  6/24/2014    Procedure: ;  Surgeon: Srini Benjamin MD;  Location: [facility]    Patient withough preoperative order for iv antibiotic surgical site infection prophylaxis. N/A 7/24/2014    Procedure: LUMBAR EPIDURAL;  Surgeon: Srini Benjamin MD;  Location: [facility]    Upper gi endoscopy performed  10/7/13   [3]   Allergies  Allergen Reactions    Bee Venom SWELLING   [4] insulin aspart, 1-10 Units, TID AC and HS  cefTRIAXone, 1 g, Q24H  heparin, 5,000 Units, 2 times per day  [5]   Family History  Problem Relation Age of Onset    Cancer Father         Esophagus (1964)    Diabetes Mother     Hypertension Sister     Obesity Sister     Other (gastric cancer) Sister     Other (malignant pancreatic neoplasm) Sister     Cancer Sister         Colon (2009)    Cancer Sister         Pancreas (2001)

## 2025-04-14 NOTE — PHYSICAL THERAPY NOTE
PHYSICAL THERAPY EVALUATION - INPATIENT     Room Number: 426/426-A  Evaluation Date: 4/14/2025  Type of Evaluation: Initial  Physician Order: PT Eval and Treat    Presenting Problem: AMS, incr weakness, lethargic  Co-Morbidities : dementia, MI, DM, CKD, HTN  Reason for Therapy: Mobility Dysfunction and Discharge Planning    IMAGING:  CT HEAD  (-), CT Cervical (-), XR L hip (-), XR R shoulder (-), CT Abdomen (-)    PHYSICAL THERAPY ASSESSMENT   Patient is a 89 year old female admitted 4/13/2025 for AMS, incr weakness.  Prior to admission, patient's baseline is [place] with RW.  Patient is currently functioning below baseline with bed mobility, transfers, gait, stair negotiation, maintaining seated position, and standing prolonged periods.  Patient is requiring moderate assist and maximum assist as a result of the following impairments: decreased functional strength, decreased endurance/aerobic capacity, pain, impaired static and dynamic balance, decreased muscular endurance, cognitive deficits (incr confusion/decr orientation), and medical status.  Physical Therapy will continue to follow for duration of hospitalization.    Patient will benefit from continued skilled PT Services to promote return to prior level of function and safety with continuous assistance and gradual rehabilitative therapy .    PLAN DURING HOSPITALIZATION  Nursing Mobility Recommendation : 1 Assist  PT Device Recommendation: Rolling walker  PT Treatment Plan: Bed mobility, Body mechanics, Coordination, Endurance, Energy conservation, Patient education, Family education, Gait training, Neuromuscular re-educate, Range of motion, Stoop training, Strengthening, Stair training, Transfer training, Balance training  Rehab Potential : Fair  Frequency (Obs): 3-5x/week     CURRENT GOALS  Goal #1 Patient is able to demonstrate supine - sit EOB @ level: supervision     Goal #2 Patient is able to demonstrate transfers Sit to/from Stand at assistance level:  supervision     Goal #3 Patient is able to ambulate 50 feet with assist device: walker - rolling at assistance level: supervision     Goal #4 Patient is able to ambulate 100 feet with RW at supervision.   Goal #5 Patient is able to ascend/descend 3 steps with HHA at CGA.    Goal #6    Goal Comments: Goals established on 4/14/2025    PHYSICAL THERAPY MEDICAL/SOCIAL HISTORY  History related to current admission: Patient is a 89 year old female admitted on 4/13/2025 from home for incr lethargy,  altered mental status.  Pt diagnosed with failure to thrive, UTI.    HOME SITUATION  Type of Home: House  Home Layout: Two level, Stairlift  Stairs to Enter : 3   Railing: No              Lives With: Daughter (pt's legally blind son also lives w/ her)    Drives: No   Patient Regularly Uses: Glasses      Prior Level of [place]:   Per pt's daughter >> Pt resides in two story home with daughter and son who is legally blind. Bed/bath on second floor via stairlift. She ambulates with RW for household distances, use of wheelchair for longer distances. Family assists with 3 steps to enter via HHA. Had an unwitnessed fall (maybe a week ago), with subsequent R shoulder pain (imaging negative). Daughter reports significant decline in last week- she has been having to walk her to the bathroom. All she has been doing is ambulating to/from bed to bathroom. Daughter has been having to assist with bed mobility.   Typically her routine is to get up in the morning, get ready, go downstairs and spend the day downstairs until about 9PM.     SUBJECTIVE  \"I'm confused.\"     OBJECTIVE  Precautions: Bed/chair alarm  Fall Risk: High fall risk    WEIGHT BEARING RESTRICTION     PAIN ASSESSMENT  Rating: Unable to rate  Location: R shoulder  Management Techniques: Activity promotion, Body mechanics, Repositioning    COGNITION  Overall Cognitive Status:  Impaired  Arousal/Alertness:  lethargic  Orientation Level:  oriented to place, oriented to  person, disoriented to time, and disoriented to situation  Following Commands:  follows one step commands with increased time and follows one step commands with repetition    RANGE OF MOTION AND STRENGTH ASSESSMENT  Upper extremity ROM and strength are within functional limits  Lower extremity ROM is within functional limits   Lower extremity strength is within functional limits     BALANCE  Static Sitting: Fair  Dynamic Sitting: Fair -  Static Standing: Poor +  Dynamic Standing: Poor    ADDITIONAL TESTS                                    ACTIVITY TOLERANCE                         O2 WALK       NEUROLOGICAL FINDINGS                        AM-PAC '6-Clicks' INPATIENT SHORT FORM - BASIC MOBILITY  How much difficulty does the patient currently have...  Patient Difficulty: Turning over in bed (including adjusting bedclothes, sheets and blankets)?: A Lot   Patient Difficulty: Sitting down on and standing up from a chair with arms (e.g., wheelchair, bedside commode, etc.): A Lot   Patient Difficulty: Moving from lying on back to sitting on the side of the bed?: A Lot   How much help from another person does the patient currently need...   Help from Another: Moving to and from a bed to a chair (including a wheelchair)?: A Lot   Help from Another: Need to walk in hospital room?: A Lot   Help from Another: Climbing 3-5 steps with a railing?: A Lot     AM-PAC Score:  Raw Score: 12   Approx Degree of Impairment: 68.66%   Standardized Score (AM-PAC Scale): 35.33   CMS Modifier (G-Code): CL    FUNCTIONAL ABILITY STATUS  Gait Assessment   Functional Mobility/Gait Assessment  Gait Assistance: Not tested    Skilled Therapy Provided   Educated on importance of continued out of bed mobility with assistance from nursing staff (recommend 2 person assist stand pivot)    Bed mobility with HOB elevated> sit to stand to RW> returned sitting EOB> sit to stand again to RW> a few side shuffle steps from bed>bedside chair> upright in chair at  endo f session    *Pt understands english, but also speaks/understands Vincentian- co-evaling OT (Fiordaliza) assisting with translation/conversation       Bed Mobility:  Rolling: NT  Supine to sit: maxA x 2 with HOB elevated (due to pt's decr alertness)   Sit to supine: NT     Transfer Mobility:  Sit to stand: modA to RW   Stand to sit: modA  Lateral steps with RW to bedside chair:modA  Gait = NT    Therapist's Comments:   RN gave clearance to see patient. Discussed role and goal of physical therapy in hospital setting. Daughter (Rubi) present during session. Pt in agreement to session.     Exercise/Education Provided:  Bed mobility  Body mechanics  Energy conservation  Functional activity tolerated  Posture  Transfer training    Patient End of Session: Up in chair, Needs met, Call light within reach, RN aware of session/findings, All patient questions and concerns addressed, Hospital anti-slip socks, Alarm set, Discussed recommendations with /, Family present      Patient Evaluation Complexity Level:  History Moderate - 1 or 2 personal factors and/or co-morbidities   Examination of body systems Low -  addressing 1-2 elements   Clinical Presentation Low- Stable   Clinical Decision Making Low Complexity       PT Session Time: 30 minutes  Therapeutic Activity: 15 minutes

## 2025-04-14 NOTE — ED QUICK NOTES
Orders for admission, patient is aware of plan and ready to go upstairs. Any questions, please call ED RN Eliana at extension [phone].     Patient Covid vaccination status: Fully vaccinated     COVID Test Ordered in ED: None    COVID Suspicion at Admission: N/A    Running Infusions:     Mental Status/LOC at time of transport: alert    Other pertinent information:   CIWA score: N/A   NIH score:  N/A

## 2025-04-14 NOTE — CONSULTS
[facility]  Report of Consultation    Ann Herndon Patient Status:  Inpatient    1925 MRN [id number]   Location [facility] 4NW-A Attending Manuel Chan MD   Hosp Day # 1 PCP Cory Sanchez DO     Reason for Consultation:  Hypernatremia, acute kidney injury    History of Present Illness:  Ann Herndon is a 89 year old female with history of HTN, CKD, DM2 and HLD who presented with lethargy and worsening mental status. Nephrology consulted for acute kidney injury and hypernatremia.     She was brought in for one week of progressive decline. Typically active and ambulatory but over the past week, they noticed that she was more lethargic and not eating. Had decreased activity level as well. Saw PCP on , obtained labs showing serum creatinine 2.91, bicarb 16. In the ED, serum creatinine was 5.32 mg/dL, sodium 158 and K 5.4, Started on IVF and ceftriaxone.     History:  Past Medical History[1]  Past Surgical History[2]  Family History[3]  Denies family history of kidney disease.    reports that she has never smoked. She has never used smokeless tobacco. She reports current alcohol use. She reports that she does not use drugs.    Allergies:  Allergies[4]    Medications:  Current Hospital Medications[5]  Prior to Admission Medications[6]    Review of Systems:  Please see HPI for pertinent positives. 10 point review of systems otherwise reviewed and negative.     Physical Exam:  /36 (BP Location: Right arm)   Pulse 60   Temp 97.8 °F (36.6 °C) (Axillary)   Resp 16   Ht 4' 9\" (1.448 m)   Wt 126 lb (57.2 kg)   LMP  (LMP Unknown)   SpO2 97%   BMI 27.27 kg/m²   Temp (24hrs), Av.7 °F (36.5 °C), Min:97.5 °F (36.4 °C), Max:97.8 °F (36.6 °C)       Intake/Output Summary (Last 24 hours) at 2025 0882  Last data filed at 2025 0575  Gross per 24 hour   Intake 800 ml   Output 150 ml   Net 650 ml     Wt Readings from Last 3 Encounters:   25 126 lb (57.2 kg)   23 135  lb (61.2 kg)   07/05/22 135 lb 2 oz (61.3 kg)     General: lethargic, but responds to questions,   HEENT: No scleral icterus, MMM  Neck: Supple, no EILEEN or thyromegaly  Cardiac: Regular rate and rhythm, S1, S2 normal  Lungs: Clear to auscultation  Abdomen: Soft, non-tender.  Extremities: Without clubbing, cyanosis; no edema  Neurologic: moving all extremities  Skin: Warm and dry, no rashes        Laboratory Data:  Recent Labs   Lab 04/13/25 1845 04/14/25  0630   WBC 11.2* 9.4   HGB 10.3* 8.9*   MCV 95.9 97.1   .0 128.0*       Recent Labs   Lab 04/13/25 1845 04/14/25  0630   * 157*   K 5.4* 5.2*   * 123*   CO2 22.0 22.0   * 122*   CREATSERUM 5.32* 5.35*   CA 10.7* 10.1   MG  --  3.2*   * 167*       Recent Labs   Lab 04/13/25  1845   ALT 25   AST 47*   ALB 4.1       Recent Labs   Lab 04/13/25  1852 04/13/25  2251 04/14/25  0524   PGLU 252* 221* 173*       Imaging:  All imaging studies reviewed.    Assessment / Plan:    1) Acute kidney injury on CKD4: Has long-standing chronic kidney disease stage 4 with baseline serum creatinine of 2.4-2.9 mg/dL in setting of long-standing HTN, diabetic nephropathy and advanced age. Now presenting with acute kidney injury and hypernatremia in setting of worsening mental status and poor PO intake with concomitant diuretic use. JOSE ANTONIO likely 2/2 pre-renal azotemia - will continue IVF, rate adjusted this morning. Continue to trend. Quantify proteinuria.     2) Hypernatremia: Due to decreased free water intake d/t worsening mental status. On hypotonic fluids, rate adjusted.     3) HTN: controlled currently, hold home losartan and torsemide. Can resume hydralazine if BP rises    4) Hyperkalemia: Due to JOSE ANTONIO and decreased distal sodium delivery. Continue IVF.     Thank you for allowing me to participate in this patient's care. Please feel free to call me with any questions or concerns.     Kirsty Jimenez MD  04/14/25       [1]   Past Medical History:   Anemia     Arthritis    Back problem    herniated discs L4-L5    Blood disorder    anemia    Cellulitis, leg    Dementia (HCC)    Depression    Elevated blood pressure reading without diagnosis of hypertension    Esophageal reflux    Gastritis    GENITO-URINARY DISEASE    Glaucoma    HIGH BLOOD PRESSURE    HIGH CHOLESTEROL    Kidney disease    Lumbar degenerative disc disease    Lumbar radiculitis    Lumbar spondylosis    MADHU (obstructive sleep apnea)    AHI 5 RDI 5 REM AHI 0 Supine AHI 2 non-supine AHI 6     MADHU (obstructive sleep apnea)    Osteoarthrosis, unspecified whether generalized or localized, unspecified site    Other and unspecified hyperlipidemia    Pain in limb    Personal history of urinary (tract) infection    below th eknee    Renal disorder    elevated BUN and creatinine    Shortness of breath    uses oxygen 2.5 L/NC every night for hx of low saturation identified during sleep study 2014    Spinal stenosis    Type II or unspecified type diabetes mellitus without mention of complication, not stated as uncontrolled    Unspecified acute reaction to stress    Unspecified essential hypertension    Visual impairment    glasses   [2]   Past Surgical History:  Procedure Laterality Date    Angiogram  FEB 2013    Angiogram  Feb 2013    Done in [facility]    Appendectomy  1949    Appendectomy      Cataract surgery, complex  08/2010    Cath percutaneous  transluminal coronary angioplasty  2/2012    Normal in [facility]    Colonoscopy N/A 11/15/2016    Procedure: COLONOSCOPY;  Surgeon: Levar Callaway MD;  Location:  ENDOSCOPY    Colonoscopy & polypectomy  11/15/16= Polyp (serrated)    Repeat PRN    Fluor gid & loclzj ndl/cath spi dx/ther njx  8/30/2013    Procedure: LUMBAR EPIDURAL;  Surgeon: Srini Benjamin MD;  Location: [facility]    Fluor gid & loclzj ndl/cath spi dx/ther njx  9/16/2013    Procedure: LUMBAR EPIDURAL;  Surgeon: Srini Benjamin MD;  Location: [facility] [place] [facility]    Fluor gid &  loclzj ndl/cath spi dx/ther njx  10/8/2013    Procedure: LUMBAR EPIDURAL;  Surgeon: Srini Benjamin MD;  [facility]: [facility]    Fluor gid & loclzj ndl/cath spi dx/ther njx  6/24/2014    Procedure: ;  Surgeon: Srini Benjamin MD;  Location: [facility]    Fluor gid & loclzj ndl/cath spi dx/ther njx N/A 7/24/2014    Procedure: LUMBAR EPIDURAL;  Surgeon: Srini Benjamin MD;  Location: [facility]    Hysterectomy      Hysterectomy  1974    Injection, w/wo contrast, dx/therapeutic substance, epidural/subarachnoid; lumbar/sacral  8/30/2013    Procedure: LUMBAR EPIDURAL;  Surgeon: Srini Benjamin MD;  Location: [facility]    Injection, w/wo contrast, dx/therapeutic substance, epidural/subarachnoid; lumbar/sacral  9/16/2013    Procedure: LUMBAR EPIDURAL;  Surgeon: Srini Benjamin MD;  Location: [facility]    Injection, w/wo contrast, dx/therapeutic substance, epidural/subarachnoid; lumbar/sacral  10/8/2013    Procedure: LUMBAR EPIDURAL;  Surgeon: Srini Benjamin MD;  Location: [facility]    Injection, w/wo contrast, dx/therapeutic substance, epidural/subarachnoid; lumbar/sacral N/A 6/24/2014    Procedure: LUMBAR EPIDURAL;  Surgeon: Srini Benjamin MD;  Location: [facility]    Injection, w/wo contrast, dx/therapeutic substance, epidural/subarachnoid; lumbar/sacral N/A 7/24/2014    Procedure: LUMBAR EPIDURAL;  Surgeon: Srini Benajmin MD;  Location: [facility]    M-sedaj by  phys perfrmg svc 5+ yr  8/30/2013    Procedure: LUMBAR EPIDURAL;  Surgeon: Srini Benjamin MD;  Location: [facility]    M-sedaj by  phys perfrmg svc 5+ yr  9/16/2013    Procedure: LUMBAR EPIDURAL;  Surgeon: Srini Benjamin MD;  Location: [facility]    M-sedaj by  phys perfrmg svc 5+ yr  10/8/2013    Procedure: LUMBAR EPIDURAL;  Surgeon: Srini Benjamin,  MD;  [facility]    M-sedaj by  phys perfrmg [facility] 5+ yr  6/24/2014    Procedure: ;  Surgeon: Srini Benjamin MD;  [facility]    [facility] N/A 7/24/2014    Procedure: LUMBAR EPIDURAL;  Surgeon: Srini Benjamin MD;  [facility] [place] [facility]    Patient documented not to have experienced any of the following events  8/30/2013    Procedure: LUMBAR EPIDURAL;  Surgeon: Srini Benjamin MD;  [facility]    Patient documented not to have experienced any of the following events  9/16/2013    Procedure: LUMBAR EPIDURAL;  Surgeon: Srini Benjamin MD;  [facility] [place] [facility]    Patient documented not to have experienced any of the following events  10/8/2013    Procedure: LUMBAR EPIDURAL;  Surgeon: Srini Benjamin MD;  [facility] [place] [facility]    Patient documented not to have experienced any of the following events  6/24/2014    Procedure: ;  Surgeon: Srini Benjamin MD;  Location: [facility]    Patient documented not to have experienced any of the following events N/A 7/24/2014    Procedure: LUMBAR EPIDURAL;  Surgeon: Srini Benjamin MD;  [facility] [place] [facility]    Patient withough preoperative order for iv antibiotic surgical site infection prophylaxis.  8/30/2013    Procedure: LUMBAR EPIDURAL;  Surgeon: Srini Benjamin MD;  [facility] [place] [facility]    Patient withough preoperative order for iv antibiotic surgical site infection prophylaxis.  9/16/2013    Procedure: LUMBAR EPIDURAL;  Surgeon: Srini Benjamin MD;  [facility]    Patient withough preoperative order for iv antibiotic surgical site infection prophylaxis.  10/8/2013    Procedure: LUMBAR EPIDURAL;  Surgeon: Srini Benjamin MD;  [facility] [place] [facility]    Patient withough preoperative order for iv  antibiotic surgical site infection prophylaxis.  6/24/2014    Procedure: ;  Surgeon: Srini Benjamin MD;  Location: [facility]    Patient withough preoperative order for iv antibiotic surgical site infection prophylaxis. N/A 7/24/2014    Procedure: LUMBAR EPIDURAL;  Surgeon: Srini Benjamin MD;  Location: [facility]    Upper gi endoscopy performed  10/7/13   [3]   Family History  Problem Relation Age of Onset    Cancer Father         Esophagus (1964)    Diabetes Mother     Hypertension Sister     Obesity Sister     Other (gastric cancer) Sister     Other (malignant pancreatic neoplasm) Sister     Cancer Sister         Colon (2009)    Cancer Sister         Pancreas (2001)   [4]   Allergies  Allergen Reactions    Bee Venom SWELLING   [5]   Current Facility-Administered Medications:     sodium chloride 0.45% infusion, , Intravenous, Continuous    glucose (Dex4) 15 GM/59ML oral liquid 15 g, 15 g, Oral, Q15 Min PRN **OR** glucose (Glutose) 40% oral gel 15 g, 15 g, Oral, Q15 Min PRN **OR** glucose-vitamin C (Dex-4) chewable tab 4 tablet, 4 tablet, Oral, Q15 Min PRN **OR** dextrose 50% injection 50 mL, 50 mL, Intravenous, Q15 Min PRN **OR** glucose (Dex4) 15 GM/59ML oral liquid 30 g, 30 g, Oral, Q15 Min PRN **OR** glucose (Glutose) 40% oral gel 30 g, 30 g, Oral, Q15 Min PRN **OR** glucose-vitamin C (Dex-4) chewable tab 8 tablet, 8 tablet, Oral, Q15 Min PRN    insulin aspart (NovoLOG) 100 Units/mL FlexPen 1-10 Units, 1-10 Units, Subcutaneous, TID AC and HS    cefTRIAXone (Rocephin) 1 g in sodium chloride 0.9% 100 mL IVPB-ADDV, 1 g, Intravenous, Q24H    heparin (Porcine) 5000 UNIT/ML injection 5,000 Units, 5,000 Units, Subcutaneous, 2 times per day    acetaminophen (Tylenol Extra Strength) tab 500 mg, 500 mg, Oral, Q4H PRN    polyethylene glycol (PEG 3350) (Miralax) 17 g oral packet 17 g, 17 g, Oral, Daily PRN    sennosides (Senokot) tab 17.2 mg, 17.2 mg, Oral, Nightly PRN    bisacodyl  (Dulcolax) 10 MG rectal suppository 10 mg, 10 mg, Rectal, Daily PRN    ondansetron (Zofran) 4 MG/2ML injection 4 mg, 4 mg, Intravenous, Q6H PRN    metoclopramide (Reglan) 5 mg/mL injection 10 mg, 10 mg, Intravenous, Q8H PRN  [6]   No current outpatient medications on file.

## 2025-04-14 NOTE — DIETARY NOTE
[facility]   part of [facility]    NUTRITION ASSESSMENT    Unable to diagnose malnutrition criteria at this time.    NUTRITION INTERVENTION:    Meal and Snacks - Continue soft/easy to chew diet with thin liquids Diet per SLP. Diet as tolerated; monitor patient po intake. Encourage adequate po of appropriate diet.  Medical Food Supplements - RD added Nepro Daily. Rationale/use for oral supplements discussed.      PATIENT STATUS:       04/14/25  Interview done in Estonian, qualified bilingual staff member as of 7/18/2024 on a passing score of a Language Line proficiency test.    89 yo female. Presented with AMS, family notes progressive decline over 1 week. Decreased PO intake. Admitted on 4/13 with JOSE ANTONIO and hypernatremia.  MST triggered to be seen. Visited patient in room, daughter at bedside and providing most information. Patient mostly with eyes closed.  Patient has not been eating much over the past week, daughter was offering Ensure to supplement intake. Daughter and patient agreeable to ONS during admission, Nepro indicated with current labs.  Per daughter before current symptoms, patient was having 3 meals per day with good PO intake and has maintained weight 131lbs  +-5 lbs. Weight was taken at PCP last week - 126lbs.   SLP following.    Pmhx - Includes dementia, DM, HTN, CKD, HLD    ANTHROPOMETRICS:  Ht: 144.8 cm (4' 9\")  Wt: 57.2 kg (126 lb).   BMI: Body mass index is 27.27 kg/m².  IBW: 45.5 kg      WEIGHT HISTORY:   Weight loss: No    Wt Readings from Last 10 Encounters:   04/13/25 57.2 kg (126 lb)   03/29/23 61.2 kg (135 lb)   07/05/22 61.3 kg (135 lb 2 oz)   11/03/21 59.9 kg (132 lb)   11/02/21 60.3 kg (133 lb)   07/08/21 58.1 kg (128 lb)   04/22/21 57.6 kg (127 lb)   04/15/21 56.7 kg (125 lb)   03/19/21 59 kg (130 lb)   02/17/21 59 kg (130 lb)        NUTRITION:  Diet:       Procedures    Regular/General diet Texture Consistency: Soft / Easy to Chew ; Is Patient on Accuchecks? No      Food  Allergies: No  Cultural/Ethnic/Confucianist Preferences Addressed: Yes    Percent Meals Eaten (last 3 days)       None            GI system review: constipation and decreased appetite  Last BM Date: 04/09/25  Skin and wounds: pressure injury on sacrum documented by RN    NUTRITION RELATED PHYSICAL FINDINGS:     1. Body Fat/Muscle Mass: no wasting noted      2. Fluid Accumulation: none    NUTRITION PRESCRIPTION:   57.2 kg Actual Body Weight  Calories: 1258 - 1430 calories/day (22-25 kcal/kg)  Protein: 63 - 69 grams protein/day ( 1.1 - 1.2  grams protein per kg)  Fluid: ~1 ml/kcal or per MD discretion    NUTRITION DIAGNOSIS/PROBLEM:  Inadequate oral intake related to insufficient appetite resulting in inadequate nutrition intake as evidenced by documented/reported insufficient oral intake      MONITOR AND EVALUATE/NUTRITION GOALS:  PO intake of 75% of meals TID - New  PO intake of 75% of oral nutrition supplement/s - New  Weight stable within 1 to 2 lbs during admission - New      MEDICATIONS:  Novolog ssi, PRN Miralax  Gtt: NaCL 0.45 @ 200 mL/hr    LABS:    Recent Labs   Lab 04/13/25  1845 04/14/25  0630   * 167*   * 157*   K 5.4* 5.2*   * 123*   CO2 22.0 22.0   * 122*   CREATSERUM 5.32* 5.35*   CA 10.7* 10.1   MG  --  3.2*   ALKPHO 76  --    AST 47*  --    ALT 25  --    BILT 0.4  --    TRIG 199*  --          Pt is at Moderate nutrition risk    Sarahi García RDN, LDN, [facility]  Clinical Dietitian   [id number]

## 2025-04-14 NOTE — ED PROVIDER NOTES
Patient Seen in: [facility] Emergency Department    History     Chief Complaint   Patient presents with    Altered Mental Status     Stated Complaint:     Subjective:   HPI      89-year-old female brought in by family for evaluation of 1 week of progressive \"decline\".  Patient states that family is normally ambulatory and active.  Over the past week they have noticed decreasing activity, not getting out of bed, decreased psychomotor, excessive sleeping, fatigue, generalized weakness, anorexia.  Family reports unwitnessed fall last week, they did not seek any medical evaluation, she has been complaining of fatigue and right upper arm pain since then.    Objective:     Past Medical History:    Anemia    Arthritis    Back problem    herniated discs L4-L5    Blood disorder    anemia    Cellulitis, leg    Dementia (HCC)    Depression    Elevated blood pressure reading without diagnosis of hypertension    Esophageal reflux    Gastritis    GENITO-URINARY DISEASE    Glaucoma    HIGH BLOOD PRESSURE    HIGH CHOLESTEROL    Kidney disease    Lumbar degenerative disc disease    Lumbar radiculitis    Lumbar spondylosis    MADHU (obstructive sleep apnea)    AHI 5 RDI 5 REM AHI 0 Supine AHI 2 non-supine AHI 6     MADHU (obstructive sleep apnea)    Osteoarthrosis, unspecified whether generalized or localized, unspecified site    Other and unspecified hyperlipidemia    Pain in limb    Personal history of urinary (tract) infection    below th eknee    Renal disorder    elevated BUN and creatinine    Shortness of breath    uses oxygen 2.5 L/NC every night for hx of low saturation identified during sleep study 2014    Spinal stenosis    Type II or unspecified type diabetes mellitus without mention of complication, not stated as uncontrolled    Unspecified acute reaction to stress    Unspecified essential hypertension    Visual impairment    glasses              Past Surgical History:   Procedure Laterality Date    Angiogram  FEB  2013    Angiogram  Feb 2013    Done in [facility]    Appendectomy  1949    Appendectomy      Cataract surgery, complex  08/2010    Cath percutaneous  transluminal coronary angioplasty  2/2012    Normal in [place]    Colonoscopy N/A 11/15/2016    Procedure: COLONOSCOPY;  Surgeon: Levar Callaway MD;  Location:  ENDOSCOPY    Colonoscopy & polypectomy  11/15/16= Polyp (serrated)    Repeat PRN    Fluor gid & loclzj ndl/cath spi dx/ther njx  8/30/2013    Procedure: LUMBAR EPIDURAL;  Surgeon: Srini Benjamin MD;  Location: [facility]    Fluor gid & loclzj ndl/cath spi dx/ther njx  9/16/2013    Procedure: LUMBAR EPIDURAL;  Surgeon: Srini Benjamin MD;  Location: [facility]    Fluor gid & loclzj ndl/cath spi dx/ther njx  10/8/2013    Procedure: LUMBAR EPIDURAL;  Surgeon: Srini Benjamin MD;  Location: [facility]    Fluor gid & loclzj ndl/cath spi dx/ther njx  6/24/2014    Procedure: ;  Surgeon: Srini Benjamin MD;  Location: [facility]    Fluor gid & loclzj ndl/cath spi dx/ther njx N/A 7/24/2014    Procedure: LUMBAR EPIDURAL;  Surgeon: Srini Benjamin MD;  Location: [facility]    Hysterectomy      Hysterectomy  1974    Injection, w/wo contrast, dx/therapeutic substance, epidural/subarachnoid; lumbar/sacral  8/30/2013    Procedure: LUMBAR EPIDURAL;  Surgeon: Srini Benjamin MD;  Location: [facility]    Injection, w/wo contrast, dx/therapeutic substance, epidural/subarachnoid; lumbar/sacral  9/16/2013    Procedure: LUMBAR EPIDURAL;  Surgeon: Srini Benjamin MD;  Location: [facility]    Injection, w/wo contrast, dx/therapeutic substance, epidural/subarachnoid; lumbar/sacral  10/8/2013    Procedure: LUMBAR EPIDURAL;  Surgeon: Srini Benjamin MD;  Location: [facility]    Injection, w/wo contrast, dx/therapeutic substance, epidural/subarachnoid; lumbar/sacral N/A 6/24/2014     Procedure: LUMBAR EPIDURAL;  Surgeon: Srini Benjamin MD;  Location: [facility]    Injection, w/wo contrast, dx/therapeutic substance, epidural/subarachnoid; lumbar/sacral N/A 7/24/2014    Procedure: LUMBAR EPIDURAL;  Surgeon: rSini Benjamin MD;  Location: [facility]    M-sedaj by  phys perfrmg svc 5+ yr  8/30/2013    Procedure: LUMBAR EPIDURAL;  Surgeon: Srini Benjamin MD;  Location: [facility]    M-sedaj by  phys perfrmg svc 5+ yr  9/16/2013    Procedure: LUMBAR EPIDURAL;  Surgeon: Srini Benjamin MD;  Location: [facility]    M-sedaj by  phys perfrmg svc 5+ yr  10/8/2013    Procedure: LUMBAR EPIDURAL;  Surgeon: Srini Benjamin MD;  Location: [facility]    M-sedaj by  phys perfrmg svc 5+ yr  6/24/2014    Procedure: ;  Surgeon: Srini Benjamin MD;  Location: [facility]    M-sedaj by  phys perfrmg svc 5+ yr N/A 7/24/2014    Procedure: LUMBAR EPIDURAL;  Surgeon: Srini Benjamin MD;  Location: [facility]    Patient documented not to have experienced any of the following events  8/30/2013    Procedure: LUMBAR EPIDURAL;  Surgeon: Srini Benjamin MD;  Location: [facility]    Patient documented not to have experienced any of the following events  9/16/2013    Procedure: LUMBAR EPIDURAL;  Surgeon: Srini Benjamin MD;  Location: [facility]    Patient documented not to have experienced any of the following events  10/8/2013    Procedure: LUMBAR EPIDURAL;  Surgeon: Srini Benjamin MD;  Location: [facility]    Patient documented not to have experienced any of the following events  6/24/2014    Procedure: ;  Surgeon: Srini Benjamin MD;  Location: [facility]    Patient documented not to have experienced any of the following events N/A 7/24/2014    Procedure: LUMBAR EPIDURAL;  Surgeon: Srini Benjamin  MD;  Location: [facility]    Patient withough preoperative order for iv antibiotic surgical site infection prophylaxis.  8/30/2013    Procedure: LUMBAR EPIDURAL;  Surgeon: Srini Benjamin MD;  Location: [facility]    Patient withough preoperative order for iv antibiotic surgical site infection prophylaxis.  9/16/2013    Procedure: LUMBAR EPIDURAL;  Surgeon: Srini Benjamin MD;  Location: [facility]    Patient withough preoperative order for iv antibiotic surgical site infection prophylaxis.  10/8/2013    Procedure: LUMBAR EPIDURAL;  Surgeon: Srini Benjamin MD;  Location: [facility]    Patient withough preoperative order for iv antibiotic surgical site infection prophylaxis.  6/24/2014    Procedure: ;  Surgeon: Srini Benjamin MD;  Location: [facility]    Patient withough preoperative order for iv antibiotic surgical site infection prophylaxis. N/A 7/24/2014    Procedure: LUMBAR EPIDURAL;  Surgeon: Srini Benjamin MD;  Location: [facility]    Upper gi endoscopy performed  10/7/13                Social History     Socioeconomic History    Marital status:    Tobacco Use    Smoking status: Never    Smokeless tobacco: Never   Vaping Use    Vaping status: Never Used   Substance and Sexual Activity    Alcohol use: Yes     Comment: rarely    Drug use: No   Other Topics Concern    Caffeine Concern Yes     Comment: decaf coffee but 2 cups regular tea    Exercise No    Seat Belt Yes   Social History Narrative    ** Merged History Encounter **                       Physical Exam     ED Triage Vitals   BP 04/13/25 1845 106/44   Pulse 04/13/25 1845 90   Resp 04/13/25 1845 20   Temp 04/13/25 1859 97.5 °F (36.4 °C)   Temp src 04/13/25 1859 Temporal   SpO2 04/13/25 1845 100 %   O2 Device 04/13/25 1845 Nasal cannula       Current Vitals:   Vital Signs  BP: 107/57  Pulse: 73  Resp: 12  Temp: 97.5 °F (36.4  °C)  Temp src: Temporal  MAP (mmHg): 73    Oxygen Therapy  SpO2: 100 %  O2 Device: Nasal cannula  O2 Flow Rate (L/min): 2 L/min        Physical Exam  Vitals and nursing note reviewed.   Constitutional:       General: She is not in acute distress.     Appearance: She is well-developed. She is ill-appearing.   HENT:      Head: Normocephalic and atraumatic.      Mouth/Throat:      Mouth: Mucous membranes are dry.      Comments: Extremely dry mucous membranes  Eyes:      Extraocular Movements: Extraocular movements intact.      Pupils: Pupils are equal, round, and reactive to light.   Cardiovascular:      Rate and Rhythm: Normal rate and regular rhythm.      Pulses: Normal pulses.      Heart sounds: Normal heart sounds.   Pulmonary:      Effort: Pulmonary effort is normal. No respiratory distress.      Breath sounds: Normal breath sounds.   Abdominal:      General: Abdomen is flat. There is no distension.      Palpations: Abdomen is soft.      Tenderness: There is no abdominal tenderness.   Musculoskeletal:      Cervical back: Neck supple.   Skin:     General: Skin is dry.   Neurological:      Mental Status: She is alert.      Comments: Decreased psychomotor, drowsy, arousable to voice and answering questions, she is only oriented to person, moving all extremities   Psychiatric:      Comments: Decreased psychomotor             ED Course     Labs Reviewed   URINALYSIS WITH CULTURE REFLEX - Abnormal; Notable for the following components:       Result Value    Clarity Urine Ex.Turbid (*)     Blood Urine 1+ (*)     Protein Urine 70 (*)     Leukocyte Esterase Urine 500 (*)     WBC Urine >50 (*)     RBC Urine >10 (*)     Bacteria Urine 3+ (*)     All other components within normal limits   CBC WITH DIFFERENTIAL WITH PLATELET - Abnormal; Notable for the following components:    WBC 11.2 (*)     RBC 3.63 (*)     HGB 10.3 (*)     HCT 34.8 (*)     MCHC 29.6 (*)     Neutrophil Absolute Prelim 8.11 (*)     Neutrophil Absolute 8.11  (*)     All other components within normal limits   COMP METABOLIC PANEL (14) - Abnormal; Notable for the following components:    Glucose 277 (*)     Sodium 158 (*)     Potassium 5.4 (*)     Chloride 122 (*)      (*)     Creatinine 5.32 (*)     Calcium, Total 10.7 (*)     Calculated Osmolality 376 (*)     eGFR-Cr 7 (*)     AST 47 (*)     All other components within normal limits   LIPASE - Abnormal; Notable for the following components:    Lipase 82 (*)     All other components within normal limits   TROPONIN I HIGH SENSITIVITY - Abnormal; Notable for the following components:    Troponin I (High Sensitivity) 158 (*)     All other components within normal limits   LIPID PANEL - Abnormal; Notable for the following components:    Cholesterol, Total 200 (*)     Triglycerides 199 (*)     LDL Cholesterol 113 (*)     VLDL 35 (*)     Non HDL Chol 148 (*)     All other components within normal limits   POCT GLUCOSE - Abnormal; Notable for the following components:    POC Glucose 252 (*)     All other components within normal limits   RAINBOW DRAW LAVENDER   RAINBOW DRAW LIGHT GREEN   RAINBOW DRAW BLUE   RAINBOW DRAW GOLD   URINE CULTURE, ROUTINE            ED Course as of 04/13/25 2126  ------------------------------------------------------------  Time: 04/13 2050  Comment: RN placed straight cath in, drained 150 mL of urine.  No signs of urinary Obstruction  ------------------------------------------------------------  Time: 04/13 2050  Comment: I independently interpreted labs, urinalysis consistent with UTI.  Labs also concerning for acute on chronic renal insufficiency with severe hyponatremia, estimated free water deficit 3.3 L  ------------------------------------------------------------  Time: 04/13 2058  Comment: I  independently interpreted CT head, atrophy noted, no intracranial hemorrhage, additional details per radiology.  ------------------------------------------------------------  Time: 04/13  2102  Comment: Discussed case with nephrology, recommends starting half-normal saline at 125 an hour  ------------------------------------------------------------  Time: 04/13 2117  Comment: EKG INTERPRETATION  Time: 18:39  Normal sinus rhythm, ventricular rate is 94, left axis deviation, Q waves in leads III and aVF, poor R wave progression, left axis deviation  Normal KS, QRS, and QTc intervals  No chamber enlargement  Normal ST-T segments  No prior EKG's available for comparison.  I, the emergency physician, independently interpreted this EKG in the absence of a cardiologist.         XR SHOULDER, COMPLETE (MIN 2 VIEWS), RIGHT (CPT=73030)  Result Date: 4/13/2025  CONCLUSION:  There are degenerative changes and there are findings of calcific tendinopathy.  There is no acute fracture detected.   LOCATION:  [facility]   Dictated by ([facility]): Jamshid Silvreio MD on 4/13/2025 at 9:20 PM     Finalized by ([facility]): Jamhsid Silverio MD on 4/13/2025 at 9:21 PM       CT SPINE CERVICAL (CPT=72125)  Result Date: 4/13/2025  CONCLUSION:  There is multilevel degenerative disc disease in the cervical spine which is described in detail level by level above.  There is suggestion of severe stenosis of the central canal at C4-C5.  There is no acute fracture detected.    LOCATION:  [facility]   Dictated by ([facility]): Jamshid Silverio MD on 4/13/2025 at 8:47 PM     Finalized by ([facility]): Jamshid Silverio MD on 4/13/2025 at 8:50 PM       CT BRAIN OR HEAD (CPT=70450)  Result Date: 4/13/2025  CONCLUSION:  1. There is chronic small vessel ischemic change and atrophy noted. 2. There is no acute abnormality detected on the noncontrast CT of the head.     LOCATION:  [facility]   Dictated by ([facility]): Jamshid Silverio MD on 4/13/2025 at 8:44 PM     Finalized by ([facility]): Jamshid Silverio MD on 4/13/2025 at 8:47 PM                            MDM      Differential diagnosis includes UTI, pyelonephritis, renal sufficiency, electrolyte abnormality, dehydration, pneumonia, subdural hematoma,  delirium, metabolic encephalopathy    Admission disposition: 4/13/2025  9:24 PM           Medical Decision Making  This patient is 89 years old and has history of dementia, brought in by family for decreased psychomotor, generalized weakness, poor p.o. intake  Workup consistent with severe dehydration including severe hypernatremia, acute on chronic renal insufficiency, appreciate nephrology recommendations for IV fluids  she has UTI which is likely contributing to patient's deconditioning, IV antibiotics ordered, Hematuria noted on urinalysis so we will check CT to rule out kidney stone  Plan for admission for IV antibiotics, IV fluids, and rehydration      Amount and/or Complexity of Data Reviewed  Labs: ordered. Decision-making details documented in ED Course.  Radiology: ordered and independent interpretation performed. Decision-making details documented in ED Course.  ECG/medicine tests: ordered and independent interpretation performed. Decision-making details documented in ED Course.    Risk  Decision regarding hospitalization.        Disposition and Plan     Clinical Impression:  1. Hypernatremia    2. Acute on chronic renal insufficiency    3. Type 2 MI (myocardial infarction) (HCC)    4. Urinary tract infection with hematuria, site unspecified    5. Dehydration, severe         Disposition:  Admit  4/13/2025  9:24 pm    Follow-up:  No follow-up provider specified.        Medications Prescribed:  Current Discharge Medication List          Supplementary Documentation:         Hospital Problems       Present on Admission  Date Reviewed: 4/7/2025          ICD-10-CM Noted POA    * (Principal) Hypernatremia E87.0 4/13/2025 Unknown

## 2025-04-15 LAB
ANION GAP SERPL CALC-SCNC: 11 MMOL/L (ref 0–18)
BASOPHILS # BLD AUTO: 0.02 X10(3) UL (ref 0–0.2)
BASOPHILS NFR BLD AUTO: 0.3 %
BUN BLD-MCNC: 108 MG/DL (ref 9–23)
CALCIUM BLD-MCNC: 8.4 MG/DL (ref 8.7–10.6)
CHLORIDE SERPL-SCNC: 115 MMOL/L (ref 98–112)
CO2 SERPL-SCNC: 20 MMOL/L (ref 21–32)
CREAT BLD-MCNC: 5.05 MG/DL (ref 0.55–1.02)
EGFRCR SERPLBLD CKD-EPI 2021: 7 ML/MIN/1.73M2 (ref 60–?)
EOSINOPHIL # BLD AUTO: 0.12 X10(3) UL (ref 0–0.7)
EOSINOPHIL NFR BLD AUTO: 1.8 %
ERYTHROCYTE [DISTWIDTH] IN BLOOD BY AUTOMATED COUNT: 13.2 %
GLUCOSE BLD-MCNC: 106 MG/DL (ref 70–99)
GLUCOSE BLD-MCNC: 133 MG/DL (ref 70–99)
GLUCOSE BLD-MCNC: 140 MG/DL (ref 70–99)
GLUCOSE BLD-MCNC: 144 MG/DL (ref 70–99)
GLUCOSE BLD-MCNC: 187 MG/DL (ref 70–99)
GLUCOSE BLD-MCNC: 68 MG/DL (ref 70–99)
GLUCOSE BLD-MCNC: 69 MG/DL (ref 70–99)
GLUCOSE BLD-MCNC: 79 MG/DL (ref 70–99)
HCT VFR BLD AUTO: 27.6 % (ref 35–48)
HGB BLD-MCNC: 8 G/DL (ref 12–16)
IMM GRANULOCYTES # BLD AUTO: 0.06 X10(3) UL (ref 0–1)
IMM GRANULOCYTES NFR BLD: 0.9 %
LYMPHOCYTES # BLD AUTO: 1.73 X10(3) UL (ref 1–4)
LYMPHOCYTES NFR BLD AUTO: 26.5 %
MAGNESIUM SERPL-MCNC: 2.8 MG/DL (ref 1.6–2.6)
MCH RBC QN AUTO: 28.1 PG (ref 26–34)
MCHC RBC AUTO-ENTMCNC: 29 G/DL (ref 31–37)
MCV RBC AUTO: 96.8 FL (ref 80–100)
MONOCYTES # BLD AUTO: 0.37 X10(3) UL (ref 0.1–1)
MONOCYTES NFR BLD AUTO: 5.7 %
NEUTROPHILS # BLD AUTO: 4.22 X10 (3) UL (ref 1.5–7.7)
NEUTROPHILS # BLD AUTO: 4.22 X10(3) UL (ref 1.5–7.7)
NEUTROPHILS NFR BLD AUTO: 64.8 %
OSMOLALITY SERPL CALC.SUM OF ELEC: 334 MOSM/KG (ref 275–295)
PLATELET # BLD AUTO: 112 10(3)UL (ref 150–450)
POTASSIUM SERPL-SCNC: 4.7 MMOL/L (ref 3.5–5.1)
RBC # BLD AUTO: 2.85 X10(6)UL (ref 3.8–5.3)
SODIUM SERPL-SCNC: 146 MMOL/L (ref 136–145)
WBC # BLD AUTO: 6.5 X10(3) UL (ref 4–11)

## 2025-04-15 PROCEDURE — 99233 SBSQ HOSP IP/OBS HIGH 50: CPT | Performed by: INTERNAL MEDICINE

## 2025-04-15 NOTE — PLAN OF CARE
Assumed care of pt at 0700.  Pt drowsy, arousable to verbal stimuli.  Na 157 today.  IVF at 100cc/hr, increased to 200cc/hr per nephro.  Urine specimens ordered.  Pt voided using bedside commode, 80ml noted.  Post void residual 19ml. Urine sent.  Pt up with physical therapy during day shift.  Sat up in recliner, tolerated activity well.  Max assist x 2 with gait belt  and use of walker with transfers.  Daughter, Rubi visiting during day shift.  Assists with care.  Updated pt's daughter re: plan of care.  Verbalizes understanding.  Needs addressed.  No signs of discomfort noted.  Meds per MAR.  Safety measures in place.    Problem: METABOLIC/FLUID AND ELECTROLYTES - ADULT  Goal: Electrolytes maintained within normal limits  Description: INTERVENTIONS:- Monitor labs and rhythm and assess patient for signs and symptoms of electrolyte imbalances- Administer electrolyte replacement as ordered- Monitor response to electrolyte replacements, including rhythm and repeat lab results as appropriate- Fluid restriction as ordered- Instruct patient on fluid and nutrition restrictions as appropriate  Outcome: Not Progressing     Problem: SAFETY ADULT - FALL  Goal: Free from fall injury  Description: INTERVENTIONS:- Assess pt frequently for physical needs- Identify cognitive and physical deficits and behaviors that affect risk of falls.- [place] fall precautions as indicated by assessment.- Educate pt/family on patient safety including physical limitations- Instruct pt to call for assistance with activity based on assessment- Modify environment to reduce risk of injury- Provide assistive devices as appropriate- Consider OT/PT consult to assist with strengthening/mobility- Encourage toileting schedule  Outcome: Progressing

## 2025-04-15 NOTE — PLAN OF CARE
Problem: METABOLIC/FLUID AND ELECTROLYTES - ADULT  Goal: Electrolytes maintained within normal limits  Description: INTERVENTIONS:- Monitor labs and rhythm and assess patient for signs and symptoms of electrolyte imbalances- Administer electrolyte replacement as ordered- Monitor response to electrolyte replacements, including rhythm and repeat lab results as appropriate- Fluid restriction as ordered- Instruct patient on fluid and nutrition restrictions as appropriate  Outcome: Progressing  Goal: Glucose maintained within prescribed range  Description: INTERVENTIONS:- Monitor Blood Glucose as ordered- Assess for signs and symptoms of hyperglycemia and hypoglycemia- Administer ordered medications to maintain glucose within target range- Assess barriers to adequate nutritional intake and initiate nutrition consult as needed- Instruct patient on self management of diabetes  Outcome: Progressing     Problem: GASTROINTESTINAL - ADULT  Goal: Maintains or returns to baseline bowel function  Description: INTERVENTIONS:- Assess bowel function- Maintain adequate hydration with IV or PO as ordered and tolerated- Evaluate effectiveness of GI medications- Encourage mobilization and activity- Obtain nutritional consult as needed- Establish a toileting routine/schedule- Consider collaborating with pharmacy to review patient's medication profile  Outcome: Progressing  Goal: Achieves appropriate nutritional intake (bariatric)  Description: INTERVENTIONS:- Monitor for over-consumption- Identify factors contributing to increased intake, treat as appropriate- Monitor I&O, WT and lab values- Obtain nutritional consult as needed- Evaluate psychosocial factors contributing to over-consumption  Outcome: Progressing     Problem: SKIN/TISSUE INTEGRITY - ADULT  Goal: Skin integrity remains intact  Description: INTERVENTIONS- Assess and document risk factors for pressure ulcer development- Assess and document skin integrity- Monitor for areas  of redness and/or skin breakdown- Initiate interventions, skin care algorithm/standards of care as needed  Outcome: Progressing     Problem: SAFETY ADULT - FALL  Goal: Free from fall injury  Description: INTERVENTIONS:- Assess pt frequently for physical needs- Identify cognitive and physical deficits and behaviors that affect risk of falls.- [place] fall precautions as indicated by assessment.- Educate pt/family on patient safety including physical limitations- Instruct pt to call for assistance with activity based on assessment- Modify environment to reduce risk of injury- Provide assistive devices as appropriate- Consider OT/PT consult to assist with strengthening/mobility- Encourage toileting schedule  Outcome: Progressing   Patient drowsy, opens eyes, responds to commands, although encouragement is needed, pt weak. Hypoglycemia protocol followed, pt w very minimal PO intake, 1:1 feed in place. Patient's daughter at bedside, updated and in agreement with POC. Dry flaking skin to R heel, bilateral heels elevated/off loaded. Fall and safety precautions in place. Patient's daughter updated and in agreement with POC. Patient with small BM, per daughter patient's last BM was approx 6 days ago. Suppository given, awaiting results. Skin to sacrum under mepilex examined, skin darker in color, reddened with areas of white. Mepilex in place, waffle cushion in place, wound care consulted. Patient w minimal urine output during shift, bladder scan at 310ml.

## 2025-04-15 NOTE — SLP NOTE
SPEECH DAILY NOTE - INPATIENT    ASSESSMENT & PLAN   ASSESSMENT  Pt seen for dysphagia tx to assess tolerance with recommended diet, ensure proper utilization of aspiration precautions and provide pt/family education.  Patient drowsy in bed with daughter present at bedside providing assistance with breakfast. Patient's daughter reported good tolerance of PO intake, but continued lethargy limiting PO intake. Patient's dentures are now available,b ut she did not wish to place them. No overt s/s of aspiration observed, but patient with difficulty remaining alert. Education provided re: aspiration precautions and encouraged daughter not to pus PO intake if patient unable to actively participate. Recommend patient continue current diet as level of alertness allows. SLP will continue to follow per POC.     Diet Recommendations - Solids: Soft/ Easy to chew  Diet Recommendations - Liquids: Thin Liquids       Aspiration Precautions: Upright position, Small bites, Small sips  Medication Administration Recommendations: One pill at a time    Patient Experiencing Pain: No                Treatment Plan  Treatment Plan/Recommendations: Aspiration precautions    Interdisciplinary Communication: NA            GOALS  Goal #1 The patient will tolerate soft consistency and thin liquids without overt signs or symptoms of aspiration with 90 % accuracy over 1-2 session(s).  In Progress   Goal #2 The patient/family/caregiver will demonstrate understanding and implementation of aspiration precautions and swallow strategies independently over 1-2 session(s).     In Progress   Goal #3       Goal #4       Goal #5       Goal #6       Goal #7       Goal #8            FOLLOW UP  Follow Up Needed (Documentation Required): Yes  SLP Follow-up Date: 04/16/25  Duration: 1 week    Session: 1    If you have any questions, please contact JANETT Leonardo

## 2025-04-15 NOTE — CM/SW NOTE
04/15/25 1100   CM/SW Referral Data   Referral Source    Reason for Referral Discharge planning   Informant Patient;Daughter     Patient is a 89 year old female who was admitted for weakness and hyperNa.     Met with pt and dtr at bedside to discuss discharge needs. Pt was sleeping throughout visit. Discussed PT recs for [facility]. Dtr stating \"she would not survive that\". Dtr wants pt to return to home with family. Discussed HH as option, will send referral. Pt has chair lift to second floor and has a bathroom next to her bedroom. Pt was in good health up until this last week. Daughter stating pt had 89th birthday party with 60 people, pt was eating and enjoying all the company.     Will need to follow up with HH choice.     Jigar Chamorro, VIVIANA RN, CM  X [phone]

## 2025-04-15 NOTE — PLAN OF CARE
Patient received drowsy. Opens eyes to speech. Delayed responses. Oriented x1-2. VSS. Afebrile. No c/o pain at this time. No BG coverage provided per parameters. IVF infusing. See MAR. Safety precautions continued. Call light within reach.   0610: No UOP overnight. Bladder scan - 250 mL  0610: Received call from lab reporting BG of 68. Fingerstick - 69. Protocol followed. MD notified, no new orders received.  Problem: METABOLIC/FLUID AND ELECTROLYTES - ADULT  Goal: Electrolytes maintained within normal limits  Description: INTERVENTIONS:- Monitor labs and rhythm and assess patient for signs and symptoms of electrolyte imbalances- Administer electrolyte replacement as ordered- Monitor response to electrolyte replacements, including rhythm and repeat lab results as appropriate- Fluid restriction as ordered- Instruct patient on fluid and nutrition restrictions as appropriate  Outcome: Progressing     Problem: SAFETY ADULT - FALL  Goal: Free from fall injury  Description: INTERVENTIONS:- Assess pt frequently for physical needs- Identify cognitive and physical deficits and behaviors that affect risk of falls.- [place] fall precautions as indicated by assessment.- Educate pt/family on patient safety including physical limitations- Instruct pt to call for assistance with activity based on assessment- Modify environment to reduce risk of injury- Provide assistive devices as appropriate- Consider OT/PT consult to assist with strengthening/mobility- Encourage toileting schedule  Outcome: Progressing

## 2025-04-15 NOTE — PROGRESS NOTES
[facility]  Nephrology Progress Note    Ann Herndon Patient Status:  Inpatient    1925 MRN [id number]   [facility] 4NW-A Attending Manuel Chan MD   Hosp Day # 2 PCP Cory Sanchez,      Subjective:  Drowsy this morning. Daughter at bedside.     Objective:  Vital signs: Blood pressure 106/37, pulse 54, temperature 97.4 °F (36.3 °C), temperature source Axillary, resp. rate 16, height 4' 9\" (1.448 m), weight 126 lb (57.2 kg), SpO2 93%, not currently breastfeeding.  General: No acute distress.Lethargic  HEENT: Moist mucous membranes.   Respiratory: Clear to auscultation bilaterally.  Cardiovascular: S1, S2.  Regular rate and rhythm.   Abdomen: Soft, nontender  Neurologic: lethargic  Musculoskeletal:  No swelling noted.    Current Hospital Medications[1]    Recent Labs   Lab 25  0630 04/15/25  0610   WBC 11.2* 9.4 6.5   HGB 10.3* 8.9* 8.0*   MCV 95.9 97.1 96.8   .0 128.0* 112.0*       Recent Labs   Lab 25  1845 25  0630 04/15/25  0610   * 157* 146*   K 5.4* 5.2* 4.7   * 123* 115*   CO2 22.0 22.0 20.0*   * 122* 108*   CREATSERUM 5.32* 5.35* 5.05*   CA 10.7* 10.1 8.4*   MG  --  3.2* 2.8*   * 167* 68*       Recent Labs   Lab 25  1845   ALT 25   AST 47*   ALB 4.1       Recent Labs   Lab 25  2112 04/15/25  0553 04/15/25  0705 04/15/25  0736 04/15/25  0955   PGLU 187* 79 69* 187* 144*         Assessment/Plan:  1) Acute kidney injury on CKD4: Has long-standing chronic kidney disease stage 4 with baseline serum creatinine of 2.4-2.9 mg/dL in setting of long-standing HTN, diabetic nephropathy and advanced age. Now presenting with acute kidney injury and hypernatremia in setting of worsening mental status and poor PO intake with concomitant diuretic use. JOSE ANTONIO likely 2/2 pre-renal azotemia, improving with IVF. Will continue IVF. Continue to follow.       2) Hypernatremia: Due to decreased free water intake d/t  worsening mental status. Improving with hypotonic fluids.      3) HTN: controlled currently, holding home losartan, hydralazine and torsemide.      4) Hyperkalemia: Due to JOSE ANTONIO and decreased distal sodium delivery. Improved. Continue IVF.     Discussed with daughter at bedside.    Thank you for allowing me to participate in this patient's care. Please feel free to call me with any questions or concerns.     Kirsty Jimenez MD  04/15/25       [1]   Current Facility-Administered Medications   Medication Dose Route Frequency    sodium chloride 0.45% infusion   Intravenous Continuous    glucose (Dex4) 15 GM/59ML oral liquid 15 g  15 g Oral Q15 Min PRN    Or    glucose (Glutose) 40% oral gel 15 g  15 g Oral Q15 Min PRN    Or    glucose-vitamin C (Dex-4) chewable tab 4 tablet  4 tablet Oral Q15 Min PRN    Or    dextrose 50% injection 50 mL  50 mL Intravenous Q15 Min PRN    Or    glucose (Dex4) 15 GM/59ML oral liquid 30 g  30 g Oral Q15 Min PRN    Or    glucose (Glutose) 40% oral gel 30 g  30 g Oral Q15 Min PRN    Or    glucose-vitamin C (Dex-4) chewable tab 8 tablet  8 tablet Oral Q15 Min PRN    insulin aspart (NovoLOG) 100 Units/mL FlexPen 1-10 Units  1-10 Units Subcutaneous TID AC and HS    cefTRIAXone (Rocephin) 1 g in sodium chloride 0.9% 100 mL IVPB-ADDV  1 g Intravenous Q24H    heparin (Porcine) 5000 UNIT/ML injection 5,000 Units  5,000 Units Subcutaneous 2 times per day    acetaminophen (Tylenol Extra Strength) tab 500 mg  500 mg Oral Q4H PRN    polyethylene glycol (PEG 3350) (Miralax) 17 g oral packet 17 g  17 g Oral Daily PRN    sennosides (Senokot) tab 17.2 mg  17.2 mg Oral Nightly PRN    bisacodyl (Dulcolax) 10 MG rectal suppository 10 mg  10 mg Rectal Daily PRN    ondansetron (Zofran) 4 MG/2ML injection 4 mg  4 mg Intravenous Q6H PRN    metoclopramide (Reglan) 5 mg/mL injection 10 mg  10 mg Intravenous Q8H PRN

## 2025-04-15 NOTE — PROGRESS NOTES
[facility] Hospitalist Progress Note     PCP: Cory Sanchez DO    Chief Complaint: follow-up   Follow up for: The primary encounter diagnosis was Hypernatremia. Diagnoses of Acute on chronic renal insufficiency, Type 2 MI (myocardial infarction) (HCC), Urinary tract infection with hematuria, site unspecified, and Dehydration, severe were also pertinent to this visit.    Overnight/Interim Events:  Low BG in aM    SUBJECTIVE:  Sitting in bed eating. On 2L. Slept earlier, more awake per dtr. Taking PO. Hasn't been up     OBJECTIVE:  Temp:  [97.4 °F (36.3 °C)-97.9 °F (36.6 °C)] 97.9 °F (36.6 °C)  Pulse:  [52-64] 52  Resp:  [16-17] 16  BP: (101-125)/(36-83) 119/83  SpO2:  [92 %-100 %] 100 %    Intake/Output:    Intake/Output Summary (Last 24 hours) at 4/15/2025 1549  Last data filed at 4/15/2025 0553  Gross per 24 hour   Intake 4371 ml   Output --   Net 4371 ml       Last 3 Weights   04/13/25 2256 126 lb (57.2 kg)   04/13/25 1841 126 lb (57.2 kg)   03/29/23 1434 135 lb (61.2 kg)   07/05/22 1058 135 lb 2 oz (61.3 kg)       Exam    General: Alert, no distress, appears stated age.     Head:  Normocephalic, without obvious abnormality, atraumatic.   Eyes:  Sclera anicteric, EOMs intact.    Nose: Nares normal,  Mucosa normal    Throat: Lips normal   Neck: Supple, symmetrical, trachea midline   Lungs:   Clear to auscultation bilaterally. Normal effort   Chest wall:  No tenderness or deformity   Heart:  Regular rate and rhythm, S1, S2 normal, no murmur, rub or gallop appreciated   Abdomen:   Soft, NT/ND, Bowel sounds normal. No masses,  No organomegaly.    Extremities: Extremities normal, atraumatic, no cyanosis or LE edema.   Skin: Skin color, texture, turgor normal. No rashes or lesions.    Neurologic: Moving all extremities spontaneously, no focal deficit appreciated      Data Review:       Labs:     Recent Labs   Lab 04/13/25  1845 04/14/25  0630 04/15/25  0610   WBC 11.2* 9.4 6.5   HGB 10.3* 8.9* 8.0*   MCV 95.9 97.1 96.8    .0 128.0* 112.0*       Recent Labs   Lab 04/13/25  1845 04/14/25  0630 04/15/25  0610   * 157* 146*   K 5.4* 5.2* 4.7   * 123* 115*   CO2 22.0 22.0 20.0*   * 122* 108*   CREATSERUM 5.32* 5.35* 5.05*   CA 10.7* 10.1 8.4*   MG  --  3.2* 2.8*   * 167* 68*       Recent Labs   Lab 04/13/25  1845   ALT 25   AST 47*   ALB 4.1       Recent Labs   Lab 04/15/25  0553 04/15/25  0705 04/15/25  0736 04/15/25  0955 04/15/25  1139   PGLU 79 69* 187* 144* 133*       No results for input(s): \"TROP\" in the last 168 hours.      Meds:     Scheduled Medications[1]  Medication Infusions[2]  PRN Medications[3]       Assessment/Plan:     89 year old female with PMH including but not limited to HTN, CKD, DM, HL who p/t [facility] ED c weakness and hyperNa.      Weakness  FTT  Prior Falls   - likely 2/2 hyperNa and dementia and UTI  - PT/OT/SLP  - follow PO intake  - CTH chronic small vessel ischemic change   - CT spine multilevel degenerative disc disease in the cervical spine   - XR hip/shoulder no fx  - CXR noted, follow clinical sxs         Abnormal UA, UTI  - IV CTX  - Ucx >100K EColi  - await s/s     Acute kidney injury on CKD4:   - B/l creatinine of 2.4-2.9 mg/dL in setting of long-standing HTN, diabetic nephropathy and advanced age. follows [facility]   - apprec recs  - avoid nephrotoxins  - CT bladder wall thickening, no renal or ureteral stones.      Hypernatremia   On hypotonic fluid per renal  - follow BMP     Hyperkalemia   - per renal, IVF      HTN  - holding arb/toresemide      # constipation  bowel regimen     # Type 2 diabetes mellitus, controlled A1c 6.0   -Hyperglycemic protocol with accu-checks QID and low-dose sliding scale insulin. Will keep 1800 ADA diet. Will hold PO meds  - stressed imporatnce of BG control and compliance c meds to prevent long-term cardiac, vascular, neuro, renal complications      # Hypoglycemia  - protocol, likely dec PO intake      # Proph  - sqh        Dispo:  med  -Lives c dtr      Dispo: med    Questions/concerns were discussed with patient and dtr by bedside.     Total Time spent with patient and coordinating care:  55 minutes    Kentrell Nielsen MD  [facility] Hospitalist  [phone]  4/15/2025  3:49 PM             [1]    insulin aspart  1-10 Units Subcutaneous TID AC and HS    cefTRIAXone  1 g Intravenous Q24H    heparin  5,000 Units Subcutaneous 2 times per day   [2]    sodium chloride 200 mL/hr at 04/15/25 1252   [3]   glucose **OR** glucose **OR** glucose-vitamin C **OR** dextrose **OR** glucose **OR** glucose **OR** glucose-vitamin C    acetaminophen    polyethylene glycol (PEG 3350)    sennosides    bisacodyl    ondansetron    metoclopramide

## 2025-04-16 PROBLEM — E87.20 ACIDOSIS: Status: ACTIVE | Noted: 2025-04-16

## 2025-04-16 LAB
ANION GAP SERPL CALC-SCNC: 12 MMOL/L (ref 0–18)
BASOPHILS # BLD AUTO: 0.03 X10(3) UL (ref 0–0.2)
BASOPHILS NFR BLD AUTO: 0.5 %
BUN BLD-MCNC: 84 MG/DL (ref 9–23)
CALCIUM BLD-MCNC: 7.7 MG/DL (ref 8.7–10.6)
CHLORIDE SERPL-SCNC: 111 MMOL/L (ref 98–112)
CO2 SERPL-SCNC: 15 MMOL/L (ref 21–32)
CREAT BLD-MCNC: 4.68 MG/DL (ref 0.55–1.02)
EGFRCR SERPLBLD CKD-EPI 2021: 8 ML/MIN/1.73M2 (ref 60–?)
EOSINOPHIL # BLD AUTO: 0.07 X10(3) UL (ref 0–0.7)
EOSINOPHIL NFR BLD AUTO: 1.1 %
ERYTHROCYTE [DISTWIDTH] IN BLOOD BY AUTOMATED COUNT: 12.8 %
GLUCOSE BLD-MCNC: 205 MG/DL (ref 70–99)
GLUCOSE BLD-MCNC: 210 MG/DL (ref 70–99)
GLUCOSE BLD-MCNC: 256 MG/DL (ref 70–99)
GLUCOSE BLD-MCNC: 71 MG/DL (ref 70–99)
GLUCOSE BLD-MCNC: 80 MG/DL (ref 70–99)
HCT VFR BLD AUTO: 28.2 % (ref 35–48)
HGB BLD-MCNC: 8.4 G/DL (ref 12–16)
IMM GRANULOCYTES # BLD AUTO: 0.07 X10(3) UL (ref 0–1)
IMM GRANULOCYTES NFR BLD: 1.1 %
LYMPHOCYTES # BLD AUTO: 1.04 X10(3) UL (ref 1–4)
LYMPHOCYTES NFR BLD AUTO: 15.7 %
MAGNESIUM SERPL-MCNC: 2.5 MG/DL (ref 1.6–2.6)
MCH RBC QN AUTO: 28.4 PG (ref 26–34)
MCHC RBC AUTO-ENTMCNC: 29.8 G/DL (ref 31–37)
MCV RBC AUTO: 95.3 FL (ref 80–100)
MONOCYTES # BLD AUTO: 0.41 X10(3) UL (ref 0.1–1)
MONOCYTES NFR BLD AUTO: 6.2 %
NEUTROPHILS # BLD AUTO: 4.99 X10 (3) UL (ref 1.5–7.7)
NEUTROPHILS # BLD AUTO: 4.99 X10(3) UL (ref 1.5–7.7)
NEUTROPHILS NFR BLD AUTO: 75.4 %
OSMOLALITY SERPL CALC.SUM OF ELEC: 310 MOSM/KG (ref 275–295)
PLATELET # BLD AUTO: 109 10(3)UL (ref 150–450)
PLATELETS.RETICULATED NFR BLD AUTO: 7 % (ref 0–7)
POTASSIUM SERPL-SCNC: 4.4 MMOL/L (ref 3.5–5.1)
RBC # BLD AUTO: 2.96 X10(6)UL (ref 3.8–5.3)
SODIUM SERPL-SCNC: 138 MMOL/L (ref 136–145)
WBC # BLD AUTO: 6.6 X10(3) UL (ref 4–11)

## 2025-04-16 PROCEDURE — 99233 SBSQ HOSP IP/OBS HIGH 50: CPT | Performed by: INTERNAL MEDICINE

## 2025-04-16 RX ORDER — METOCLOPRAMIDE HYDROCHLORIDE 5 MG/ML
5 INJECTION INTRAMUSCULAR; INTRAVENOUS EVERY 8 HOURS PRN
Status: DISCONTINUED | OUTPATIENT
Start: 2025-04-16 | End: 2025-04-23

## 2025-04-16 NOTE — CONSULTS
[facility]  Report of Inpatient Wound Care Consultation    Ann Herndon Patient Status:  Inpatient    1925 MRN [id number]   [facility] 4NW-A Attending Manuel Chan MD   Hosp Day # 3 PCP Cory Sanchez DO     Reason for Consultation:  Eval and treat    History of Present Illness:  Ann Herndon is a a(n) 89 year old female. Patient presents with a pressure injury to left gluteus.Daughter in the room states the \" wound is better than what it was\". Patient with multiple comorbidities.        History:  Past Medical History[1]  Past Surgical History[2]   reports that she has never smoked. She has never used smokeless tobacco. She reports current alcohol use. She reports that she does not use drugs.      Allergies:  @ALLERGY    Laboratory Data:    Recent Labs   Lab 25  1845 25  1852 25  0630 25  1303 04/15/25  0610 04/15/25  0705 04/15/25  2117 25  0528 25  0658 25  1303   WBC 11.2*  --  9.4  --  6.5  --   --   --  6.6  --    HGB 10.3*  --  8.9*  --  8.0*  --   --   --  8.4*  --    HCT 34.8*  --  30.5*  --  27.6*  --   --   --  28.2*  --    .0  --  128.0*  --  112.0*  --   --   --  109.0*  --    CREATSERUM 5.32*  --  5.35*  --  5.05*  --   --   --  4.68*  --    *  --  122*  --  108*  --   --   --  84*  --    *  --  167*  --  68*  --   --   --  80  --    CA 10.7*  --  10.1  --  8.4*  --   --   --  7.7*  --    ALB 4.1  --   --   --   --   --   --   --   --   --    TP 7.3  --   --   --   --   --   --   --   --   --    PGLU  --    < >  --    < >  --    < > 106* 71  --  205*    < > = values in this interval not displayed.         ASSESSMENT:  Wound 25 Buttocks Left (Active)   Date First Assessed/Time First Assessed: 25   Present on Original Admission: Yes  Primary Wound Type: Pressure Injury  Location: Buttocks  Wound Location Orientation: Left      Assessments 2025  1:34 PM   Wound Image     Drainage  Amount Scant   Drainage Description Serosanguineous   Wound Length (cm) 2 cm   Wound Width (cm) 0.8 cm   Wound Surface Area (cm^2) 1.26 cm^2   Wound Depth (cm) 0.1 cm   Wound Volume (cm^3) 0.084 cm^3   Margins Well-defined edges   Page-wound Assessment Moist (scarring)   Wound Granulation Tissue Pink;Firm   Wound Bed Granulation (%) 100 %   Wound Odor None   Pressure Injury Stage Stage 3 (healing)     Wound Cleaning and Dressings:  Wound cleansing:  Cleanse with saline  Wound product: Silver foam/bordered silver foam  Page wound: apply zinc oxide daily as needed  Dressing change frequency:  Change every other day and as needed        Miscellaneous/Additional Orders:  Offloading:Low air loss Air mattress, turn every 2 hours and as needed as tolerated,waffle cushion for the chair, off load heel to prevent further skin breakdown      Miscellaneous orders: Increase dietary protein intake.Dietitian or Attending physician may need to evaluate amount of protein intake/supplements depending on the kidney functions and other medical conditions.    Care Summary:  Discussed Plan of care at bedside with patient and her daughter. Daughter verbally acknowledge understanding of all instructions and all questions were answered.      Recommendations:  May need to follow up with home health care   if discharged to  home      [place] you for this consultation and for allowing me to participate in the care of your patient.      Time Spent 30 Minutes.    Thank you,  Jane Huynh RN BSN [facility] Wound Care Team  4/16/2025  2:10 PM         [1]   Past Medical History:   Anemia    Arthritis    Back problem    herniated discs L4-L5    Blood disorder    anemia    Cellulitis, leg    Dementia (HCC)    Depression    Elevated blood pressure reading without diagnosis of hypertension    Esophageal reflux    Gastritis    GENITO-URINARY DISEASE    Glaucoma    HIGH BLOOD PRESSURE    HIGH CHOLESTEROL    Kidney disease     Lumbar degenerative disc disease    Lumbar radiculitis    Lumbar spondylosis    MADHU (obstructive sleep apnea)    AHI 5 RDI 5 REM AHI 0 Supine AHI 2 non-supine AHI 6     MADHU (obstructive sleep apnea)    Osteoarthrosis, unspecified whether generalized or localized, unspecified site    Other and unspecified hyperlipidemia    Pain in limb    Personal history of urinary (tract) infection    below th eknee    Renal disorder    elevated BUN and creatinine    Shortness of breath    uses oxygen 2.5 L/NC every night for hx of low saturation identified during sleep study 2014    Spinal stenosis    Type II or unspecified type diabetes mellitus without mention of complication, not stated as uncontrolled    Unspecified acute reaction to stress    Unspecified essential hypertension    Visual impairment    glasses   [2]   Past Surgical History:  Procedure Laterality Date    Angiogram  FEB 2013    Angiogram  Feb 2013    Done in [facility]    Appendectomy  1949    Appendectomy      Cataract surgery, complex  08/2010    Cath percutaneous  transluminal coronary angioplasty  2/2012    Normal in [facility]    Colonoscopy N/A 11/15/2016    Procedure: COLONOSCOPY;  Surgeon: Levar Callaway MD;  Location:  ENDOSCOPY    Colonoscopy & polypectomy  11/15/16= Polyp (serrated)    Repeat PRN    Fluor gid & loclzj ndl/cath spi dx/ther njx  8/30/2013    Procedure: LUMBAR EPIDURAL;  Surgeon: Srini Benjamin MD;  Location: [facility]    Fluor gid & loclzj ndl/cath spi dx/ther njx  9/16/2013    Procedure: LUMBAR EPIDURAL;  Surgeon: Srini Benjamin MD;  Location: [facility]    Fluor gid & loclzj ndl/cath spi dx/ther njx  10/8/2013    Procedure: LUMBAR EPIDURAL;  Surgeon: Srini Benjamin MD;  Location: [facility]    Fluor gid & loclzj ndl/cath spi dx/ther njx  6/24/2014    Procedure: ;  Surgeon: Srini Benjamin MD;  Location: [facility]    Fluor gid & loclzj ndl/cath spi dx/ther njx  N/A 7/24/2014    Procedure: LUMBAR EPIDURAL;  Surgeon: Srini Benjamin MD;  [facility]    Hysterectomy      Hysterectomy  1974    Injection, w/wo contrast, dx/therapeutic substance, epidural/subarachnoid; lumbar/sacral  8/30/2013    Procedure: LUMBAR EPIDURAL;  Surgeon: Srini Benjamin MD;  [place] [facility]    Injection, w/wo contrast, dx/therapeutic substance, epidural/subarachnoid; lumbar/sacral  9/16/2013    Procedure: LUMBAR EPIDURAL;  Surgeon: Srini Benjamin MD;  [facility]    Injection, w/wo contrast, dx/therapeutic substance, epidural/subarachnoid; lumbar/sacral  10/8/2013    Procedure: LUMBAR EPIDURAL;  Surgeon: Srini Benjamin MD;  [facility]    Injection, w/wo contrast, dx/therapeutic substance, epidural/subarachnoid; lumbar/sacral N/A 6/24/2014    Procedure: LUMBAR EPIDURAL;  Surgeon: Srini Benjamin MD;  [facility]    Injection, w/wo contrast, dx/therapeutic substance, epidural/subarachnoid; lumbar/sacral N/A 7/24/2014    Procedure: LUMBAR EPIDURAL;  Surgeon: Srini Benjamin MD;  [place] [facility]    M-sedaj by  phys perfrmg svc 5+ yr  8/30/2013    Procedure: LUMBAR EPIDURAL;  Surgeon: Srini Benjamin MD;  [place] [facility]    M-sedaj by  phys perfrmg svc 5+ yr  9/16/2013    Procedure: LUMBAR EPIDURAL;  Surgeon: Srini Benjamin MD;  [place]: [facility]    M-sedaj by  phys perfrmg svc 5+ yr  10/8/2013    Procedure: LUMBAR EPIDURAL;  Surgeon: Srini Benjamin MD;  [facility]: [facility]    M-sedaj by  phys perfrmg svc 5+ yr  6/24/2014    Procedure: ;  Surgeon: Srini Benjamin MD;  [place]: [facility]    M-sedaj by  phys perfrmg svc 5+ yr N/A 7/24/2014    Procedure: LUMBAR EPIDURAL;  Surgeon: Srini Benjamin MD;  [facility]    Patient documented not to have experienced any of the following events  8/30/2013    Procedure: LUMBAR EPIDURAL;  Surgeon: Srini Benjamin MD;  Location: [facility]    Patient documented not to have experienced any of the following events  9/16/2013    Procedure: LUMBAR EPIDURAL;  Surgeon: Srini Benjamin MD;  Location: [facility]    Patient documented not to have experienced any of the following events  10/8/2013    Procedure: LUMBAR EPIDURAL;  Surgeon: Srini Benjamin MD;  Location: [facility]    Patient documented not to have experienced any of the following events  6/24/2014    Procedure: ;  Surgeon: Srini Benjamin MD;  Location: [facility]    Patient documented not to have experienced any of the following events N/A 7/24/2014    Procedure: LUMBAR EPIDURAL;  Surgeon: Srini Benjamin MD;  Location: [facility]    Patient withough preoperative order for iv antibiotic surgical site infection prophylaxis.  8/30/2013    Procedure: LUMBAR EPIDURAL;  Surgeon: Srini Benjamin MD;  Location: [facility]    Patient withough preoperative order for iv antibiotic surgical site infection prophylaxis.  9/16/2013    Procedure: LUMBAR EPIDURAL;  Surgeon: Srini Benjamin MD;  Location: [facility]    Patient withough preoperative order for iv antibiotic surgical site infection prophylaxis.  10/8/2013    Procedure: LUMBAR EPIDURAL;  Surgeon: Srini Benjamin MD;  Location: [facility]    Patient withough preoperative order for iv antibiotic surgical site infection prophylaxis.  6/24/2014    Procedure: ;  Surgeon: Srini Benjamin MD;  Location: [facility]    Patient withough preoperative order for iv antibiotic surgical site infection prophylaxis. N/A 7/24/2014    Procedure: LUMBAR EPIDURAL;  Surgeon: Srini Benjamin MD;  Location: [facility]  [facility]    Upper gi endoscopy performed  10/7/13

## 2025-04-16 NOTE — PROGRESS NOTES
[facility]   part of [facility]     Nephrology Progress Note    Ann Herndon Patient Status:  Inpatient    1925 MRN [id number]   Location [facility] 4NW-A Attending Manuel Chan MD   Hosp Day # 3 PCP Cory Sanchez DO       SUBJECTIVE:  Stable overnight, remains somewhat lethargic, dtr at bedside        Physical Exam:   /41 (BP Location: Left arm)   Pulse 63   Temp 98 °F (36.7 °C) (Oral)   Resp 18   Ht 4' 9\" (1.448 m)   Wt 126 lb (57.2 kg)   LMP  (LMP Unknown)   SpO2 97%   BMI 27.27 kg/m²   Temp (24hrs), Av °F (36.7 °C), Min:97.9 °F (36.6 °C), Max:98.2 °F (36.8 °C)       Intake/Output Summary (Last 24 hours) at 2025 0833  Last data filed at 2025 0538  Gross per 24 hour   Intake 4699 ml   Output --   Net 4699 ml     Last 3 Weights   25 2256 126 lb (57.2 kg)   25 1841 126 lb (57.2 kg)   23 1434 135 lb (61.2 kg)   22 1058 135 lb 2 oz (61.3 kg)   21 1412 132 lb (59.9 kg)   21 1435 133 lb (60.3 kg)     General: asleep but arouses in no apparent distress.  HEENT: No scleral icterus, MMM  Neck: Supple, no EILEEN or thyromegaly  Cardiac: Regular rate and rhythm, S1, S2 normal, no murmur or rub  Lungs: Clear without wheezes, rales, rhonchi.    Abdomen: Soft, non-tender. + bowel sounds, no palpable organomegaly  Extremities: Without clubbing, cyanosis or edema.  Neurologic: moving all extremities  Skin: Warm and dry, no rash        Labs:     Recent Labs   Lab 25  1845 25  0630 04/15/25  0610 25  0658   WBC 11.2* 9.4 6.5 6.6   HGB 10.3* 8.9* 8.0* 8.4*   MCV 95.9 97.1 96.8 95.3   .0 128.0* 112.0* 109.0*       Recent Labs   Lab 25  1845 25  0630 04/15/25  0610 25  0658   * 157* 146* 138   K 5.4* 5.2* 4.7 4.4   * 123* 115* 111   CO2 22.0 22.0 20.0* 15.0*   * 122* 108* 84*   CREATSERUM 5.32* 5.35* 5.05* 4.68*   CA 10.7* 10.1 8.4* 7.7*   MG  --  3.2* 2.8* 2.5   * 167* 68*  80       Recent Labs   Lab 04/13/25  1845   ALT 25   AST 47*   ALB 4.1       Recent Labs   Lab 04/15/25  0955 04/15/25  1139 04/15/25  1753 04/15/25  2117 04/16/25  0528   PGLU 144* 133* 140* 106* 71       Meds:   Current Hospital Medications[1]      Impression/Plan:      1) Acute kidney injury on CKD4: Has long-standing chronic kidney disease stage 4 with baseline serum creatinine of 2.4-2.9 mg/dL in setting of long-standing HTN, diabetic nephropathy and advanced age. Now presenting with acute kidney injury and hypernatremia in setting of worsening mental status and poor PO intake with concomitant diuretic use. JOSE ANTONIO likely 2/2 pre-renal azotemia, improving with IVF. Will continue IVF. .       2) Hypernatremia: Due to decreased free water intake d/t worsening mental status. Improving with hypotonic fluids.      3) HTN: controlled currently, holding home losartan, hydralazine and torsemide.      4) Hyperkalemia: Due to JOSE ANTONIO and decreased distal sodium delivery. resolved. Continue IVF.      5)  acidosis- will change IVF to bicarb gtt      Discussed with daughter at bedside.        Sukhi Cantrell MD  4/16/2025  8:33 AM         [1]   Current Facility-Administered Medications   Medication Dose Route Frequency    sodium chloride 0.45% infusion   Intravenous Continuous    glucose (Dex4) 15 GM/59ML oral liquid 15 g  15 g Oral Q15 Min PRN    Or    glucose (Glutose) 40% oral gel 15 g  15 g Oral Q15 Min PRN    Or    glucose-vitamin C (Dex-4) chewable tab 4 tablet  4 tablet Oral Q15 Min PRN    Or    dextrose 50% injection 50 mL  50 mL Intravenous Q15 Min PRN    Or    glucose (Dex4) 15 GM/59ML oral liquid 30 g  30 g Oral Q15 Min PRN    Or    glucose (Glutose) 40% oral gel 30 g  30 g Oral Q15 Min PRN    Or    glucose-vitamin C (Dex-4) chewable tab 8 tablet  8 tablet Oral Q15 Min PRN    insulin aspart (NovoLOG) 100 Units/mL FlexPen 1-10 Units  1-10 Units Subcutaneous TID AC and HS    cefTRIAXone (Rocephin) 1 g in sodium chloride  0.9% 100 mL IVPB-ADDV  1 g Intravenous Q24H    heparin (Porcine) 5000 UNIT/ML injection 5,000 Units  5,000 Units Subcutaneous 2 times per day    acetaminophen (Tylenol Extra Strength) tab 500 mg  500 mg Oral Q4H PRN    polyethylene glycol (PEG 3350) (Miralax) 17 g oral packet 17 g  17 g Oral Daily PRN    sennosides (Senokot) tab 17.2 mg  17.2 mg Oral Nightly PRN    bisacodyl (Dulcolax) 10 MG rectal suppository 10 mg  10 mg Rectal Daily PRN    ondansetron (Zofran) 4 MG/2ML injection 4 mg  4 mg Intravenous Q6H PRN    metoclopramide (Reglan) 5 mg/mL injection 10 mg  10 mg Intravenous Q8H PRN

## 2025-04-16 NOTE — CM/SW NOTE
CM met with patient and daughter at bedside. HH list provided. Daughter would like to review and decide if they want HH closer to dc.     Will need follow up if agreeable/choice    Kandy Palomino, RN, BSN

## 2025-04-16 NOTE — PHYSICAL THERAPY NOTE
PHYSICAL THERAPY TREATMENT NOTE - INPATIENT    Room Number: 426/426-A     Session: 1     Number of Visits to Meet Established Goals: 5    Presenting Problem: AMS, incr weakness, lethargic  Co-Morbidities : dementia, MI, DM, CKD, HTN    PHYSICAL THERAPY MEDICAL/SOCIAL HISTORY  History related to current admission: Patient is a 89 year old female admitted on 4/13/2025 from home for incr lethargy,  altered mental status.  Pt diagnosed with failure to thrive, UTI.     HOME SITUATION  Type of Home: House  Home Layout: Two level, Stairlift  Stairs to Enter : 3   Railing: No         Lives With: Daughter (pt's legally blind son also lives w/ her)    Drives: No   Patient Regularly Uses: Glasses       Prior Level of [place]:   Per pt's daughter >> Pt resides in two story home with daughter and son who is legally blind. Bed/bath on second floor via stairlift. She ambulates with RW for household distances, use of wheelchair for longer distances. Family assists with 3 steps to enter via HHA. Had an unwitnessed fall (maybe a week ago), with subsequent R shoulder pain (imaging negative). Daughter reports significant decline in last week- she has been having to walk her to the bathroom. All she has been doing is ambulating to/from bed to bathroom. Daughter has been having to assist with bed mobility.   Typically her routine is to get up in the morning, get ready, go downstairs and spend the day downstairs until about 9PM.     PHYSICAL THERAPY ASSESSMENT   Patient demonstrates limited progress this session, goals  remain in progress.      Patient is requiring maximum assist and dependent as a result of the following impairments: decreased functional strength, decreased endurance/aerobic capacity, pain, impaired static and dynamic balance, impaired motor planning, decreased muscular endurance, and medical status.     Patient continues to function below baseline with bed mobility, transfers, and gait.  Next session anticipate  patient to progress bed mobility, transfers, maintaining seated position, and standing prolonged periods.  Physical Therapy will continue to follow patient for duration of hospitalization.    Patient continues to benefit from continued skilled PT services: to promote return to prior level of function and safety with continuous assistance and gradual rehabilitative therapy .    PLAN DURING HOSPITALIZATION  Nursing Mobility Recommendation : Lift Equipment  PT Device Recommendation: Rolling walker  PT Treatment Plan: Bed mobility, Body mechanics, Coordination, Endurance, Energy conservation, Patient education, Family education, Gait training, Neuromuscular re-educate, Range of motion, Stoop training, Strengthening, Stair training, Transfer training, Balance training  Frequency (Obs): 3-5x/week     CURRENT GOALS   Goal #1 Patient is able to demonstrate supine - sit EOB @ level: supervision     Goal #2 Patient is able to demonstrate transfers Sit to/from Stand at assistance level: supervision     Goal #3 Patient is able to ambulate 50 feet with assist device: walker - rolling at assistance level: supervision     Goal #4 Patient is able to ambulate 100 feet with RW at supervision.   Goal #5 Patient is able to ascend/descend 3 steps with HHA at CGA.    Goal #6    Goal Comments: Goals established on 4/14/2025 4/16/2025 all goals ongoing    SUBJECTIVE  \"My legs hurt\"    OBJECTIVE  Precautions: Bed/chair alarm    WEIGHT BEARING RESTRICTION     PAIN ASSESSMENT   Rating: Unable to rate  Location: legs  Management Techniques: Body mechanics    BALANCE                                                                                                                       Static Sitting: Fair -  Dynamic Sitting: Poor +           Static Standing: Dependent  Dynamic Standing: Not tested    ACTIVITY TOLERANCE                         O2 WALK       AM-PAC '6-Clicks' INPATIENT SHORT FORM - BASIC MOBILITY  How much difficulty does the  patient currently have...  Patient Difficulty: Turning over in bed (including adjusting bedclothes, sheets and blankets)?: A Lot   Patient Difficulty: Sitting down on and standing up from a chair with arms (e.g., wheelchair, bedside commode, etc.): Unable   Patient Difficulty: Moving from lying on back to sitting on the side of the bed?: A Lot   How much help from another person does the patient currently need...   Help from Another: Moving to and from a bed to a chair (including a wheelchair)?: Total   Help from Another: Need to walk in hospital room?: Total   Help from Another: Climbing 3-5 steps with a railing?: Total     AM-PAC Score:  Raw Score: 8   Approx Degree of Impairment: 86.62%   Standardized Score (AM-PAC Scale): 28.58   CMS Modifier (G-Code): CM    FUNCTIONAL ABILITY STATUS  Gait Assessment   Functional Mobility/Gait Assessment  Gait Assistance: Not tested    Skilled Therapy Provided    Bed Mobility:  Rolling: NT   Supine<>Sit: maxA   Sit<>Supine: NT     Transfer Mobility:  Sit<>Stand: maxA x2 attempted   Stand<>Sit: maxA x2 attempted   Gait: NT    Therapist's Comments: RN cleared for session. Pt agreeable for therapy, received supine. Dtr present for session. Pt cued on LE sequencing for supine<>sit transfer, cued on scooting towards EOB. Cued on sit<>stand transfer with proper placement of BUEs. Pt unable to clear buttocks. Instructed to call for nursing staff for any needs and OOB mobility.       Patient End of Session: In bed, Needs met, Call light within reach, RN aware of session/findings, All patient questions and concerns addressed, Hospital anti-slip socks, Alarm set, Family present    PT Session Time: 30 minutes  Therapeutic Activity: 15 minutes  Neuromuscular Re-education: 10 minutes

## 2025-04-16 NOTE — PLAN OF CARE
Patient alert and oriented x1. VSS. Afebrile. On 2L NC. No c/o pain at this time. No BG coverage provided per parameters. IVF infusing. See MAR. Large dark brown/black BM x1. MD notified of stool color, no new orders received. Safety precautions continued. Call light within reach.   Problem: METABOLIC/FLUID AND ELECTROLYTES - ADULT  Goal: Electrolytes maintained within normal limits  Description: INTERVENTIONS:- Monitor labs and rhythm and assess patient for signs and symptoms of electrolyte imbalances- Administer electrolyte replacement as ordered- Monitor response to electrolyte replacements, including rhythm and repeat lab results as appropriate- Fluid restriction as ordered- Instruct patient on fluid and nutrition restrictions as appropriate  Outcome: Progressing  Goal: Glucose maintained within prescribed range  Description: INTERVENTIONS:- Monitor Blood Glucose as ordered- Assess for signs and symptoms of hyperglycemia and hypoglycemia- Administer ordered medications to maintain glucose within target range- Assess barriers to adequate nutritional intake and initiate nutrition consult as needed- Instruct patient on self management of diabetes  Outcome: Progressing     Problem: SAFETY ADULT - FALL  Goal: Free from fall injury  Description: INTERVENTIONS:- Assess pt frequently for physical needs- Identify cognitive and physical deficits and behaviors that affect risk of falls.- Gideon fall precautions as indicated by assessment.- Educate pt/family on patient safety including physical limitations- Instruct pt to call for assistance with activity based on assessment- Modify environment to reduce risk of injury- Provide assistive devices as appropriate- Consider OT/PT consult to assist with strengthening/mobility- Encourage toileting schedule  Outcome: Progressing     Problem: GASTROINTESTINAL - ADULT  Goal: Maintains or returns to baseline bowel function  Description: INTERVENTIONS:- Assess bowel function-  Maintain adequate hydration with IV or PO as ordered and tolerated- Evaluate effectiveness of GI medications- Encourage mobilization and activity- Obtain nutritional consult as needed- Establish a toileting routine/schedule- Consider collaborating with pharmacy to review patient's medication profile  Outcome: Not Progressing

## 2025-04-16 NOTE — PLAN OF CARE
Problem: METABOLIC/FLUID AND ELECTROLYTES - ADULT  Goal: Electrolytes maintained within normal limits  Description: INTERVENTIONS:- Monitor labs and rhythm and assess patient for signs and symptoms of electrolyte imbalances- Administer electrolyte replacement as ordered- Monitor response to electrolyte replacements, including rhythm and repeat lab results as appropriate- Fluid restriction as ordered- Instruct patient on fluid and nutrition restrictions as appropriate  Outcome: Progressing  Goal: Glucose maintained within prescribed range  Description: INTERVENTIONS:- Monitor Blood Glucose as ordered- Assess for signs and symptoms of hyperglycemia and hypoglycemia- Administer ordered medications to maintain glucose within target range- Assess barriers to adequate nutritional intake and initiate nutrition consult as needed- Instruct patient on self management of diabetes  Outcome: Progressing     Problem: GASTROINTESTINAL - ADULT  Goal: Maintains or returns to baseline bowel function  Description: INTERVENTIONS:- Assess bowel function- Maintain adequate hydration with IV or PO as ordered and tolerated- Evaluate effectiveness of GI medications- Encourage mobilization and activity- Obtain nutritional consult as needed- Establish a toileting routine/schedule- Consider collaborating with pharmacy to review patient's medication profile  Outcome: Progressing  Goal: Achieves appropriate nutritional intake (bariatric)  Description: INTERVENTIONS:- Monitor for over-consumption- Identify factors contributing to increased intake, treat as appropriate- Monitor I&O, WT and lab values- Obtain nutritional consult as needed- Evaluate psychosocial factors contributing to over-consumption  Outcome: Progressing     Problem: SKIN/TISSUE INTEGRITY - ADULT  Goal: Skin integrity remains intact  Description: INTERVENTIONS- Assess and document risk factors for pressure ulcer development- Assess and document skin integrity- Monitor for areas  of redness and/or skin breakdown- Initiate interventions, skin care algorithm/standards of care as needed  Outcome: Progressing     Problem: SAFETY ADULT - FALL  Goal: Free from fall injury  Description: INTERVENTIONS:- Assess pt frequently for physical needs- Identify cognitive and physical deficits and behaviors that affect risk of falls.- Coulter fall precautions as indicated by assessment.- Educate pt/family on patient safety including physical limitations- Instruct pt to call for assistance with activity based on assessment- Modify environment to reduce risk of injury- Provide assistive devices as appropriate- Consider OT/PT consult to assist with strengthening/mobility- Encourage toileting schedule  Outcome: Progressing     Problem: PAIN - ADULT  Goal: Verbalizes/displays adequate comfort level or patient's stated pain goal  Description: INTERVENTIONS:- Encourage pt to monitor pain and request assistance- Assess pain using appropriate pain scale- Administer analgesics based on type and severity of pain and evaluate response- Implement non-pharmacological measures as appropriate and evaluate response- Consider cultural and social influences on pain and pain management- Manage/alleviate anxiety- Utilize distraction and/or relaxation techniques- Monitor for opioid side effects- Notify MD/LIP if interventions unsuccessful or patient reports new pain- Anticipate increased pain with activity and pre-medicate as appropriate  Outcome: Progressing     Patient more alert today, appetite improving 1:1 feed, awake more during shift. Pt c/o R shoulder pain, acetaminophen given per pt and daughter request, pt asleep upon  reassessment. Dry flaky skin to R heel, bilateral heels offloaded, waffle cushion in place, turns provided as patient allows. Wound care evaluated sacral wound. Fall and safety precautions in place. Patient/family updated and in agreement with POC.

## 2025-04-16 NOTE — SLP NOTE
SPEECH DAILY NOTE - INPATIENT    ASSESSMENT & PLAN   ASSESSMENT  Pt seen for dysphagia tx to assess tolerance with recommended diet, ensure proper utilization of aspiration precautions and provide pt/family education.  Patient received drowsy in bed with daughter present at bedside. Patient reported good tolerance of PO intake. Her fluctuating level of alertness has somewhat limited her ability to eat and drink consistently, but no overt s/s of aspiration observed with PO intake. She now has her dentures placed and exhibited improved mastication. Recommend patient continue current diet. No further SLP service recommended as patient tolerating a generalized diet consistent with her baseline. Education provided re: recommendations.    Diet Recommendations - Solids: Soft/ Easy to chew  Diet Recommendations - Liquids: Thin Liquids       Aspiration Precautions: Upright position, Small bites, Small sips  Medication Administration Recommendations: One pill at a time    Patient Experiencing Pain: No                Treatment Plan  Treatment Plan/Recommendations: No further inpatient SLP service warranted    Interdisciplinary Communication: NA            GOALS  Goal #1 The patient will tolerate soft consistency and thin liquids without overt signs or symptoms of aspiration with 90 % accuracy over 1-2 session(s).  Met   Goal #2 The patient/family/caregiver will demonstrate understanding and implementation of aspiration precautions and swallow strategies independently over 1-2 session(s).     Met   Goal #3       Goal #4       Goal #5       Goal #6       Goal #7       Goal #8            FOLLOW UP  Follow Up Needed (Documentation Required): No  SLP Follow-up Date: 04/16/25  Duration: 1 week    Session: 2/2    If you have any questions, please contact JANETT Leonardo

## 2025-04-17 ENCOUNTER — APPOINTMENT (OUTPATIENT)
Dept: GENERAL RADIOLOGY | Facility: HOSPITAL | Age: OVER 89
DRG: 682 | End: 2025-04-17
Attending: INTERNAL MEDICINE
Payer: MEDICARE

## 2025-04-17 ENCOUNTER — APPOINTMENT (OUTPATIENT)
Dept: CT IMAGING | Facility: HOSPITAL | Age: OVER 89
End: 2025-04-17
Attending: INTERNAL MEDICINE
Payer: MEDICARE

## 2025-04-17 ENCOUNTER — APPOINTMENT (OUTPATIENT)
Dept: GENERAL RADIOLOGY | Facility: HOSPITAL | Age: OVER 89
End: 2025-04-17
Attending: INTERNAL MEDICINE
Payer: MEDICARE

## 2025-04-17 ENCOUNTER — APPOINTMENT (OUTPATIENT)
Dept: CT IMAGING | Facility: HOSPITAL | Age: OVER 89
DRG: 682 | End: 2025-04-17
Attending: INTERNAL MEDICINE
Payer: MEDICARE

## 2025-04-17 ENCOUNTER — APPOINTMENT (OUTPATIENT)
Dept: ULTRASOUND IMAGING | Facility: HOSPITAL | Age: OVER 89
End: 2025-04-17
Attending: STUDENT IN AN ORGANIZED HEALTH CARE EDUCATION/TRAINING PROGRAM
Payer: MEDICARE

## 2025-04-17 ENCOUNTER — APPOINTMENT (OUTPATIENT)
Dept: ULTRASOUND IMAGING | Facility: HOSPITAL | Age: OVER 89
DRG: 682 | End: 2025-04-17
Attending: STUDENT IN AN ORGANIZED HEALTH CARE EDUCATION/TRAINING PROGRAM
Payer: MEDICARE

## 2025-04-17 PROBLEM — Z51.5 PALLIATIVE CARE ENCOUNTER: Status: ACTIVE | Noted: 2025-04-17

## 2025-04-17 PROBLEM — Z71.89 COUNSELING REGARDING ADVANCE CARE PLANNING AND GOALS OF CARE: Status: ACTIVE | Noted: 2025-04-17

## 2025-04-17 LAB
AMMONIA PLAS-MCNC: 30 UMOL/L (ref 11–32)
ANION GAP SERPL CALC-SCNC: 11 MMOL/L (ref 0–18)
ARTERIAL PATENCY WRIST A: POSITIVE
BASE EXCESS BLDA CALC-SCNC: -5.2 MMOL/L (ref ?–2)
BODY TEMPERATURE: 98.6 F
BUN BLD-MCNC: 91 MG/DL (ref 9–23)
CA-I BLD-SCNC: 1.15 MMOL/L (ref 0.95–1.32)
CALCIUM BLD-MCNC: 8.2 MG/DL (ref 8.7–10.6)
CHLORIDE SERPL-SCNC: 105 MMOL/L (ref 98–112)
CO2 SERPL-SCNC: 20 MMOL/L (ref 21–32)
COHGB MFR BLD: 2.1 % SAT (ref 0–3)
CREAT BLD-MCNC: 4.56 MG/DL (ref 0.55–1.02)
EGFRCR SERPLBLD CKD-EPI 2021: 8 ML/MIN/1.73M2 (ref 60–?)
GLUCOSE BLD-MCNC: 187 MG/DL (ref 70–99)
GLUCOSE BLD-MCNC: 201 MG/DL (ref 70–99)
GLUCOSE BLD-MCNC: 206 MG/DL (ref 70–99)
GLUCOSE BLD-MCNC: 234 MG/DL (ref 70–99)
GLUCOSE BLD-MCNC: 243 MG/DL (ref 70–99)
HCO3 BLDA-SCNC: 20.8 MEQ/L (ref 21–27)
HGB BLD-MCNC: 8.6 G/DL (ref 12–16)
L/M: 2 L/MIN
LACTATE BLD-SCNC: 0.7 MMOL/L (ref 0.5–2)
LACTATE SERPL-SCNC: 0.9 MMOL/L (ref 0.5–2)
METHGB MFR BLD: 0.4 % SAT (ref 0.4–1.5)
OSMOLALITY SERPL CALC.SUM OF ELEC: 316 MOSM/KG (ref 275–295)
OXYHGB MFR BLDA: 92.7 % (ref 92–100)
PCO2 BLDA: 36 MM HG (ref 35–45)
PH BLDA: 7.35 [PH] (ref 7.35–7.45)
PO2 BLDA: 67 MM HG (ref 80–100)
POTASSIUM BLD-SCNC: 4.5 MMOL/L (ref 3.6–5.1)
POTASSIUM SERPL-SCNC: 4.3 MMOL/L (ref 3.5–5.1)
SODIUM BLD-SCNC: 131 MMOL/L (ref 135–145)
SODIUM SERPL-SCNC: 136 MMOL/L (ref 136–145)

## 2025-04-17 PROCEDURE — 93971 EXTREMITY STUDY: CPT | Performed by: STUDENT IN AN ORGANIZED HEALTH CARE EDUCATION/TRAINING PROGRAM

## 2025-04-17 PROCEDURE — 70450 CT HEAD/BRAIN W/O DYE: CPT | Performed by: INTERNAL MEDICINE

## 2025-04-17 PROCEDURE — 99233 SBSQ HOSP IP/OBS HIGH 50: CPT | Performed by: INTERNAL MEDICINE

## 2025-04-17 PROCEDURE — 99222 1ST HOSP IP/OBS MODERATE 55: CPT | Performed by: NURSE PRACTITIONER

## 2025-04-17 PROCEDURE — 71045 X-RAY EXAM CHEST 1 VIEW: CPT | Performed by: INTERNAL MEDICINE

## 2025-04-17 RX ORDER — HYDRALAZINE HYDROCHLORIDE 20 MG/ML
10 INJECTION INTRAMUSCULAR; INTRAVENOUS EVERY 6 HOURS PRN
Status: DISCONTINUED | OUTPATIENT
Start: 2025-04-17 | End: 2025-04-23

## 2025-04-17 RX ORDER — ACETAMINOPHEN 10 MG/ML
1000 INJECTION, SOLUTION INTRAVENOUS EVERY 6 HOURS PRN
Status: DISCONTINUED | OUTPATIENT
Start: 2025-04-17 | End: 2025-04-23

## 2025-04-17 NOTE — PROGRESS NOTES
[facility] Hospitalist Progress Note     PCP: Cory Sanchez DO    Chief Complaint: follow-up   Follow up for: The primary encounter diagnosis was Hypernatremia. Diagnoses of Acute on chronic renal insufficiency, Type 2 MI (myocardial infarction) (HCC), Urinary tract infection with hematuria, site unspecified, and Dehydration, severe were also pertinent to this visit.    Overnight/Interim Events:    SUBJECTIVE:  On 2L. Dtr concerned as room looks different/moved around today, pt more altered. Lethargic     Hard to walk at home    OBJECTIVE:  Temp:  [98 °F (36.7 °C)-98.5 °F (36.9 °C)] 98 °F (36.7 °C)  Pulse:  [54-62] 55  Resp:  [16-18] 18  BP: (117-189)/(33-47) 189/47  SpO2:  [91 %-98 %] 96 %    Intake/Output:    Intake/Output Summary (Last 24 hours) at 4/17/2025 0847  Last data filed at 4/17/2025 0541  Gross per 24 hour   Intake 2671 ml   Output --   Net 2671 ml       Last 3 Weights   04/13/25 2256 126 lb (57.2 kg)   04/13/25 1841 126 lb (57.2 kg)   03/29/23 1434 135 lb (61.2 kg)   07/05/22 1058 135 lb 2 oz (61.3 kg)       Exam    General: no distress, appears stated age.  Lethargic    Head:  Normocephalic, without obvious abnormality, atraumatic.   Eyes:  Sclera anicteric, EOMs intact.    Nose: Nares normal,  Mucosa normal    Throat: Lips normal   Neck: Supple, symmetrical, trachea midline   Lungs:   Clear to auscultation bilaterally. Normal effort   Chest wall:  No tenderness or deformity   Heart:  Regular rate and rhythm, S1, S2 normal, no murmur, rub or gallop appreciated   Abdomen:   Soft, NT/ND, Bowel sounds normal. No masses,  No organomegaly.    Extremities: Extremities normal, atraumatic, no cyanosis or LE edema.   Skin: Skin color, texture, turgor normal. No rashes or lesions.    Neurologic: Moving all extremities spontaneously, no focal deficit appreciated.   Unable to follow commands for full neuro exam           Data Review:       Labs:     Recent Labs   Lab 04/13/25  1845 04/14/25  0630 04/15/25  0610  04/16/25  0658   WBC 11.2* 9.4 6.5 6.6   HGB 10.3* 8.9* 8.0* 8.4*   MCV 95.9 97.1 96.8 95.3   .0 128.0* 112.0* 109.0*       Recent Labs   Lab 04/13/25  1845 04/14/25  0630 04/15/25  0610 04/16/25  0658 04/17/25  0551   * 157* 146* 138 136   K 5.4* 5.2* 4.7 4.4 4.3   * 123* 115* 111 105   CO2 22.0 22.0 20.0* 15.0* 20.0*   * 122* 108* 84* 91*   CREATSERUM 5.32* 5.35* 5.05* 4.68* 4.56*   CA 10.7* 10.1 8.4* 7.7* 8.2*   MG  --  3.2* 2.8* 2.5  --    * 167* 68* 80 206*       Recent Labs   Lab 04/13/25  1845   ALT 25   AST 47*   ALB 4.1       Recent Labs   Lab 04/16/25  0528 04/16/25  1303 04/16/25  1603 04/16/25  2042 04/17/25  0509   PGLU 71 205* 256* 210* 187*       No results for input(s): \"TROP\" in the last 168 hours.      Meds:     Scheduled Medications[1]  Medication Infusions[2]  PRN Medications[3]       Assessment/Plan:     89 year old female with PMH including but not limited to HTN, CKD, DM, HL who p/t [facility] ED c weakness and hyperNa.      Weakness  FTT  Prior Falls   Lethargy  - likely 2/2 hyperNa and dementia and UTI and age   - PT/OT/SLP  - follow PO intake  - CTH chronic small vessel ischemic change   - CT spine multilevel degenerative disc disease in the cervical spine   - XR hip/shoulder no fx  - CXR noted, follow clinical sxs   - dtr reports supposed to have MRI next week as o/p   - dtr feels worse today, will check blood cxs, ABG 7.35/36/67, repeat CXR noted, no coughing; CTX would have helped for PNA, LA, PCT, ammonia   - IV tylenol for pain      Abnormal UA, UTI  - IV CTX to ancef   - Ucx >100K EColi  - pan sens     Acute kidney injury on CKD4:   - B/l creatinine of 2.4-2.9 mg/dL in setting of long-standing HTN, diabetic nephropathy and advanced age. follows [facility]   - JOSE ANTONIO likely 2/2 pre-renal azotemia  - apprec recs, IVF, improving   - avoid nephrotoxins  - CT bladder wall thickening, no renal or ureteral stones.      Hypernatremia, now resolved   On hypotonic fluid  per renal  - follow BMP     Hyperkalemia   - per renal, IVF      HTN c urgency   - holding arb/toresemide  - SBP up to 180s  - prn hydralazine added, carl/w MD Óscar      # constipation  bowel regimen     # Type 2 diabetes mellitus, controlled A1c 6.0   -Hyperglycemic protocol with accu-checks QID and low-dose sliding scale insulin. Will keep 1800 ADA diet. Will hold PO meds  - stressed imporatnce of BG control and compliance c meds to prevent long-term cardiac, vascular, neuro, renal complications        # B/l UE swelling  - RUE doppler neg  - check LUE doppler    # Hypoglycemia x1 in AM  - protocol, likely dec PO intake   - lower SSI if persists     # GOC  - pallaitive c/s apprec, FC for now, can re-assess as needed pending clinical situation. RANDI Monsivais       # Proph  - sqh        Dispo: med  -Lives c dtr        Questions/concerns were discussed with patient and dtr by bedside. D/w RN. [facility] hospitalist to resume care in AM, will discuss plan with them. Total Time spent with patient and coordinating care:  55 minutes    Kentrell Nielsen MD  [facility] Hospitalist  [phone]  4/17/2025  9:22 AM      Addendum  Still confused, possible facial droop but may be more related to her position in bed/dentures. Checking CTH. Neuro c/s for completeness. Some alteration may be 2/2 being in hospital and sleep cycle. Total time in day 80 min, [id number] and [id number]    Kentrell Nielsen MD  [facility] Hospitalist  [phone]  4/17/2025  4:09 PM           [1]    insulin aspart  1-10 Units Subcutaneous TID AC and HS    cefTRIAXone  1 g Intravenous Q24H    heparin  5,000 Units Subcutaneous 2 times per day   [2]    sodium bicarbonate in D5W infusion 75 mEq (04/17/25 0815)   [3]   hydrALAzine    metoclopramide    glucose **OR** glucose **OR** glucose-vitamin C **OR** dextrose **OR** glucose **OR** glucose **OR** glucose-vitamin C    acetaminophen    polyethylene glycol (PEG 3350)    sennosides     bisacodyl    ondansetron

## 2025-04-17 NOTE — OCCUPATIONAL THERAPY NOTE
OCCUPATIONAL THERAPY TREATMENT NOTE - INPATIENT     Room Number: 426/426-A  Session: 1   Number of Visits to Meet Established Goals: 7    Presenting Problem: UTI, severe dehydration, Hypernatremia, AMS, fall, RUE pain    HOME SITUATION  Type of Home: House  Home Layout: Two level, Stairlift  Lives With: Daughter (pt's legally blind son also lives w/ her)   Toilet and Equipment: Standard height toilet, Grab bar  Shower/Tub and Equipment: Walk-in shower, Grab bar, Shower chair  Other Equipment:  (RW, transport chair)   Hand Dominance: Right  Drives: No  Patient Regularly Uses: Glasses   Prior Level of Function: per daughter, patient requires supervision to min A w/ use of walker in the home.  Patient has been in bed for the past week d/t daughter unable to get her up on her feet.  H/o of a fall one week prior to admit.    ASSESSMENT   Patient demonstrates fair progress this session, goals remain in progress.    Patient continues to function below baseline with self care and functional mobility..   Contributing factors to remaining limitations include decreased functional strength, decreased functional reach, decreased endurance, pain, impaired   balance, impaired coordination, impaired motor planning, cognitive deficits ( ), limited RUE ROM, and decreased safety awareness.  Next session anticipate patient to progress grooming, eating, bed mobility, and transfers.  Occupational Therapy will continue to follow patient for duration of hospitalization.    Patient continues to benefit from continued skilled OT services: to promote return to prior level of function and safety with continuous assistance and gradual rehabilitative therapy.     History Related to Current Admission: Patient is a 89 year old female admitted on 4/13/2025 with Presenting Problem: UTI, severe dehydration, Hypernatremia, AMS, fall, RUE pain. Co-Morbidities : dementia, MI, DM, CKD, HTN     Imaging: R shoulder xray neg for acute fx, CTH neg, C-spine  CT neg acute fx, severe stenosis C4-5  CTH: neg for acute process  WEIGHT BEARING RESTRICTION         FUNCTIONAL TRANSFER ASSESSMENT  Sit to Stand: Edge of Bed; Chair  Edge of Bed: Dependent  Chair: Dependent    BED MOBILITY  Rolling: Maximum Assist  Supine to Sit : Maximum Assist  Sit to Supine (OT): Maximum Assist  Scooting: mod A    BALANCE ASSESSMENT  Static Sitting: Moderate Assist  Static Standing: Moderate Assist    FUNCTIONAL ADL ASSESSMENT  Grooming Seated: Moderate Assist  LB Dressing Seated: Maximum Assist  Toileting Seated: Maximum Assist    ACTIVITY TOLERANCE:   Pulse: 65  Heart Rate Source: Monitor                   O2 SATURATIONS       EDUCATION PROVIDED  Patient Education : Role of Occupational Therapy; Plan of Care; Discharge Recommendations; DME Recommendations; Functional Transfer Techniques; Fall Prevention; Edema Reduction; Posture/Positioning; Energy Conservation; Proper Body Mechanics  Patient's Response to Education: Does Not Demonstrate Skills Needed for Learning  Family/Caregiver Education : Role of Occupational Therapy; Plan of Care  Family/Caregiver's Response to Education: Verbalized Understanding    Equipment used: RW  Demonstrates functional use, Would benefit from additional trial      Exercises:    Exercises Repetitions Comments   Scapular elevation     Scapular retraction     Shoulder rolls     Shoulder flexion     Shoulder abduction     Shoulder internal/external rotation     Forward punch     Elbow flexion     Elbow extension     Forearm pronation/supination     Wrist flexion/extension     Gross grasp/fist pumps     Ankle pumps     Knee extension     Marching         Therapist comments: patient lethargic throughout session.  Patient followed <25% motor commands.    Patient End of Session: Up in chair, With  staff, Needs met, Call light within reach, RN aware of session/findings, All patient questions and concerns addressed, Alarm set, Discussed recommendations with case  manager/    SUBJECTIVE  PAIN ASSESSMENT  Rating: Unable to rate  Location: RUE  Management Techniques: Activity promotion, Repositioning, Relaxation, Nurse notified     OBJECTIVE  Precautions: Bed/chair alarm    AM-PAC ‘6-Clicks’ Inpatient Daily Activity Short Form  -   Putting on and taking off regular lower body clothing?: Total  -   Bathing (including washing, rinsing, drying)?: Total  -   Toileting, which includes using toilet, bedpan or urinal? : Total  -   Putting on and taking off regular upper body clothing?: Total  -   Taking care of personal grooming such as brushing teeth?: A Lot  -   Eating meals?: A Lot    AM-PAC Score:  Score: 8  Approx Degree of Impairment: 85.69%  Standardized Score (AM-PAC Scale): 22.86    PLAN  OT Device Recommendations: TBD  OT Treatment Plan: Balance activities, Energy conservation/work simplification techniques, ADL training, Functional transfer training, UE strengthening/ROM, Endurance training, Patient/Family education, Patient/Family training, Cognitive reorientation, Equipment eval/education, Compensatory technique education  Rehab Potential : Good  Frequency: 3-5x/week    (ongoing 4/17/2025)  ADL Goals  Patient will perform toileting with supervision and AE PRN  Patient will perform LB dressing with supervision and AE PRN     Functional Transfer Goals  Patient will perform bed mobility supine to sit with supervision  Patient will perform bed mobility sit to supine with supervision  Patient will perform toilet transfer with supervision     Additional Goals:  Patient will state precautions and maintain during ADL    OT Session Time: 30 minutes  Self-Care Home Management: 15 minutes  Therapeutic Activity: 15 minutes  Neuromuscular Re-education:  minutes  Therapeutic Exercise:  minutes  Cognitive Skills:  minutes  Sensory Integrative:  minutes  Orthotic Management and Training:  minutes  Can add/delete any of these

## 2025-04-17 NOTE — DIETARY NOTE
[facility]   part of [facility]    NUTRITION ASSESSMENT    Unable to diagnose malnutrition criteria at this time.    NUTRITION INTERVENTION:    Meal and Snacks - Continue Regular diet with soft/easy to chew modifier per SLP. Diet as tolerated; monitor patient po intake. Encourage adequate po of appropriate diet.  Medical Food Supplements - RD added Ensure Plus High Protein BID. Rationale/use for oral supplements discussed.      PATIENT STATUS:     4/17/25 Visited patient in room, daughter at bedside. Interview done in [place], daughter providing information since patient is sleeping. Reports decreased appetite continues. Documented variable PO intake (5-60% of meals) Yesterday she had 2 meals - good PO intake with breakfast, for dinner daughter ordered thin pizza and offered center pieces that where softer, took bites only and apple sauce. This morning she has been sleeping and hasn't had a meal.  Noted  unopened ONS in room, will change to Ensure since K and Mg are now WNL and will increase frequency.    04/14/25  Interview done in [place], qualified bilingual staff member as of 7/18/2024 on a passing score of a Language Line proficiency test.    89 yo female. Presented with AMS, family notes progressive decline over 1 week. Decreased PO intake. Admitted on 4/13 with JOSE ANTONIO and hypernatremia.  MST triggered to be seen. Visited patient in room, daughter at bedside and providing most information. Patient mostly with eyes closed.  Patient has not been eating much over the past week, daughter was offering Ensure to supplement intake. Daughter and patient agreeable to ONS during admission, Nepro indicated with current labs.  Per daughter before current symptoms, patient was having 3 meals per day with good PO intake and has maintained weight 131lbs  +-5 lbs. Weight was taken at PCP last week - 126lbs.   SLP following.    Pmhx - Includes dementia, DM, HTN, CKD, HLD    ANTHROPOMETRICS:  Ht: 144.8 cm (4' 9\")  Wt:  57.2 kg (126 lb).   BMI: Body mass index is 27.27 kg/m².  IBW: 45.5 kg      WEIGHT HISTORY:   Weight loss: No, per daughter's reported UBW range.     Wt Readings from Last 10 Encounters:   04/13/25 57.2 kg (126 lb)   03/29/23 61.2 kg (135 lb)   07/05/22 61.3 kg (135 lb 2 oz)   11/03/21 59.9 kg (132 lb)   11/02/21 60.3 kg (133 lb)   07/08/21 58.1 kg (128 lb)   04/22/21 57.6 kg (127 lb)   04/15/21 56.7 kg (125 lb)   03/19/21 59 kg (130 lb)   02/17/21 59 kg (130 lb)        NUTRITION:  Diet:       Procedures    Regular/General diet Texture Consistency: Soft / Easy to Chew ; Is Patient on Accuchecks? Yes      Food Allergies: No  Cultural/Ethnic/Quaker Preferences Addressed: Yes    Percent Meals Eaten (last 3 days)       Date/Time Percent Meals Eaten (%)    04/14/25 1555 60 %    04/15/25 1034 5 %    04/15/25 1558 50 %    04/16/25 1201 50 %    04/16/25 1742 30 %    04/17/25 0900 0 %            GI system review: constipation and decreased appetite  Last BM Date: 04/15/25  Skin and wounds: pressure injury on sacrum documented by RN    NUTRITION RELATED PHYSICAL FINDINGS:     1. Body Fat/Muscle Mass: no wasting noted      2. Fluid Accumulation:  RUE, LUE, right and left hands non pitting edema documented by RN.    NUTRITION PRESCRIPTION:   57.2 kg Actual Body Weight  Calories: 1258 - 1430 calories/day (22-25 kcal/kg)  Protein: 63 - 69 grams protein/day ( 1.1 - 1.2  grams protein per kg)  Fluid: ~1 ml/kcal or per MD discretion    NUTRITION DIAGNOSIS/PROBLEM:  Inadequate oral intake related to insufficient appetite resulting in inadequate nutrition intake as evidenced by documented/reported insufficient oral intake      MONITOR AND EVALUATE/NUTRITION GOALS:  PO intake of 75% of meals TID - Not met, Continues  PO intake of 75% of oral nutrition supplement/s - Not met, Continues  Weight stable within 1 to 2 lbs during admission - Ongoing      MEDICATIONS:  Novolog ssi  Gtt: Sodium bicarb in D5 @ 75mL/hr (~306 kcals in 24hrs)      LABS:  B, CO2: 20, BUN: 91, creatinine: 4.56    Pt is at Moderate nutrition risk    Sarahi García RDN, LDN, [facility]  Clinical Dietitian   [id number]

## 2025-04-17 NOTE — PROGRESS NOTES
[facility]   part of [facility]erg[facility] with [facility]     Nephrology Progress Note    Ann Herndon Patient Status:  Inpatient    1925 MRN [id number]   Location [facility] 4NW-A Attending Manuel Chan MD   Hosp Day # 4 PCP Cory Sanchez DO       SUBJECTIVE:  Remains quite lethargic        Physical Exam:   /33   Pulse 54   Temp 98.5 °F (36.9 °C) (Axillary)   Resp 18   Ht 4' 9\" (1.448 m)   Wt 126 lb (57.2 kg)   LMP  (LMP Unknown)   SpO2 98%   BMI 27.27 kg/m²   Temp (24hrs), Av.2 °F (36.8 °C), Min:98.1 °F (36.7 °C), Max:98.5 °F (36.9 °C)       Intake/Output Summary (Last 24 hours) at 2025 0758  Last data filed at 2025 0541  Gross per 24 hour   Intake 2671 ml   Output --   Net 2671 ml     Last 3 Weights   25 2256 126 lb (57.2 kg)   25 1841 126 lb (57.2 kg)   23 1434 135 lb (61.2 kg)   22 1058 135 lb 2 oz (61.3 kg)   21 1412 132 lb (59.9 kg)   21 1435 133 lb (60.3 kg)     General: asleep but arouses in no apparent distress.  HEENT: No scleral icterus, MMM  Neck: Supple, no EILEEN or thyromegaly  Cardiac: Regular rate and rhythm, S1, S2 normal, no murmur or rub  Lungs: Clear without wheezes, rales, rhonchi.    Abdomen: Soft, non-tender. + bowel sounds, no palpable organomegaly  Extremities: Without clubbing, cyanosis or edema.  Neurologic: moving all extremities  Skin: Warm and dry, no rash        Labs:     Recent Labs   Lab 25  1845 25  0630 04/15/25  0610 25  0658   WBC 11.2* 9.4 6.5 6.6   HGB 10.3* 8.9* 8.0* 8.4*   MCV 95.9 97.1 96.8 95.3   .0 128.0* 112.0* 109.0*       Recent Labs   Lab 25  1845 25  0630 04/15/25  0610 25  0658 25  0551   * 157* 146* 138 136   K 5.4* 5.2* 4.7 4.4 4.3   * 123* 115* 111 105   CO2 22.0 22.0 20.0* 15.0* 20.0*   * 122* 108* 84* 91*   CREATSERUM 5.32* 5.35* 5.05* 4.68* 4.56*   CA 10.7* 10.1 8.4* 7.7* 8.2*   MG  --  3.2* 2.8* 2.5  --    * 167* 68*  80 206*       Recent Labs   Lab 04/13/25  1845   ALT 25   AST 47*   ALB 4.1       Recent Labs   Lab 04/16/25  0528 04/16/25  1303 04/16/25  1603 04/16/25  2042 04/17/25  0509   PGLU 71 205* 256* 210* 187*       Meds:   Current Hospital Medications[1]      Impression/Plan:      1) Acute kidney injury on CKD4: Has long-standing chronic kidney disease stage 4 with baseline serum creatinine of 2.4-2.9 mg/dL in setting of long-standing HTN, diabetic nephropathy and advanced age. Now presenting with acute kidney injury and hypernatremia in setting of worsening mental status and poor PO intake with concomitant diuretic use. JOSE ANTONIO likely 2/2 pre-renal azotemia, improving slowly with IVF and will cont.       2) Hypernatremia: Due to decreased free water intake d/t worsening mental status.resolved with hypotonic fluids.      3) HTN: controlled currently, holding home losartan, hydralazine and torsemide.      4) Hyperkalemia: Due to JOSE ANTONIO and decreased distal sodium delivery. resolved. Continue IVF.      5)  acidosis- improved, cont bicarb gtt      Sukhi Cantrell MD  4/17/2025  7:59 AM           [1]   Current Facility-Administered Medications   Medication Dose Route Frequency    sodium bicarbonate 75 mEq in dextrose 5% 1,075 mL infusion  75 mEq Intravenous Continuous    metoclopramide (Reglan) 5 mg/mL injection 5 mg  5 mg Intravenous Q8H PRN    glucose (Dex4) 15 GM/59ML oral liquid 15 g  15 g Oral Q15 Min PRN    Or    glucose (Glutose) 40% oral gel 15 g  15 g Oral Q15 Min PRN    Or    glucose-vitamin C (Dex-4) chewable tab 4 tablet  4 tablet Oral Q15 Min PRN    Or    dextrose 50% injection 50 mL  50 mL Intravenous Q15 Min PRN    Or    glucose (Dex4) 15 GM/59ML oral liquid 30 g  30 g Oral Q15 Min PRN    Or    glucose (Glutose) 40% oral gel 30 g  30 g Oral Q15 Min PRN    Or    glucose-vitamin C (Dex-4) chewable tab 8 tablet  8 tablet Oral Q15 Min PRN    insulin aspart (NovoLOG) 100 Units/mL FlexPen 1-10 Units  1-10 Units  Subcutaneous TID AC and HS    cefTRIAXone (Rocephin) 1 g in sodium chloride 0.9% 100 mL IVPB-ADDV  1 g Intravenous Q24H    heparin (Porcine) 5000 UNIT/ML injection 5,000 Units  5,000 Units Subcutaneous 2 times per day    acetaminophen (Tylenol Extra Strength) tab 500 mg  500 mg Oral Q4H PRN    polyethylene glycol (PEG 3350) (Miralax) 17 g oral packet 17 g  17 g Oral Daily PRN    sennosides (Senokot) tab 17.2 mg  17.2 mg Oral Nightly PRN    bisacodyl (Dulcolax) 10 MG rectal suppository 10 mg  10 mg Rectal Daily PRN    ondansetron (Zofran) 4 MG/2ML injection 4 mg  4 mg Intravenous Q6H PRN

## 2025-04-17 NOTE — CONSULTS
[facility]   part of [facility]  Palliative Care Initial Consult    Ann Herndon Patient Status:  Inpatient    1925 MRN [id number]   Location [facility] 4NW-A Attending Kentrell Nielsen MD   Hosp Day # 4 PCP Cory Sanchez DO   [id number]     Date of Consult: 25   Today is day #4 of hospital stay.     Palliative care consultation was requested by Dr. Nielsen for evaluation of palliative care needs and support with Goals of care discussion.    Subjective       HPI:  Ann Herndon is a 89 year old female with PMH significant for CKD-4, ?dementia, HTN, DM, HL presented to ED on 2025 with CC of weakness w recent falls at home, poor PO.  Initial workup revealed UTI, JOSE ANTONIO (Ct 5.32), hypernatremia (158), hyperkalemia (5.4), elevated troponin, constipation, L gluteal wound. Head, spine, hip/shoulder imaging neg for acute processes. Hospital course includes renal on consult, wound on consult, bowel regimen, RUE swelling - doppler neg. Received iv hydralazine this AM.    Therapy recommendations are JHOANA, however, family preference for her to return home.   SLP recommends soft/easy to chew solids and thin liquids.      HPI obtained from Epic and pt's daughter.    This is the 1st hospitalization in the past 6 months.    Medical History: obtained from [facility]  Past Medical History[1]  Past Surgical History[2]    Allergies:  Allergies[3]    Medications:   Current Hospital Medications[4]  Prior to Admission Medications[5]    Functional Status History:  ADLs: bathing or showering, dressing, getting in and out of bed or a chair, walking, using the toilet, and eating  - LOW  1 - 3 performance deficits   IADLs: use the phone, shop for groceries, meal preparation, manage medicines, clean living area, use transportation by self, manage money  - LOW  1 - 3 performance deficits   DME:  stair chair  Recurrent falls: Yes    Palliative Care Social History:   Marital Status:   Children:  Yes  Living Situation Prior to Admit: Home w daughter  Does Patient Live Alone: No   Is Patient Confused:  AZEB, not confused at baseline  Occupational History:  deferred      Set as collapsible by default.[6]    Substance History:   reports that she has never smoked. She has never used smokeless tobacco.  reports current alcohol use.  reports no history of drug use.      Spiritual Assessment:   [employer] - [employer]      REVIEW OF SYSTEMS:     I visited Ann with daughter Rubi at bedside.  Ann is lethargic. She responded briefly to question from daughter, stating \"yes\" to pain. Yelling out when BUEs are touched. Unable to keep eyes open or follow commands.    Pain:  presumed pain to BUEs - ttp  Dyspnea: per daughter has not reported  Cough: denied by daughter  Nausea: denied by daughter  Appetite: reduced 2 days PTA  Constipation: on admission, had large BM following suppository  Last BM:    Bowel Movement         4/15/2025  2106             Stool Count Calculated for I/O: 1              OBJECTIVE       Medications:  Current Hospital Medications[7]    Hematology:   Lab Results   Component Value Date    WBC 6.6 04/16/2025    HGB 8.4 (L) 04/16/2025    HCT 28.2 (L) 04/16/2025    .0 (L) 04/16/2025       Chemistry:  Lab Results   Component Value Date    CREATSERUM 4.56 (H) 04/17/2025    BUN 91 (H) 04/17/2025     04/17/2025    K 4.3 04/17/2025     04/17/2025    CO2 20.0 (L) 04/17/2025     (H) 04/17/2025    CA 8.2 (L) 04/17/2025    ALB 4.1 04/13/2025    ALKPHO 76 04/13/2025    BILT 0.4 04/13/2025    TP 7.3 04/13/2025    AST 47 (H) 04/13/2025    ALT 25 04/13/2025    DDIMER 0.82 (H) 11/22/2021    BNP 17 01/12/2013    MG 2.5 04/16/2025    TROP <0.045 03/19/2021       Imaging:  US VENOUS DOPPLER ARM RIGHT - DIAG IMG (CPT=93971)  Result Date: 4/17/2025  CONCLUSION:  No evidence for DVT or superficial thrombophlebitis in the right upper extremity.   LOCATION:  [facility]   Dictated by (CST):  Baltazar Baptiste MD on 4/17/2025 at 7:47 AM     Finalized by (CST): Baltazar Baptiste MD on 4/17/2025 at 7:48 AM         Vital Signs:  Blood pressure (!) 189/47, pulse 55, temperature 98 °F (36.7 °C), temperature source Oral, resp. rate 18, height 4' 9\" (1.448 m), weight 126 lb (57.2 kg), SpO2 96%, not currently breastfeeding.        Physical Exam:     GENERAL: Lethargic. NAD.  BODY HABITUS:  Body mass index is 27.27 kg/m².   RESPIRATORY: no increased effort at rest on 2L NC.  EXTREMITIES: BUE edematous, painful to touch  NEUROLOGIC: AZEB orientation.  SKIN: Warm and dry.     Pre-hospital Palliative Performance Scale: (pt/family reported/per chart review): 60%  Current Palliative Performance Scale:  20%    Palliative Performance Scale   % Ambulation Activity Level Self-Care Intake Consciousness   100 Full Normal Full   Normal Full   90 Full No disease  Normal Full Normal Full   80 Full Some disease  Normal w/effort Full Normal or  Reduced Full   70 Reduced Some disease  Can't perform job Full Normal or   Reduced Full   60 Reduced Significant disease  Can't perform hobby Occasional  Assist Normal or   Reduced Full or confused   50 Mainly sit/lie Extensive Disease  Can't do any work Partial Assist Normal or Reduced Full or confused   40 Mainly in bed Extensive Disease  Can't do any work Mainly Assist Normal or Reduced Full or confused   30 Bedbound Extensive Disease  Can't do any work Max Assist  Total Care Reduced Drowsy/confused   20 Bedbound Extensive Disease  Can't do any work Max Assist  Total Care Minimal Drowsy/confused   10 Bedbound/coma Extensive Disease  Can't do any work  coma  Max Assist  Total Care Mouth care Drowsy or coma   0 Death     Palliative Care Assessment       Goals of Care:      Provided introduction to Palliative Care and reason for consultation to Ann's daughter Rubi. Discussion included the benefits of palliative care to provide extra layer of support to patient/family who wish to  continue curative or restorative medical therapies. Palliative care was differentiated from hospice philosophy and model of care by reviewing the treatment-focus with palliative care, versus comfort-focused care with hospice.   We also discussed that having palliative care support does not limit medical treatment options or decisions.       Palliative Care Services:  1) Usually visit once per 2-3 weeks  2) Perform GOC discussions  3) Follow up on symptom management    Palliative Care criteria:  1) Is not effected by prognostication (can receive palliative care services if prognosis is in hours, days, months, years, decades)  2) May continue life-prolonging measures and treatments  3) Includes treatment of symptoms to improve quality of life while receiving above measures and treatments  4) Consultation services Hospice services:  1) Phone services 24/7 (they will be your 911 at all hours when symptoms flare)  2) Increase visits according to symptoms (more robust than palliative care)    Hospice criteria:  1) Requires less than 6 months prognosis of life by two physicians  2) Must forego life-prolonging measures and treatments  3) Focus on complete and total comfort care  4) Must agree to sign on hospice benefit to receive services       Diagnostic understanding and Prognostic Awareness:  Rubi has excellent understanding of medical history and current POC. She shared that Ann just traveled to [facility] to celebrate her 89th birthday with her family and enjoyed herself and overall did well with travel. Since returning, she was still interacting with family, completing ADLs w minimal support, was up with Rubi's assistance to bathroom, using stair chair to get up/down bedroom to kitchen etc for meals. On Saturday before admission, she ate her pm meal with family and watched a movie. Went up to bed, and on Sunday was lethargic and could not be transferred with 2 person assist so brought to hospital. She showed some  improvement and was still interacting up until yesterday, however, today, she has not interacted much if at all with Rubi, and has not taken any PO. She expressed her concern for appearance of Ann's face, and I am unable to complete neuro exam d/t inability to participate.    We discussed holding off on attempting to offer swabs until mental status improves.    Discussed current workup in process and what the tests could possibly reveal vs possibility that workup is unrevealing. Rubi voiced that d/w PCP have involved that Ann has led a long and healthy life and her expectations are not that she lives forever, but that she has good QOL. For now, workup is within GOC to find out if anything reversible is causing condition change.    Emotional support provided to Rbui. Spiritual care visit offered but declined as family friend from their parish visited and offered the Host this week. She is aware she can request  support 24/7.         Advance Care Planning:    HCPOA:  Document on file.  Healthcare Agent Appointed: Yes  Healthcare Agent's Name: Rubi Herndon  Healthcare Agent's Phone Number: [phone]    Code Status:  Full Code  A voluntary discussion surrounding resuscitation and LST took place with Rubi to confirm current POC. When asked if full treatment accurately reflects her plan at this time, Rubi replied \"it depends\" -- depends on outcome of workup and her overall condition.    Problem List:       Principal Problem:    Hypernatremia  Active Problems:    Hyperkalemia    Urinary tract infection with hematuria, site unspecified    CKD (chronic kidney disease) stage 4, GFR 15-29 ml/min (HCC)    Acute on chronic renal insufficiency    Type 2 MI (myocardial infarction) ([facility])    Dehydration, severe    Acidosis    Palliative care encounter    Counseling regarding advance care planning and goals of care        Palliative Care Recommendations / Plan:           Goals of Care: Discussions are  ongoing.  Continue evaluation and treatment in hopes of identifying and reversing cause of AMS. Daughter aware of ongoing workup today. Case d/w hospitalist.  Will add IV ofirmev prn for pain while unable to take PO    Code Status: Full Code.  D/w daughter who is aware of option to set limitations.    HCPOA: DaughterRubi and document is on file.     Disposition:  pending clinical course.      A total of 60 minutes were spent on this consult, which included all of the following:  Chart review, direct face to face contact, history taking, physical examination, counseling and coordinating care, and documentation.     I reviewed the above with patient's RN and hospitalist.    Thank you for inviting Palliative Care Service to participate in the care of Ann Herndon and family.       Will follow.      RANDI Ann  Palliative Care    4/17/2025  3:52 PM      The 21st Century Cures Act makes medical notes like these available to patients in the interest of transparency. Please be advised this is a medical document. Medical documents are intended to carry relevant information, facts as evident, and the clinical opinion of the practitioner. The medical note is intended as a peer to peer communication, and may appear blunt or direct. It is written in medical language and may contain abbreviations or verbiage that are unfamiliar.           [1]   Past Medical History:   Anemia    Arthritis    Back problem    herniated discs L4-L5    Blood disorder    anemia    Cellulitis, leg    Dementia (HCC)    Depression    Elevated blood pressure reading without diagnosis of hypertension    Esophageal reflux    Gastritis    GENITO-URINARY DISEASE    Glaucoma    HIGH BLOOD PRESSURE    HIGH CHOLESTEROL    Kidney disease    Lumbar degenerative disc disease    Lumbar radiculitis    Lumbar spondylosis    MADHU (obstructive sleep apnea)    AHI 5 RDI 5 REM AHI 0 Supine AHI 2 non-supine AHI 6     MADHU (obstructive sleep apnea)     Osteoarthrosis, unspecified whether generalized or localized, unspecified site    Other and unspecified hyperlipidemia    Pain in limb    Personal history of urinary (tract) infection    below th eknee    Renal disorder    elevated BUN and creatinine    Shortness of breath    uses oxygen 2.5 L/NC every night for hx of low saturation identified during sleep study 2014    Spinal stenosis    Type II or unspecified type diabetes mellitus without mention of complication, not stated as uncontrolled    Unspecified acute reaction to stress    Unspecified essential hypertension    Visual impairment    glasses   [2]   Past Surgical History:  Procedure Laterality Date    Angiogram  FEB 2013    Angiogram  Feb 2013    Done in [facility]    Appendectomy  1949    Appendectomy      Cataract surgery, complex  08/2010    Cath percutaneous  transluminal coronary angioplasty  2/2012    Normal in [facility]    Colonoscopy N/A 11/15/2016    Procedure: COLONOSCOPY;  Surgeon: Levar Callaway MD;  Location:  ENDOSCOPY    Colonoscopy & polypectomy  11/15/16= Polyp (serrated)    Repeat PRN    Fluor gid & loclzj ndl/cath spi dx/ther njx  8/30/2013    Procedure: LUMBAR EPIDURAL;  Surgeon: Srini Benjamin MD;  Location: [facility]    Fluor gid & loclzj ndl/cath spi dx/ther njx  9/16/2013    Procedure: LUMBAR EPIDURAL;  Surgeon: Srini Benjamin MD;  Location: [facility]    Fluor gid & loclzj ndl/cath spi dx/ther njx  10/8/2013    Procedure: LUMBAR EPIDURAL;  Surgeon: Srini Benjamin MD;  Location: [facility]    Fluor gid & loclzj ndl/cath spi dx/ther njx  6/24/2014    Procedure: ;  Surgeon: Srini Benjamin MD;  Location: [facility]    Fluor gid & loclzj ndl/cath spi dx/ther njx N/A 7/24/2014    Procedure: LUMBAR EPIDURAL;  Surgeon: Srini Benjamin MD;  Location: [facility]    Hysterectomy      Hysterectomy  1974    Injection, w/wo contrast, dx/therapeutic  substance, epidural/subarachnoid; lumbar/sacral  8/30/2013    Procedure: LUMBAR EPIDURAL;  Surgeon: Srini Benjamin MD;  Location: [facility]    Injection, w/wo contrast, dx/therapeutic substance, epidural/subarachnoid; lumbar/sacral  9/16/2013    Procedure: LUMBAR EPIDURAL;  Surgeon: Srini Benjamin MD;  Location: [facility]    Injection, w/wo contrast, dx/therapeutic substance, epidural/subarachnoid; lumbar/sacral  10/8/2013    Procedure: LUMBAR EPIDURAL;  Surgeon: Srini Benjamin MD;  Location: [facility]    Injection, w/wo contrast, dx/therapeutic substance, epidural/subarachnoid; lumbar/sacral N/A 6/24/2014    Procedure: LUMBAR EPIDURAL;  Surgeon: Srini Benjamin MD;  Location: [facility]    Injection, w/wo contrast, dx/therapeutic substance, epidural/subarachnoid; lumbar/sacral N/A 7/24/2014    Procedure: LUMBAR EPIDURAL;  Surgeon: Srini Benjamin MD;  Location: [facility]    M-sedaj by  phys perfrmg svc 5+ yr  8/30/2013    Procedure: LUMBAR EPIDURAL;  Surgeon: Srini Benjamin MD;  Location: [facility]    M-sedaj by  phys perfrmg svc 5+ yr  9/16/2013    Procedure: LUMBAR EPIDURAL;  Surgeon: Srini Benjamin MD;  Location: [facility]    M-sedaj by  phys perfrmg svc 5+ yr  10/8/2013    Procedure: LUMBAR EPIDURAL;  Surgeon: Srini Benjamin MD;  Location: [facility]    M-sedaj by  phys perfrmg svc 5+ yr  6/24/2014    Procedure: ;  Surgeon: Srini Benjamin MD;  Location: [facility]    M-sedaj by  phys perfrmg svc 5+ yr N/A 7/24/2014    Procedure: LUMBAR EPIDURAL;  Surgeon: Srini Benjamin MD;  Location: [facility]    Patient documented not to have experienced any of the following events  8/30/2013    Procedure: LUMBAR EPIDURAL;  Surgeon: Srini Benjamin MD;  Location: [facility]    Patient  documented not to have experienced any of the following events  9/16/2013    Procedure: LUMBAR EPIDURAL;  Surgeon: Srini Benjamin MD;  [facility]    Patient documented not to have experienced any of the following events  10/8/2013    Procedure: LUMBAR EPIDURAL;  Surgeon: Srini Benjamin MD;  [facility]: [facility]    Patient documented not to have experienced any of the following events  6/24/2014    Procedure: ;  Surgeon: Srini Benjamin MD;  [facility]: [facility]    Patient documented not to have experienced any of the following events N/A 7/24/2014    Procedure: LUMBAR EPIDURAL;  Surgeon: Srini Benjamin MD;  [facility]: [facility]    Patient withough preoperative order for iv antibiotic surgical site infection prophylaxis.  8/30/2013    Procedure: LUMBAR EPIDURAL;  Surgeon: Srini Benjamin MD;  Location: [facility]    Patient withough preoperative order for iv antibiotic surgical site infection prophylaxis.  9/16/2013    Procedure: LUMBAR EPIDURAL;  Surgeon: Srini Benjamin MD;  [facility]: [facility]    Patient withough preoperative order for iv antibiotic surgical site infection prophylaxis.  10/8/2013    Procedure: LUMBAR EPIDURAL;  Surgeon: Srini Benjamin MD;  [facility]: [facility]    Patient withough preoperative order for iv antibiotic surgical site infection prophylaxis.  6/24/2014    Procedure: ;  Surgeon: Srini Benjamin MD;  [facility]: [facility]    Patient withough preoperative order for iv antibiotic surgical site infection prophylaxis. N/A 7/24/2014    Procedure: LUMBAR EPIDURAL;  Surgeon: Srini Benjamin MD;  Location: [facility]    Upper gi endoscopy performed  10/7/13   [3]   Allergies  Allergen Reactions    Bee Venom SWELLING   [4]   Current Facility-Administered Medications:     sodium bicarbonate 75 mEq in  dextrose 5% 1,075 mL infusion, 75 mEq, Intravenous, Continuous    hydrALAzine (Apresoline) 20 mg/mL injection 10 mg, 10 mg, Intravenous, Q6H PRN    ceFAZolin (Ancef) 500 mg in sodium chloride 0.9% 50 mL IVPB, 500 mg, Intravenous, Q24H    metoclopramide (Reglan) 5 mg/mL injection 5 mg, 5 mg, Intravenous, Q8H PRN    glucose (Dex4) 15 GM/59ML oral liquid 15 g, 15 g, Oral, Q15 Min PRN **OR** glucose (Glutose) 40% oral gel 15 g, 15 g, Oral, Q15 Min PRN **OR** glucose-vitamin C (Dex-4) chewable tab 4 tablet, 4 tablet, Oral, Q15 Min PRN **OR** dextrose 50% injection 50 mL, 50 mL, Intravenous, Q15 Min PRN **OR** glucose (Dex4) 15 GM/59ML oral liquid 30 g, 30 g, Oral, Q15 Min PRN **OR** glucose (Glutose) 40% oral gel 30 g, 30 g, Oral, Q15 Min PRN **OR** glucose-vitamin C (Dex-4) chewable tab 8 tablet, 8 tablet, Oral, Q15 Min PRN    insulin aspart (NovoLOG) 100 Units/mL FlexPen 1-10 Units, 1-10 Units, Subcutaneous, TID AC and HS    heparin (Porcine) 5000 UNIT/ML injection 5,000 Units, 5,000 Units, Subcutaneous, 2 times per day    acetaminophen (Tylenol Extra Strength) tab 500 mg, 500 mg, Oral, Q4H PRN    polyethylene glycol (PEG 3350) (Miralax) 17 g oral packet 17 g, 17 g, Oral, Daily PRN    sennosides (Senokot) tab 17.2 mg, 17.2 mg, Oral, Nightly PRN    bisacodyl (Dulcolax) 10 MG rectal suppository 10 mg, 10 mg, Rectal, Daily PRN    ondansetron (Zofran) 4 MG/2ML injection 4 mg, 4 mg, Intravenous, Q6H PRN  [5]   No current outpatient medications on file.   [6]   Social History  Socioeconomic History    Marital status:    Tobacco Use    Smoking status: Never    Smokeless tobacco: Never   Vaping Use    Vaping status: Never Used   Substance and Sexual Activity    Alcohol use: Yes     Comment: rarely    Drug use: No   [7]   Current Facility-Administered Medications:     sodium bicarbonate 75 mEq in dextrose 5% 1,075 mL infusion, 75 mEq, Intravenous, Continuous    hydrALAzine (Apresoline) 20 mg/mL injection 10 mg, 10 mg,  Intravenous, Q6H PRN    ceFAZolin (Ancef) 500 mg in sodium chloride 0.9% 50 mL IVPB, 500 mg, Intravenous, Q24H    metoclopramide (Reglan) 5 mg/mL injection 5 mg, 5 mg, Intravenous, Q8H PRN    glucose (Dex4) 15 GM/59ML oral liquid 15 g, 15 g, Oral, Q15 Min PRN **OR** glucose (Glutose) 40% oral gel 15 g, 15 g, Oral, Q15 Min PRN **OR** glucose-vitamin C (Dex-4) chewable tab 4 tablet, 4 tablet, Oral, Q15 Min PRN **OR** dextrose 50% injection 50 mL, 50 mL, Intravenous, Q15 Min PRN **OR** glucose (Dex4) 15 GM/59ML oral liquid 30 g, 30 g, Oral, Q15 Min PRN **OR** glucose (Glutose) 40% oral gel 30 g, 30 g, Oral, Q15 Min PRN **OR** glucose-vitamin C (Dex-4) chewable tab 8 tablet, 8 tablet, Oral, Q15 Min PRN    insulin aspart (NovoLOG) 100 Units/mL FlexPen 1-10 Units, 1-10 Units, Subcutaneous, TID AC and HS    heparin (Porcine) 5000 UNIT/ML injection 5,000 Units, 5,000 Units, Subcutaneous, 2 times per day    acetaminophen (Tylenol Extra Strength) tab 500 mg, 500 mg, Oral, Q4H PRN    polyethylene glycol (PEG 3350) (Miralax) 17 g oral packet 17 g, 17 g, Oral, Daily PRN    sennosides (Senokot) tab 17.2 mg, 17.2 mg, Oral, Nightly PRN    bisacodyl (Dulcolax) 10 MG rectal suppository 10 mg, 10 mg, Rectal, Daily PRN    ondansetron (Zofran) 4 MG/2ML injection 4 mg, 4 mg, Intravenous, Q6H PRN

## 2025-04-17 NOTE — PLAN OF CARE
Problem: METABOLIC/FLUID AND ELECTROLYTES - ADULT  Goal: Electrolytes maintained within normal limits  Description: INTERVENTIONS:- Monitor labs and rhythm and assess patient for signs and symptoms of electrolyte imbalances- Administer electrolyte replacement as ordered- Monitor response to electrolyte replacements, including rhythm and repeat lab results as appropriate- Fluid restriction as ordered- Instruct patient on fluid and nutrition restrictions as appropriate  Outcome: Progressing  Goal: Glucose maintained within prescribed range  Description: INTERVENTIONS:- Monitor Blood Glucose as ordered- Assess for signs and symptoms of hyperglycemia and hypoglycemia- Administer ordered medications to maintain glucose within target range- Assess barriers to adequate nutritional intake and initiate nutrition consult as needed- Instruct patient on self management of diabetes  Outcome: Progressing     Problem: SAFETY ADULT - FALL  Goal: Free from fall injury  Description: INTERVENTIONS:- Assess pt frequently for physical needs- Identify cognitive and physical deficits and behaviors that affect risk of falls.- [place] fall precautions as indicated by assessment.- Educate pt/family on patient safety including physical limitations- Instruct pt to call for assistance with activity based on assessment- Modify environment to reduce risk of injury- Provide assistive devices as appropriate- Consider OT/PT consult to assist with strengthening/mobility- Encourage toileting schedule  Outcome: Progressing     Problem: GASTROINTESTINAL - ADULT  Goal: Maintains or returns to baseline bowel function  Description: INTERVENTIONS:- Assess bowel function- Maintain adequate hydration with IV or PO as ordered and tolerated- Evaluate effectiveness of GI medications- Encourage mobilization and activity- Obtain nutritional consult as needed- Establish a toileting routine/schedule- Consider collaborating with pharmacy to review patient's  medication profile  Outcome: Progressing  Goal: Achieves appropriate nutritional intake (bariatric)  Description: INTERVENTIONS:- Monitor for over-consumption- Identify factors contributing to increased intake, treat as appropriate- Monitor I&O, WT and lab values- Obtain nutritional consult as needed- Evaluate psychosocial factors contributing to over-consumption  Outcome: Progressing     Problem: SKIN/TISSUE INTEGRITY - ADULT  Goal: Skin integrity remains intact  Description: INTERVENTIONS- Assess and document risk factors for pressure ulcer development- Assess and document skin integrity- Monitor for areas of redness and/or skin breakdown- Initiate interventions, skin care algorithm/standards of care as needed  Outcome: Progressing     Problem: PAIN - ADULT  Goal: Verbalizes/displays adequate comfort level or patient's stated pain goal  Description: INTERVENTIONS:- Encourage pt to monitor pain and request assistance- Assess pain using appropriate pain scale- Administer analgesics based on type and severity of pain and evaluate response- Implement non-pharmacological measures as appropriate and evaluate response- Consider cultural and social influences on pain and pain management- Manage/alleviate anxiety- Utilize distraction and/or relaxation techniques- Monitor for opioid side effects- Notify MD/LIP if interventions unsuccessful or patient reports new pain- Anticipate increased pain with activity and pre-medicate as appropriate  Outcome: Progressing   Patient alert x1. Confused. VSS. Right arm/hand swelling noted. IV removed and MD paged. US ordered to r/o DVT. Still waiting for US to be done.new IV placed on left arm. IV fluids infusing continuous. Abx completed overnight. Pt repositioned. Mepalex on sacrum remained in place. Bed alarm on. Call light within reach.

## 2025-04-17 NOTE — PLAN OF CARE
Received patient lethargic. Alert to self. 2L of oxygen. IVF infusing. MD notified of patient being more drowsy than usual per daughter and left arm swelling. Doppler and CT of head pending. Safety precautions in place. Call light within reach.      Problem: METABOLIC/FLUID AND ELECTROLYTES - ADULT  Goal: Electrolytes maintained within normal limits  Description: INTERVENTIONS:- Monitor labs and rhythm and assess patient for signs and symptoms of electrolyte imbalances- Administer electrolyte replacement as ordered- Monitor response to electrolyte replacements, including rhythm and repeat lab results as appropriate- Fluid restriction as ordered- Instruct patient on fluid and nutrition restrictions as appropriate  4/17/2025 1820 by Brittany Beth, RN  Outcome: Progressing    Goal: Glucose maintained within prescribed range  Description: INTERVENTIONS:- Monitor Blood Glucose as ordered- Assess for signs and symptoms of hyperglycemia and hypoglycemia- Administer ordered medications to maintain glucose within target range- Assess barriers to adequate nutritional intake and initiate nutrition consult as needed- Instruct patient on self management of diabetes  4/17/2025 1820 by Brittany Beth RN  Outcome: Progressing    Problem: SAFETY ADULT - FALL  Goal: Free from fall injury  Description: INTERVENTIONS:- Assess pt frequently for physical needs- Identify cognitive and physical deficits and behaviors that affect risk of falls.- [place] fall precautions as indicated by assessment.- Educate pt/family on patient safety including physical limitations- Instruct pt to call for assistance with activity based on assessment- Modify environment to reduce risk of injury- Provide assistive devices as appropriate- Consider OT/PT consult to assist with strengthening/mobility- Encourage toileting schedule  4/17/2025 1820 by Brittany Beth, RN  Outcome: Progressing    Problem: GASTROINTESTINAL - ADULT  Goal: Maintains  or returns to baseline bowel function  Description: INTERVENTIONS:- Assess bowel function- Maintain adequate hydration with IV or PO as ordered and tolerated- Evaluate effectiveness of GI medications- Encourage mobilization and activity- Obtain nutritional consult as needed- Establish a toileting routine/schedule- Consider collaborating with pharmacy to review patient's medication profile  4/17/2025 1820 by Brittany Beth RN  Outcome: Progressing    Goal: Achieves appropriate nutritional intake (bariatric)  Description: INTERVENTIONS:- Monitor for over-consumption- Identify factors contributing to increased intake, treat as appropriate- Monitor I&O, WT and lab values- Obtain nutritional consult as needed- Evaluate psychosocial factors contributing to over-consumption  4/17/2025 1820 by Brittany Beth RN  Outcome: Progressing    Problem: SKIN/TISSUE INTEGRITY - ADULT  Goal: Skin integrity remains intact  Description: INTERVENTIONS- Assess and document risk factors for pressure ulcer development- Assess and document skin integrity- Monitor for areas of redness and/or skin breakdown- Initiate interventions, skin care algorithm/standards of care as needed  4/17/2025 1820 by Brittany Beth RN  Outcome: Progressing    Problem: PAIN - ADULT  Goal: Verbalizes/displays adequate comfort level or patient's stated pain goal  Description: INTERVENTIONS:- Encourage pt to monitor pain and request assistance- Assess pain using appropriate pain scale- Administer analgesics based on type and severity of pain and evaluate response- Implement non-pharmacological measures as appropriate and evaluate response- Consider cultural and social influences on pain and pain management- Manage/alleviate anxiety- Utilize distraction and/or relaxation techniques- Monitor for opioid side effects- Notify MD/LIP if interventions unsuccessful or patient reports new pain- Anticipate increased pain with activity and pre-medicate as  appropriate  4/17/2025 1820 by Brittany Beth, RN  Outcome: Progressing

## 2025-04-17 NOTE — PROGRESS NOTES
[facility] Hospitalist Progress Note     PCP: Cory Sanchez DO    Chief Complaint: follow-up   Follow up for: The primary encounter diagnosis was Hypernatremia. Diagnoses of Acute on chronic renal insufficiency, Type 2 MI (myocardial infarction) (HCC), Urinary tract infection with hematuria, site unspecified, and Dehydration, severe were also pertinent to this visit.    Overnight/Interim Events:    SUBJECTIVE:  Sitting in bed eating. On 2-3L. MS improving. Feel previously at home.     OBJECTIVE:  Temp:  [98 °F (36.7 °C)-98.3 °F (36.8 °C)] 98.1 °F (36.7 °C)  Pulse:  [54-64] 56  Resp:  [14-18] 17  BP: (117-141)/(35-42) 117/42  SpO2:  [91 %-98 %] 98 %    Intake/Output:    Intake/Output Summary (Last 24 hours) at 4/16/2025 2250  Last data filed at 4/16/2025 1742  Gross per 24 hour   Intake 3671 ml   Output --   Net 3671 ml       Last 3 Weights   04/13/25 2256 126 lb (57.2 kg)   04/13/25 1841 126 lb (57.2 kg)   03/29/23 1434 135 lb (61.2 kg)   07/05/22 1058 135 lb 2 oz (61.3 kg)       Exam    General: Alert, no distress, appears stated age.     Head:  Normocephalic, without obvious abnormality, atraumatic.   Eyes:  Sclera anicteric, EOMs intact.    Nose: Nares normal,  Mucosa normal    Throat: Lips normal   Neck: Supple, symmetrical, trachea midline   Lungs:   Clear to auscultation bilaterally. Normal effort   Chest wall:  No tenderness or deformity   Heart:  Regular rate and rhythm, S1, S2 normal, no murmur, rub or gallop appreciated   Abdomen:   Soft, NT/ND, Bowel sounds normal. No masses,  No organomegaly.    Extremities: Extremities normal, atraumatic, no cyanosis or LE edema.   Skin: Skin color, texture, turgor normal. No rashes or lesions.    Neurologic: Moving all extremities spontaneously, no focal deficit appreciated      Data Review:       Labs:     Recent Labs   Lab 04/13/25  1845 04/14/25  0630 04/15/25  0610 04/16/25  0658   WBC 11.2* 9.4 6.5 6.6   HGB 10.3* 8.9* 8.0* 8.4*   MCV 95.9 97.1 96.8 95.3   .0  128.0* 112.0* 109.0*       Recent Labs   Lab 04/13/25  1845 04/14/25  0630 04/15/25  0610 04/16/25  0658   * 157* 146* 138   K 5.4* 5.2* 4.7 4.4   * 123* 115* 111   CO2 22.0 22.0 20.0* 15.0*   * 122* 108* 84*   CREATSERUM 5.32* 5.35* 5.05* 4.68*   CA 10.7* 10.1 8.4* 7.7*   MG  --  3.2* 2.8* 2.5   * 167* 68* 80       Recent Labs   Lab 04/13/25  1845   ALT 25   AST 47*   ALB 4.1       Recent Labs   Lab 04/15/25  2117 04/16/25  0528 04/16/25  1303 04/16/25  1603 04/16/25  2042   PGLU 106* 71 205* 256* 210*       No results for input(s): \"TROP\" in the last 168 hours.      Meds:     Scheduled Medications[1]  Medication Infusions[2]  PRN Medications[3]       Assessment/Plan:     89 year old female with PMH including but not limited to HTN, CKD, DM, HL who p/t [facility] ED c weakness and hyperNa.      Weakness  FTT  Prior Falls   - likely 2/2 hyperNa and dementia and UTI  - PT/OT/SLP  - follow PO intake  - CTH chronic small vessel ischemic change   - CT spine multilevel degenerative disc disease in the cervical spine   - XR hip/shoulder no fx  - CXR noted, follow clinical sxs         Abnormal UA, UTI  - IV CTX  - Ucx >100K EColi  - await s/s     Acute kidney injury on CKD4:   - B/l creatinine of 2.4-2.9 mg/dL in setting of long-standing HTN, diabetic nephropathy and advanced age. follows c Dr. Manzano   - JOSE ANTONIO likely 2/2 pre-renal azotemia  - apprec recs, IVF, improving   - avoid nephrotoxins  - CT bladder wall thickening, no renal or ureteral stones.      Hypernatremia, now resolved   On hypotonic fluid per renal  - follow BMP     Hyperkalemia   - per renal, IVF      HTN  - holding arb/toresemide      # constipation  bowel regimen     # Type 2 diabetes mellitus, controlled A1c 6.0   -Hyperglycemic protocol with accu-checks QID and low-dose sliding scale insulin. Will keep 1800 ADA diet. Will hold PO meds  - stressed imporatnce of BG control and compliance c meds to prevent long-term cardiac, vascular,  neuro, renal complications      # Hypoglycemia  - protocol, likely dec PO intake      # Proph  - sqh     GOC    Advanced Care Planning  Discussed goals of care with the patient and her dtr, voluntarily participated in an advanced care planning discussion. The following was discussed: GOC, level of aggressive medical care. Full Code for now, can re-assess as needed pending clinical situation.     25 minutes were spent in discussing advanced care planning. This time was exclusive of the documented time for this visit.       Dispo: med  -Lives c dtr        Questions/concerns were discussed with patient and dtr by bedside.     Total Time spent with patient and coordinating care:  55 minutes    Kentrell Nielsen MD  [facility]  [phone]  4/16/2025  10:53 PM             [1]    insulin aspart  1-10 Units Subcutaneous TID AC and HS    cefTRIAXone  1 g Intravenous Q24H    heparin  5,000 Units Subcutaneous 2 times per day   [2]    sodium bicarbonate in D5W infusion 75 mEq (04/16/25 1906)   [3]   metoclopramide    glucose **OR** glucose **OR** glucose-vitamin C **OR** dextrose **OR** glucose **OR** glucose **OR** glucose-vitamin C    acetaminophen    polyethylene glycol (PEG 3350)    sennosides    bisacodyl    ondansetron

## 2025-04-18 ENCOUNTER — NURSE ONLY (OUTPATIENT)
Dept: ELECTROPHYSIOLOGY | Facility: HOSPITAL | Age: OVER 89
End: 2025-04-18
Attending: Other
Payer: MEDICARE

## 2025-04-18 PROBLEM — G93.40 ENCEPHALOPATHY ACUTE: Status: ACTIVE | Noted: 2025-04-18

## 2025-04-18 LAB
ANION GAP SERPL CALC-SCNC: 11 MMOL/L (ref 0–18)
BUN BLD-MCNC: 84 MG/DL (ref 9–23)
CALCIUM BLD-MCNC: 8.2 MG/DL (ref 8.7–10.6)
CHLORIDE SERPL-SCNC: 104 MMOL/L (ref 98–112)
CK SERPL-CCNC: 86 U/L (ref 34–145)
CO2 SERPL-SCNC: 22 MMOL/L (ref 21–32)
CREAT BLD-MCNC: 4.77 MG/DL (ref 0.55–1.02)
EGFRCR SERPLBLD CKD-EPI 2021: 8 ML/MIN/1.73M2 (ref 60–?)
ERYTHROCYTE [DISTWIDTH] IN BLOOD BY AUTOMATED COUNT: 12.6 %
ERYTHROCYTE [SEDIMENTATION RATE] IN BLOOD: 88 MM/HR (ref 0–30)
GLUCOSE BLD-MCNC: 129 MG/DL (ref 70–99)
GLUCOSE BLD-MCNC: 156 MG/DL (ref 70–99)
GLUCOSE BLD-MCNC: 164 MG/DL (ref 70–99)
GLUCOSE BLD-MCNC: 172 MG/DL (ref 70–99)
GLUCOSE BLD-MCNC: 231 MG/DL (ref 70–99)
HCT VFR BLD AUTO: 23.7 % (ref 35–48)
HGB BLD-MCNC: 7.4 G/DL (ref 12–16)
MAGNESIUM SERPL-MCNC: 2.4 MG/DL (ref 1.6–2.6)
MCH RBC QN AUTO: 28.1 PG (ref 26–34)
MCHC RBC AUTO-ENTMCNC: 31.2 G/DL (ref 31–37)
MCV RBC AUTO: 90.1 FL (ref 80–100)
OSMOLALITY SERPL CALC.SUM OF ELEC: 311 MOSM/KG (ref 275–295)
PLATELET # BLD AUTO: 96 10(3)UL (ref 150–450)
PLATELETS.RETICULATED NFR BLD AUTO: 9 % (ref 0–7)
POTASSIUM SERPL-SCNC: 3.9 MMOL/L (ref 3.5–5.1)
PROCALCITONIN SERPL-MCNC: 0.8 NG/ML (ref ?–0.05)
RBC # BLD AUTO: 2.63 X10(6)UL (ref 3.8–5.3)
SODIUM SERPL-SCNC: 137 MMOL/L (ref 136–145)
WBC # BLD AUTO: 7.9 X10(3) UL (ref 4–11)

## 2025-04-18 PROCEDURE — 99233 SBSQ HOSP IP/OBS HIGH 50: CPT | Performed by: INTERNAL MEDICINE

## 2025-04-18 PROCEDURE — 95816 EEG AWAKE AND DROWSY: CPT | Performed by: OTHER

## 2025-04-18 PROCEDURE — 99223 1ST HOSP IP/OBS HIGH 75: CPT | Performed by: OTHER

## 2025-04-18 PROCEDURE — 99232 SBSQ HOSP IP/OBS MODERATE 35: CPT | Performed by: NURSE PRACTITIONER

## 2025-04-18 RX ORDER — MUSCLE RUB CREAM 100; 150 MG/G; MG/G
CREAM TOPICAL 3 TIMES DAILY PRN
Status: DISCONTINUED | OUTPATIENT
Start: 2025-04-18 | End: 2025-04-23

## 2025-04-18 RX ORDER — BUMETANIDE 0.25 MG/ML
2 INJECTION, SOLUTION INTRAMUSCULAR; INTRAVENOUS
Status: COMPLETED | OUTPATIENT
Start: 2025-04-18 | End: 2025-04-19

## 2025-04-18 NOTE — PROGRESS NOTES
[facility]   part of [facility]     Nephrology Progress Note    Ann Herndon Patient Status:  Inpatient    1925 MRN [id number]   Location [facility]NW-A Attending Manuel Chan MD   Hosp Day # 5 PCP Cory Sanchez DO       SUBJECTIVE:  Was very lethargic yest, almost no oral intake.  A bit more alert today.  D/w dtr who is a bedside        Physical Exam:   BP (!) 169/45 (BP Location: Right leg)   Pulse 67   Temp 97.5 °F (36.4 °C) (Temporal)   Resp 19   Ht 4' 9\" (1.448 m)   Wt 126 lb (57.2 kg)   LMP  (LMP Unknown)   SpO2 99%   BMI 27.27 kg/m²   Temp (24hrs), Av.3 °F (36.8 °C), Min:97.4 °F (36.3 °C), Max:100.2 °F (37.9 °C)       Intake/Output Summary (Last 24 hours) at 2025 1048  Last data filed at 2025 1212  Gross per 24 hour   Intake 100 ml   Output --   Net 100 ml     Last 3 Weights   25 2256 126 lb (57.2 kg)   25 1841 126 lb (57.2 kg)   23 1434 135 lb (61.2 kg)   22 1058 135 lb 2 oz (61.3 kg)   21 1412 132 lb (59.9 kg)   21 1435 133 lb (60.3 kg)     General: asleep but arouses in no apparent distress.  HEENT: No scleral icterus, MMM  Neck: Supple, no EILEEN or thyromegaly  Cardiac: Regular rate and rhythm, S1, S2 normal, no murmur or rub  Lungs: Clear without wheezes, rales, rhonchi.    Abdomen: Soft, non-tender. + bowel sounds, no palpable organomegaly  Extremities: + generalized edema.  Neurologic: moving all extremities  Skin: Warm and dry, no rash        Labs:     Recent Labs   Lab 25  1845 25  0630 04/15/25  0610 25  0658 25  0628   WBC 11.2* 9.4 6.5 6.6 7.9   HGB 10.3* 8.9* 8.0* 8.4* 7.4*   MCV 95.9 97.1 96.8 95.3 90.1   .0 128.0* 112.0* 109.0* 96.0*       Recent Labs   Lab 25  0630 04/15/25  0610 25  0658 25  0551 25  0628   * 146* 138 136 137   K 5.2* 4.7 4.4 4.3 3.9   * 115* 111 105 104   CO2 22.0 20.0* 15.0* 20.0* 22.0   * 108* 84* 91* 84*    CREATSERUM 5.35* 5.05* 4.68* 4.56* 4.77*   CA 10.1 8.4* 7.7* 8.2* 8.2*   MG 3.2* 2.8* 2.5  --  2.4   * 68* 80 206* 129*       Recent Labs   Lab 04/13/25  1845   ALT 25   AST 47*   ALB 4.1       Recent Labs   Lab 04/17/25  0509 04/17/25  1135 04/17/25  1635 04/17/25 2027 04/18/25  0509   PGLU 187* 234* 243* 201* 172*       Meds:   Current Hospital Medications[1]      Impression/Plan:      1) Acute kidney injury on CKD4: Has long-standing chronic kidney disease stage 4 with baseline serum creatinine of 2.4-2.9 mg/dL in setting of long-standing HTN, diabetic nephropathy and advanced age. Now presenting with acute kidney injury and hypernatremia in setting of worsening mental status and poor PO intake with concomitant diuretic use. JOSE ANTONIO likely 2/2 pre-renal azotemia, had been improving slowly with IVF although stagnant past few days.  Now with improved po intake and development of edema will stop IVF.  Dose with IV bumex today  No other workup planned.  Imaging with no hydro.  Pt not a dialysis candidate.       2) Hypernatremia: Due to decreased free water intake d/t worsening mental status.resolved with hypotonic fluids.      3) HTN: controlled currently, holding home losartan, hydralazine and torsemide.      4) Hyperkalemia: Due to JOSE ANTONIO and decreased distal sodium delivery. resolved. Continue IVF.      5)  acidosis- improved, stop bicarb gtt      Sukhi Cantrell MD  4/18/2025  11:03 AM             [1]   Current Facility-Administered Medications   Medication Dose Route Frequency    sodium bicarbonate 75 mEq in dextrose 5% 1,075 mL infusion  75 mEq Intravenous Continuous    hydrALAzine (Apresoline) 20 mg/mL injection 10 mg  10 mg Intravenous Q6H PRN    ceFAZolin (Ancef) 500 mg in sodium chloride 0.9% 50 mL IVPB  500 mg Intravenous Q24H    acetaminophen (Ofirmev) 10 mg/mL infusion premix 1,000 mg  1,000 mg Intravenous Q6H PRN    metoclopramide (Reglan) 5 mg/mL injection 5 mg  5 mg Intravenous Q8H PRN     glucose (Dex4) 15 GM/59ML oral liquid 15 g  15 g Oral Q15 Min PRN    Or    glucose (Glutose) 40% oral gel 15 g  15 g Oral Q15 Min PRN    Or    glucose-vitamin C (Dex-4) chewable tab 4 tablet  4 tablet Oral Q15 Min PRN    Or    dextrose 50% injection 50 mL  50 mL Intravenous Q15 Min PRN    Or    glucose (Dex4) 15 GM/59ML oral liquid 30 g  30 g Oral Q15 Min PRN    Or    glucose (Glutose) 40% oral gel 30 g  30 g Oral Q15 Min PRN    Or    glucose-vitamin C (Dex-4) chewable tab 8 tablet  8 tablet Oral Q15 Min PRN    insulin aspart (NovoLOG) 100 Units/mL FlexPen 1-10 Units  1-10 Units Subcutaneous TID AC and HS    heparin (Porcine) 5000 UNIT/ML injection 5,000 Units  5,000 Units Subcutaneous 2 times per day    acetaminophen (Tylenol Extra Strength) tab 500 mg  500 mg Oral Q4H PRN    polyethylene glycol (PEG 3350) (Miralax) 17 g oral packet 17 g  17 g Oral Daily PRN    sennosides (Senokot) tab 17.2 mg  17.2 mg Oral Nightly PRN    bisacodyl (Dulcolax) 10 MG rectal suppository 10 mg  10 mg Rectal Daily PRN    ondansetron (Zofran) 4 MG/2ML injection 4 mg  4 mg Intravenous Q6H PRN

## 2025-04-18 NOTE — PLAN OF CARE
Patient is more alert today. Oriented to self, confused. 1L of oxygen. Tylenol given for pain. Medications given per MAR. IVF discontinued. EEG completed. Safety precautions in place. Call light within reach.      Problem: METABOLIC/FLUID AND ELECTROLYTES - ADULT  Goal: Electrolytes maintained within normal limits  Description: INTERVENTIONS:- Monitor labs and rhythm and assess patient for signs and symptoms of electrolyte imbalances- Administer electrolyte replacement as ordered- Monitor response to electrolyte replacements, including rhythm and repeat lab results as appropriate- Fluid restriction as ordered- Instruct patient on fluid and nutrition restrictions as appropriate  Outcome: Progressing  Goal: Glucose maintained within prescribed range  Description: INTERVENTIONS:- Monitor Blood Glucose as ordered- Assess for signs and symptoms of hyperglycemia and hypoglycemia- Administer ordered medications to maintain glucose within target range- Assess barriers to adequate nutritional intake and initiate nutrition consult as needed- Instruct patient on self management of diabetes  Outcome: Progressing     Problem: SAFETY ADULT - FALL  Goal: Free from fall injury  Description: INTERVENTIONS:- Assess pt frequently for physical needs- Identify cognitive and physical deficits and behaviors that affect risk of falls.- [place] fall precautions as indicated by assessment.- Educate pt/family on patient safety including physical limitations- Instruct pt to call for assistance with activity based on assessment- Modify environment to reduce risk of injury- Provide assistive devices as appropriate- Consider OT/PT consult to assist with strengthening/mobility- Encourage toileting schedule  Outcome: Progressing     Problem: GASTROINTESTINAL - ADULT  Goal: Maintains or returns to baseline bowel function  Description: INTERVENTIONS:- Assess bowel function- Maintain adequate hydration with IV or PO as ordered and tolerated-  Evaluate effectiveness of GI medications- Encourage mobilization and activity- Obtain nutritional consult as needed- Establish a toileting routine/schedule- Consider collaborating with pharmacy to review patient's medication profile  Outcome: Progressing  Goal: Achieves appropriate nutritional intake (bariatric)  Description: INTERVENTIONS:- Monitor for over-consumption- Identify factors contributing to increased intake, treat as appropriate- Monitor I&O, WT and lab values- Obtain nutritional consult as needed- Evaluate psychosocial factors contributing to over-consumption  Outcome: Progressing     Problem: SKIN/TISSUE INTEGRITY - ADULT  Goal: Skin integrity remains intact  Description: INTERVENTIONS- Assess and document risk factors for pressure ulcer development- Assess and document skin integrity- Monitor for areas of redness and/or skin breakdown- Initiate interventions, skin care algorithm/standards of care as needed  Outcome: Progressing     Problem: PAIN - ADULT  Goal: Verbalizes/displays adequate comfort level or patient's stated pain goal  Description: INTERVENTIONS:- Encourage pt to monitor pain and request assistance- Assess pain using appropriate pain scale- Administer analgesics based on type and severity of pain and evaluate response- Implement non-pharmacological measures as appropriate and evaluate response- Consider cultural and social influences on pain and pain management- Manage/alleviate anxiety- Utilize distraction and/or relaxation techniques- Monitor for opioid side effects- Notify MD/LIP if interventions unsuccessful or patient reports new pain- Anticipate increased pain with activity and pre-medicate as appropriate  Outcome: Progressing

## 2025-04-18 NOTE — CM/SW NOTE
Met with dtr at bedside who still unsure if she wants HH/PT.     Arm swollen, IVF stopped and IV bumex given.  EEG complete. PC for GOC. Plan for discharge this weekend.     VIVIANA Foster RN, CM  X [phone]

## 2025-04-18 NOTE — CONSULTS
[facility]   NEUROLOGY   CONSULT NOTE    Admission date: 4/13/2025  Reason for Consult: Altered mental status.  Chief Complaint:   Chief Complaint   Patient presents with    Altered Mental Status   ________________________________________________________________    History     History of Presenting Illness  89 year old female multiple medical problems reported below, had a recent fall almost 3 weeks ago with resultant pain in the right upper and lower extremities, mostly on movement, was noted to be more confused and disoriented with having some difficulty with arousal yesterday and neurology was consulted.  Patient is currently awake, responsive, answering questions with the help of her daughter since she does not speak English well.  Complaining of pain in right upper extremity.  Daughter feels that patient is cognitively back to her baseline.  Patient is following simple commands.  No witnessed episode of seizure, tongue biting, jerking most of the upper or lower extremity.    History obtained from patient, daughter, chart review, nursing staff.    Past Medical History[1]  Past Surgical History[2]  Short Social Hx on File[3]  Family History[4]  Allergies Allergies[5]    Home Meds  Current Outpatient Medications   Medication Instructions    atorvastatin 10 MG Oral Tab No dose, route, or frequency recorded.    Cholecalciferol (VITAMIN D3) 25 MCG (1000 UT) Oral Cap 1 tablet, Daily    diclofenac 1 % External Gel No dose, route, or frequency recorded.    ferrous sulfate 325 mg, Daily with breakfast    gabapentin (NEURONTIN) 100 mg, Oral, 3 times daily    gabapentin 100 MG Oral Cap No dose, route, or frequency recorded.    GLIMEPIRIDE 1 MG Oral Tab TAKE 1 TABLET(1 MG) BY MOUTH DAILY WITH BREAKFAST    glimepiride 1 MG Oral Tab No dose, route, or frequency recorded.    hydrALAZINE (APRESOLINE) 50 mg, Oral, 3 times daily    hydrALAZINE 50 MG Oral Tab No dose, route, or frequency recorded.    LOSARTAN  50 MG Oral Tab TAKE 1 TABLET(50 MG) BY MOUTH DAILY    losartan 50 MG Oral Tab No dose, route, or frequency recorded.    Multiple Vitamins-Minerals (PRESERVISION AREDS) Oral Tab 1 tablet, 2 times daily    Multiple Vitamins-Minerals (TAB-A-OMI) Oral Tab 1 tablet, Daily    mupirocin 2 % External Ointment No dose, route, or frequency recorded.    SERTRALINE 100 MG Oral Tab TAKE 1 TABLET(100 MG) BY MOUTH DAILY    sertraline 100 MG Oral Tab No dose, route, or frequency recorded.    torsemide (DEMADEX) 20 mg, Oral, As needed    torsemide 20 MG Oral Tab No dose, route, or frequency recorded.     Scheduled Meds:Scheduled Medications[6]  Continuous Infusions:Medication Infusions[7]  PRN Meds:PRN Medications[8]    OBJECTIVE   VITAL SIGNS:   Temp:  [97.4 °F (36.3 °C)-100.2 °F (37.9 °C)] 98.8 °F (37.1 °C)  Pulse:  [58-70] 58  Resp:  [17-19] 19  BP: (133-172)/(37-56) 135/37  SpO2:  [94 %-100 %] 94 %    PHYSICAL EXAM:    NEUROLOGIC:    Mental Status:  A&O x 3, Follows simple commands, no obvious aphasia or dysarthria  Cranial nerves: PERRL.  Visual fields full.  EOMI.  Face symmetric with normal movement bilaterally.  Hearing grossly intact. Tongue midline with normal movements.   Motor: Motor examination limited because of pain in bilateral upper and lower extremities, mainly right upper extremity.  Swelling also noted in distal extremities.  Strength antigravity in all 4 extremities.  Reflexes decreased.    Sensation: Decreased to light touch bilaterally  Cerebellar: Unable to perform Finger-To-Nose test due to pain on movement.      LABORATORY DATA:  Last 24 hour labs were reviewed in detail.  Recent Labs   Lab 04/16/25 0658 04/17/25  0551 04/18/25  0628    136 137   K 4.4 4.3 3.9    105 104   CO2 15.0* 20.0* 22.0   GLU 80 206* 129*   BUN 84* 91* 84*   CREATSERUM 4.68* 4.56* 4.77*     Recent Labs   Lab 04/15/25  0610 04/16/25 0658 04/18/25 0628   WBC 6.5 6.6 7.9   HGB 8.0* 8.4* 7.4*   .0* 109.0* 96.0*      Recent Labs   Lab 04/13/25  1845   ALT 25   AST 47*     Recent Labs   Lab 04/15/25  0610 04/16/25  0658 04/18/25  0628   MG 2.8* 2.5 2.4     Last A1c value was 6% done 4/13/2025.     Radiology:    CT BRAIN OR HEAD (CPT=70450)  Result Date: 4/17/2025  CONCLUSION:  No acute intracranial abnormality identified.  Stable chronic ischemic changes as above. If there is clinical concern for acute ischemia/infarction, an MRI of the brain would be recommended for further evaluation.  LOCATION:  [facility]   Dictated by (CST): Fernando Hughes MD on 4/17/2025 at 7:47 PM     Finalized by (CST): Fernando Hughes MD on 4/17/2025 at 7:50 PM       XR CHEST AP PORTABLE  (CPT=71045)  Result Date: 4/17/2025  CONCLUSION:  Airspace opacity at the left lung base with small left pleural effusion.  This could represent atelectasis or pneumonia.   LOCATION:  [facility]      Dictated by (CST): Agustin Obrien MD on 4/17/2025 at 3:00 PM     Finalized by (CST): Agustin Obrien MD on 4/17/2025 at 3:00 PM       ASSESSMENT/PLAN   89 year old female with:    Encephalopathy, most likely due to toxic/metabolic etiology.  Patient apparently much better today compared to yesterday.  According to the daughter patient's cognitive function is at her baseline.  He noted mild diffuse slowing consistent with encephalopathy.  CT scan of the brain did not show any acute intracranial abnormality.  Continue patient monitoring.  Please feel free to call for further queries.  No further intervention at this time.  Generalized pain and weakness.  Symptoms has been persistent after the fall.  Finding seems less likely to be due to generalized myopathy/myositis.  Recommend ESR and CPK.  If elevated consider rheumatology consult.  Case discussed with daughter in detail.  All questions answered.    Principal Problem:    Hypernatremia  Active Problems:    Hyperkalemia    Urinary tract infection with hematuria, site unspecified    CKD (chronic kidney disease) stage 4, GFR 15-29  ml/min (HCC)    Acute on chronic renal insufficiency    Type 2 MI (myocardial infarction) (HCC)    Dehydration, severe    Acidosis    Palliative care encounter    Counseling regarding advance care planning and goals of care       Justin Camp MD  Neurohospitalist  [facility]    Disclaimer: This record was dictated using Dragon software. There may be errors due to voice recognition problems that were not realized and corrected during the completion of the note.           [1]   Past Medical History:   Anemia    Arthritis    Back problem    herniated discs L4-L5    Blood disorder    anemia    Cellulitis, leg    Dementia (HCC)    Depression    Elevated blood pressure reading without diagnosis of hypertension    Esophageal reflux    Gastritis    GENITO-URINARY DISEASE    Glaucoma    HIGH BLOOD PRESSURE    HIGH CHOLESTEROL    Kidney disease    Lumbar degenerative disc disease    Lumbar radiculitis    Lumbar spondylosis    MADHU (obstructive sleep apnea)    AHI 5 RDI 5 REM AHI 0 Supine AHI 2 non-supine AHI 6     MADHU (obstructive sleep apnea)    Osteoarthrosis, unspecified whether generalized or localized, unspecified site    Other and unspecified hyperlipidemia    Pain in limb    Personal history of urinary (tract) infection    below th eknee    Renal disorder    elevated BUN and creatinine    Shortness of breath    uses oxygen 2.5 L/NC every night for hx of low saturation identified during sleep study 2014    Spinal stenosis    Type II or unspecified type diabetes mellitus without mention of complication, not stated as uncontrolled    Unspecified acute reaction to stress    Unspecified essential hypertension    Visual impairment    glasses   [2]   Past Surgical History:  Procedure Laterality Date    Angiogram  FEB 2013    Angiogram  Feb 2013    Done in [facility]    Appendectomy  1949    Appendectomy      Cataract surgery, complex  08/2010    Cath percutaneous  transluminal coronary angioplasty  2/2012     Normal in [facility]    Colonoscopy N/A 11/15/2016    Procedure: COLONOSCOPY;  Surgeon: Levar Callaway MD;  Location:  ENDOSCOPY    Colonoscopy & polypectomy  11/15/16= Polyp (serrated)    Repeat PRN    Fluor gid & loclzj ndl/cath spi dx/ther njx  8/30/2013    Procedure: LUMBAR EPIDURAL;  Surgeon: Srini Benjamin MD;  Location: [facility]    Fluor gid & loclzj ndl/cath spi dx/ther njx  9/16/2013    Procedure: LUMBAR EPIDURAL;  Surgeon: Srini Benjamin MD;  Location: [facility]    Fluor gid & loclzj ndl/cath spi dx/ther njx  10/8/2013    Procedure: LUMBAR EPIDURAL;  Surgeon: Srini Benjamin MD;  Location: [facility]    Fluor gid & loclzj ndl/cath spi dx/ther njx  6/24/2014    Procedure: ;  Surgeon: Srini Benjamin MD;  Location: [facility]    Fluor gid & loclzj ndl/cath spi dx/ther njx N/A 7/24/2014    Procedure: LUMBAR EPIDURAL;  Surgeon: Srini Benjamin MD;  Location: [facility]    Hysterectomy      Hysterectomy  1974    Injection, w/wo contrast, dx/therapeutic substance, epidural/subarachnoid; lumbar/sacral  8/30/2013    Procedure: LUMBAR EPIDURAL;  Surgeon: Srini Benjamin MD;  Location: [facility]    Injection, w/wo contrast, dx/therapeutic substance, epidural/subarachnoid; lumbar/sacral  9/16/2013    Procedure: LUMBAR EPIDURAL;  Surgeon: Srini Benjamin MD;  Location: [facility]    Injection, w/wo contrast, dx/therapeutic substance, epidural/subarachnoid; lumbar/sacral  10/8/2013    Procedure: LUMBAR EPIDURAL;  Surgeon: Srini Benjamin MD;  Location: [facility]    Injection, w/wo contrast, dx/therapeutic substance, epidural/subarachnoid; lumbar/sacral N/A 6/24/2014    Procedure: LUMBAR EPIDURAL;  Surgeon: Srini Benjamin MD;  Location: [facility]    Injection, w/wo contrast, dx/therapeutic substance, epidural/subarachnoid;  lumbar/sacral N/A 7/24/2014    Procedure: LUMBAR EPIDURAL;  Surgeon: Srini Benjamin MD;  Location: [facility]    M-sedaj by  phys perfrmg svc 5+ yr  8/30/2013    Procedure: LUMBAR EPIDURAL;  Surgeon: Srini Benjamin MD;  Location: [facility]    M-sedaj by  phys perfrmg svc 5+ yr  9/16/2013    Procedure: LUMBAR EPIDURAL;  Surgeon: Srini Benjamin MD;  Location: [facility]    M-sedaj by  phys perfrmg svc 5+ yr  10/8/2013    Procedure: LUMBAR EPIDURAL;  Surgeon: Srini Benjamin MD;  Location: [facility]    M-sedaj by  phys perfrmg svc 5+ yr  6/24/2014    Procedure: ;  Surgeon: Srini Benjamin MD;  Location: [facility]    M-sedaj by  phys perfrmg svc 5+ yr N/A 7/24/2014    Procedure: LUMBAR EPIDURAL;  Surgeon: Srini Benjamin MD;  [facility]    Patient documented not to have experienced any of the following events  8/30/2013    Procedure: LUMBAR EPIDURAL;  Surgeon: Srini Benjamin MD;  Location: [facility]    Patient documented not to have experienced any of the following events  9/16/2013    Procedure: LUMBAR EPIDURAL;  Surgeon: Srini Bnejamin MD;  Location: [facility]    Patient documented not to have experienced any of the following events  10/8/2013    Procedure: LUMBAR EPIDURAL;  Surgeon: Srini Benjamin MD;  Location: [facility]    Patient documented not to have experienced any of the following events  6/24/2014    Procedure: ;  Surgeon: Srini Benjamin MD;  Location: [facility]    Patient documented not to have experienced any of the following events N/A 7/24/2014    Procedure: LUMBAR EPIDURAL;  Surgeon: Srini Benjamin MD;  [facility]    Patient withough preoperative order for iv antibiotic surgical site infection prophylaxis.  8/30/2013    Procedure: LUMBAR  EPIDURAL;  Surgeon: Srini Benjamin MD;  Location: [facility]    Patient withough preoperative order for iv antibiotic surgical site infection prophylaxis.  9/16/2013    Procedure: LUMBAR EPIDURAL;  Surgeon: Srini Benjamin MD;  Location: [facility]    Patient withough preoperative order for iv antibiotic surgical site infection prophylaxis.  10/8/2013    Procedure: LUMBAR EPIDURAL;  Surgeon: Srini Benjamin MD;  Location: [facility]    Patient withough preoperative order for iv antibiotic surgical site infection prophylaxis.  6/24/2014    Procedure: ;  Surgeon: Srini Benjamin MD;  Location: [facility]    Patient withough preoperative order for iv antibiotic surgical site infection prophylaxis. N/A 7/24/2014    Procedure: LUMBAR EPIDURAL;  Surgeon: Srini Benjamin MD;  Location: [facility]    Upper gi endoscopy performed  10/7/13   [3]   Social History  Socioeconomic History    Marital status:    Tobacco Use    Smoking status: Never    Smokeless tobacco: Never   Vaping Use    Vaping status: Never Used   Substance and Sexual Activity    Alcohol use: Yes     Comment: rarely    Drug use: No   Other Topics Concern    Caffeine Concern Yes     Comment: decaf coffee but 2 cups regular tea    Exercise No    Seat Belt Yes   Social History Narrative    ** Merged History Encounter **          Social Drivers of Health     Food Insecurity: No Food Insecurity (4/13/2025)    NCSS - Food Insecurity     Worried About Running Out of Food in the Last Year: No     Ran Out of Food in the Last Year: No   Transportation Needs: No Transportation Needs (4/13/2025)    NCSS - Transportation     Lack of Transportation: No   Housing Stability: Not At Risk (4/13/2025)    NCSS - Housing/Utilities     Has Housing: Yes     Worried About Losing Housing: No     Unable to Get Utilities: No   [4]   Family History  Problem Relation Age of Onset     Cancer Father         Esophagus (1964)    Diabetes Mother     Hypertension Sister     Obesity Sister     Other (gastric cancer) Sister     Other (malignant pancreatic neoplasm) Sister     Cancer Sister         Colon (2009)    Cancer Sister         Pancreas (2001)   [5]   Allergies  Allergen Reactions    Bee Venom SWELLING   [6]    bumetanide  2 mg Intravenous BID (Diuretic)    ceFAZolin  500 mg Intravenous Q24H    insulin aspart  1-10 Units Subcutaneous TID AC and HS    heparin  5,000 Units Subcutaneous 2 times per day   [7] [8]   menthol/methyl salicylate    hydrALAzine    acetaminophen    metoclopramide    glucose **OR** glucose **OR** glucose-vitamin C **OR** dextrose **OR** glucose **OR** glucose **OR** glucose-vitamin C    acetaminophen    polyethylene glycol (PEG 3350)    sennosides    bisacodyl    ondansetron

## 2025-04-18 NOTE — PROCEDURES
EEG REPORT;    Reason for Examination: [facility]    Technical Summary:   18 Channels of EEG and 1 Channel of EKG was performed utilizing internation 10/20 method.        Background Activity:   The background activity consisted of 8Hz waveforms, reactive to eye opening/ external stimulation.    Abnormality:  Throughout the recording low to medium voltage, polymorphic, 3 to 6 Hz slow activity was noted diffusely over both hemispheres    Activation:    Hyperventilation:   Not Performed.    Photic Stimulation:  Driving response seen.No       Sleep:  Stage I sleep seen.     Impression:  This is an Abnormal EEG. No focal, lateralized or generalized epileptiform activity seen.   Mild diffuse slowing into delta and theta range was noted.  This constellation of findings can be seen in encephalopathy due to metabolic/toxic etiology, medication effects or diffuse cerebral injury.  Clinical correlation is recommended.        Justin Camp MD  [facility].

## 2025-04-18 NOTE — PROGRESS NOTES
[facility] Hospitalist Progress Note     PCP: Cory Sanchez DO    Chief Complaint: follow-up   Follow up for: The primary encounter diagnosis was Hypernatremia. Diagnoses of Acute on chronic renal insufficiency, Type 2 MI (myocardial infarction) (HCC), Urinary tract infection with hematuria, site unspecified, and Dehydration, severe were also pertinent to this visit.    Overnight/Interim Events:    SUBJECTIVE:  Patient seen and examined.  On 1L.   More awake and responsive today per daughter.  Denies CP/SOB.  Arms are swollen.  NAD.     OBJECTIVE:  Temp:  [97.4 °F (36.3 °C)-100.2 °F (37.9 °C)] 97.5 °F (36.4 °C)  Pulse:  [63-70] 67  Resp:  [17-19] 19  BP: (133-172)/(38-56) 169/45  SpO2:  [95 %-100 %] 99 %    Intake/Output:  No intake or output data in the 24 hours ending 04/18/25 1226      Last 3 Weights   04/13/25 2256 126 lb (57.2 kg)   04/13/25 1841 126 lb (57.2 kg)   03/29/23 1434 135 lb (61.2 kg)   07/05/22 1058 135 lb 2 oz (61.3 kg)       Exam    General: no distress, appears stated age.  Awake, nonverbal    Head:  Normocephalic, without obvious abnormality, atraumatic.   Eyes:  Sclera anicteric, EOMs intact.    Nose: Nares normal,  Mucosa normal    Throat: Lips normal   Neck: Supple, symmetrical, trachea midline   Lungs:   Clear to auscultation bilaterally. Normal effort   Chest wall:  No tenderness or deformity   Heart:  Regular rate and rhythm, S1, S2 normal, no murmur, rub or gallop appreciated   Abdomen:   Soft, NT/ND, Bowel sounds normal. No masses,  No organomegaly.    Extremities: Extremities normal, atraumatic, no cyanosis or LE edema.   Skin: Skin color, texture, turgor normal. No rashes or lesions.    Neurologic: Moving all extremities spontaneously, no focal deficit appreciated.   Unable to follow commands for full neuro exam           Data Review:       Labs:     Recent Labs   Lab 04/13/25  1845 04/14/25  0630 04/15/25  0610 04/16/25  0658 04/18/25  0628   WBC 11.2* 9.4 6.5 6.6 7.9   HGB 10.3* 8.9*  Pt states she goes to the methadone clinic and gets 87mg of methadone and would like to get her doses while she is admitted. Trey served MD Vásquez to make aware.       Kim Dai RN  11/14/23 1946       Kim Dai RN  11/14/23 2053 8.0* 8.4* 7.4*   MCV 95.9 97.1 96.8 95.3 90.1   .0 128.0* 112.0* 109.0* 96.0*       Recent Labs   Lab 04/14/25  0630 04/15/25  0610 04/16/25  0658 04/17/25  0551 04/18/25  0628   * 146* 138 136 137   K 5.2* 4.7 4.4 4.3 3.9   * 115* 111 105 104   CO2 22.0 20.0* 15.0* 20.0* 22.0   * 108* 84* 91* 84*   CREATSERUM 5.35* 5.05* 4.68* 4.56* 4.77*   CA 10.1 8.4* 7.7* 8.2* 8.2*   MG 3.2* 2.8* 2.5  --  2.4   * 68* 80 206* 129*       Recent Labs   Lab 04/13/25  1845   ALT 25   AST 47*   ALB 4.1       Recent Labs   Lab 04/17/25  0509 04/17/25  1135 04/17/25  1635 04/17/25  2027 04/18/25  0509   PGLU 187* 234* 243* 201* 172*       No results for input(s): \"TROP\" in the last 168 hours.      Meds:     Scheduled Medications[1]  Medication Infusions[2]  PRN Medications[3]       Assessment/Plan:     89 year old female with PMH including but not limited to HTN, CKD, DM, HL who p/t  ED c weakness and hyperNa.      Weakness  FTT  Prior Falls   Lethargy - improving   - likely 2/2 hyperNa and dementia and UTI and age   - PT/OT/SLP  - follow PO intake  - CTH chronic small vessel ischemic change   - CT spine multilevel degenerative disc disease in the cervical spine   - XR hip/shoulder no fx  - CXR noted, follow clinical sxs   - dtr reports supposed to have MRI next week as o/p   - infection w/u so far negative, procal 0.8, has been on CTX since 4/14  - CXR with LLL atelectasis/pleural effusion   - IV tylenol for pain   - neurology consulted for lethargy, EEG pending      Abnormal UA, UTI  - IV CTX to ancef - plan for 1w course   - Ucx >100K EColi  - pan sens     Acute kidney injury on CKD4:   - B/l creatinine of 2.4-2.9 mg/dL in setting of long-standing HTN, diabetic nephropathy and advanced age. follows [facility]   - JOSE ANTONIO likely 2/2 pre-renal azotemia  - apprec recs, IVF, improving  - dc IVF, plan for diuresis today   - avoid nephrotoxins  - CT bladder wall thickening, no renal or ureteral stones.       Hypernatremia, now resolved   On hypotonic fluid per renal  - follow BMP     Hyperkalemia   - per renal, IVF      HTN c urgency   - holding arb/toresemide  - SBP up to 180s  - prn hydralazine added      # constipation  bowel regimen     # Type 2 diabetes mellitus, controlled A1c 6.0   -Hyperglycemic protocol with accu-checks QID and low-dose sliding scale insulin. Will keep 1800 ADA diet. Will hold PO meds  - stressed imporatnce of BG control and compliance c meds to prevent long-term cardiac, vascular, neuro, renal complications     # B/l UE swelling  - RUE doppler neg  - check LUE doppler    # Hypoglycemia x1 in AM  - protocol, likely dec PO intake   - lower SSI if persists     # GOC  - pallaitive c/s apprec, FC for now, can re-assess as needed pending clinical situation. D/w RANDI Samuel   - 04/18/25 - Advanced care planning - 16 minutes were spent discussing advanced care planning exclusive of the documented time for this visit. D/w daughter, patient nonverbal. For now will maintain full code, she will discuss with her siblings regarding GOC.       # Proph  - sqh        Dispo: med  -Lives c dtr      Lina Cardenas MD  [facility]  Message over [email or web address]  Pager: [phone]         [1]    bumetanide  2 mg Intravenous BID (Diuretic)    ceFAZolin  500 mg Intravenous Q24H    insulin aspart  1-10 Units Subcutaneous TID AC and HS    heparin  5,000 Units Subcutaneous 2 times per day   [2] [3]   menthol/methyl salicylate    hydrALAzine    acetaminophen    metoclopramide    glucose **OR** glucose **OR** glucose-vitamin C **OR** dextrose **OR** glucose **OR** glucose **OR** glucose-vitamin C    acetaminophen    polyethylene glycol (PEG 3350)    sennosides    bisacodyl    ondansetron

## 2025-04-18 NOTE — PROGRESS NOTES
[facility]   part of [facility]  Palliative Care Follow Up    Ann Herndon Patient Status:  Inpatient    1925 MRN [id number]   Location [facility] 4NW-A Attending Eric Cardenas*   Hosp Day # 5 PCP Cory Sanchez,    [id number]    Date of visit:  2025  Day 5 of hospitalization.     Palliative care consult requested by Dr. Nielsen for support with goals of care discussions.     Summary:         HPI:  Ann Herndon is a 89 year old female with PMH significant for CKD-4, ?dementia, HTN, DM, HL presented to ED on 2025 with CC of weakness w recent falls at home, poor PO.  Initial workup revealed UTI, JOSE ANTONIO (Ct 5.32), hypernatremia (158), hyperkalemia (5.4), elevated troponin, constipation, L gluteal wound. Head, spine, hip/shoulder imaging neg for acute processes. Hospital course includes renal on consult, wound on consult, bowel regimen, RUE swelling - doppler neg. Received iv hydralazine this AM.     Therapy recommendations are JHOANA, however, family preference for her to return home.   SLP recommends soft/easy to chew solids and thin liquids.      Interval Events: CT head neg, workup neg    SUBJECTIVE  Review of Systems - Palliative Care Symptom Assessment     I visited Ann with Rubi at bedside. She is more alert today, answering some questions. Remains fatigued and weak. Took most of breakfast tray and has been communicating w Rubi.  Reporting pain in extremities.     Daughter agreeable to po tylenol and theragesic for BLEs. Advised elevation of hands/arms to help w swelling.    OBJECTIVE     Hematology:   Lab Results   Component Value Date    WBC 7.9 2025    HGB 7.4 (L) 2025    HCT 23.7 (L) 2025    PLT 96.0 (L) 2025       Chemistry:  Lab Results   Component Value Date    CREATSERUM 4.77 (H) 2025    BUN 84 (H) 2025     2025    K 3.9 2025     2025    CO2 22.0 2025     (H) 2025     CA 8.2 (L) 04/18/2025    ALB 4.1 04/13/2025    ALKPHO 76 04/13/2025    BILT 0.4 04/13/2025    TP 7.3 04/13/2025    AST 47 (H) 04/13/2025    ALT 25 04/13/2025    DDIMER 0.82 (H) 11/22/2021    BNP 17 01/12/2013    MG 2.4 04/18/2025    TROP <0.045 03/19/2021       Imaging:  CT BRAIN OR HEAD (CPT=70450)  Result Date: 4/17/2025  CONCLUSION:  No acute intracranial abnormality identified.  Stable chronic ischemic changes as above. If there is clinical concern for acute ischemia/infarction, an MRI of the brain would be recommended for further evaluation.  LOCATION:  [facility]   Dictated by (CST): Fernando Hughes MD on 4/17/2025 at 7:47 PM     Finalized by (CST): Fernando Hughes MD on 4/17/2025 at 7:50 PM       XR CHEST AP PORTABLE  (CPT=71045)  Result Date: 4/17/2025  CONCLUSION:  Airspace opacity at the left lung base with small left pleural effusion.  This could represent atelectasis or pneumonia.   LOCATION:  [facility]      Dictated by (CST): Agustin Obrien MD on 4/17/2025 at 3:00 PM     Finalized by (CST): Agustin Obrien MD on 4/17/2025 at 3:00 PM       US VENOUS DOPPLER ARM RIGHT - DIAG IMG (CPT=93971)  Result Date: 4/17/2025  CONCLUSION:  No evidence for DVT or superficial thrombophlebitis in the right upper extremity.   LOCATION:  [facility]   Dictated by (CST): Baltazar Baptiste MD on 4/17/2025 at 7:47 AM     Finalized by (CST): Baltazar Baptiste MD on 4/17/2025 at 7:48 AM         Vital Signs:  Blood pressure (!) 169/45, pulse 67, temperature 97.5 °F (36.4 °C), temperature source Temporal, resp. rate 19, height 4' 9\" (1.448 m), weight 126 lb (57.2 kg), SpO2 99%, not currently breastfeeding.  Body mass index is 27.27 kg/m².      Physical Exam:     GENERAL: Alert though fatigued. NAD.  RESPIRATORY: no increased effort  EXTREMITIES: edematous  NEUROLOGIC: alert, oriented to self.   PSYCHIATRIC:  resting  SKIN: Warm and dry.     Palliative Performance Scale: 30%   Palliative Performance Scale   % Ambulation Activity Level  Self-Care Intake Consciousness   100 Full Normal Full   Normal Full   90 Full No disease  Normal Full Normal Full   80 Full Some disease  Normal w/effort Full Normal or  Reduced Full   70 Reduced Some disease  Can't perform job Full Normal or   Reduced Full   60 Reduced Significant disease  Can't perform hobby Occasional  Assist Normal or   Reduced Full or confused   50 Mainly sit/lie Extensive Disease  Can't do any work Partial Assist Normal or Reduced Full or confused   40 Mainly in bed Extensive Disease  Can't do any work Mainly Assist Normal or Reduced Full or confused   30 Bedbound Extensive Disease  Can't do any work Max Assist  Total Care Reduced Drowsy/confused   20 Bedbound Extensive Disease  Can't do any work Max Assist  Total Care Minimal Drowsy/confused   10 Bedbound/coma Extensive Disease  Can't do any work  coma  Max Assist  Total Care Mouth care Drowsy or coma   0 Death       Palliative Care Assessment:     Goals of Care Discussion:     Rubi is pleased at improved mental status today. Reviewed results of testing completed yesterday.  She hopes to take things day by day and will make decisions based on her response to tx and more time to assess recovery.     We discussed consideration for limitations to care (including code status) and also the role of [facility] supporting at home after hospital stay. She will take time to consider further and inform if any decisions are made.     Emotional support provided, and will check in early next week if still hospitalized.    Problem List:       Principal Problem:    Hypernatremia  Active Problems:    Hyperkalemia    Urinary tract infection with hematuria, site unspecified    CKD (chronic kidney disease) stage 4, GFR 15-29 ml/min ([facility])    Acute on chronic renal insufficiency    Type 2 MI (myocardial infarction) ([facility])    Dehydration, severe    Acidosis    Palliative care encounter    Counseling regarding advance care planning and goals of  care        Palliative Care Recommendations/Plan:         Goals of Care:  Continue medical management.  Rubi is making decisions based on presentation and response to treatment with more time.   Consideration for [facility] to be made by daughter pending course.    Code Status: Full Code.   Daughter considering based on clinical course.    HCPOA: Rubi Mcdermott    Symptoms - Pain in extremities: tylenol prn, theragesic prn    Disposition:  Pending clinical course, anticipating home w family at [place].      A total of 35 minutes were spent on this visit, which included all of the following:  Chart review, direct face to face contact, history taking, physical examination, counseling and coordinating care, and documentation.        Reviewed the above with patient's RN.    Thank you for inviting Palliative Care to participate in the care of Ann Herndon and family.       Will follow peripherally.      RANDI Ann  Palliative Care    4/18/2025  11:01 AM    The 21st Century Cures Act makes medical notes like these available to patients in the interest of transparency. Please be advised this is a medical document. Medical documents are intended to carry relevant information, facts as evident, and the clinical opinion of the practitioner. The medical note is intended as a peer to peer communication, and may appear blunt or direct. It is written in medical language and may contain abbreviations or verbiage that are unfamiliar.

## 2025-04-18 NOTE — PLAN OF CARE
Patient is alert and oriented x1, confused, on 2L of oxygen, VSS. IVF infusing. Meds given per MAR. BUE swollen. US of right (-) for DVT. Awaiting US of left. CT of head completed over night. Safety precautions in place, call light in reach.     Problem: METABOLIC/FLUID AND ELECTROLYTES - ADULT  Goal: Electrolytes maintained within normal limits  Description: INTERVENTIONS:- Monitor labs and rhythm and assess patient for signs and symptoms of electrolyte imbalances- Administer electrolyte replacement as ordered- Monitor response to electrolyte replacements, including rhythm and repeat lab results as appropriate- Fluid restriction as ordered- Instruct patient on fluid and nutrition restrictions as appropriate  Outcome: Progressing  Goal: Glucose maintained within prescribed range  Description: INTERVENTIONS:- Monitor Blood Glucose as ordered- Assess for signs and symptoms of hyperglycemia and hypoglycemia- Administer ordered medications to maintain glucose within target range- Assess barriers to adequate nutritional intake and initiate nutrition consult as needed- Instruct patient on self management of diabetes  Outcome: Progressing     Problem: SAFETY ADULT - FALL  Goal: Free from fall injury  Description: INTERVENTIONS:- Assess pt frequently for physical needs- Identify cognitive and physical deficits and behaviors that affect risk of falls.- [place] fall precautions as indicated by assessment.- Educate pt/family on patient safety including physical limitations- Instruct pt to call for assistance with activity based on assessment- Modify environment to reduce risk of injury- Provide assistive devices as appropriate- Consider OT/PT consult to assist with strengthening/mobility- Encourage toileting schedule  Outcome: Progressing     Problem: GASTROINTESTINAL - ADULT  Goal: Maintains or returns to baseline bowel function  Description: INTERVENTIONS:- Assess bowel function- Maintain adequate hydration with IV or PO  as ordered and tolerated- Evaluate effectiveness of GI medications- Encourage mobilization and activity- Obtain nutritional consult as needed- Establish a toileting routine/schedule- Consider collaborating with pharmacy to review patient's medication profile  Outcome: Progressing  Goal: Achieves appropriate nutritional intake (bariatric)  Description: INTERVENTIONS:- Monitor for over-consumption- Identify factors contributing to increased intake, treat as appropriate- Monitor I&O, WT and lab values- Obtain nutritional consult as needed- Evaluate psychosocial factors contributing to over-consumption  Outcome: Progressing     Problem: SKIN/TISSUE INTEGRITY - ADULT  Goal: Skin integrity remains intact  Description: INTERVENTIONS- Assess and document risk factors for pressure ulcer development- Assess and document skin integrity- Monitor for areas of redness and/or skin breakdown- Initiate interventions, skin care algorithm/standards of care as needed  Outcome: Progressing     Problem: PAIN - ADULT  Goal: Verbalizes/displays adequate comfort level or patient's stated pain goal  Description: INTERVENTIONS:- Encourage pt to monitor pain and request assistance- Assess pain using appropriate pain scale- Administer analgesics based on type and severity of pain and evaluate response- Implement non-pharmacological measures as appropriate and evaluate response- Consider cultural and social influences on pain and pain management- Manage/alleviate anxiety- Utilize distraction and/or relaxation techniques- Monitor for opioid side effects- Notify MD/LIP if interventions unsuccessful or patient reports new pain- Anticipate increased pain with activity and pre-medicate as appropriate  Outcome: Progressing

## 2025-04-19 LAB
ALBUMIN SERPL-MCNC: 3.2 G/DL (ref 3.2–4.8)
ALBUMIN/GLOB SERPL: 1.2 {RATIO} (ref 1–2)
ALP LIVER SERPL-CCNC: 72 U/L (ref 55–142)
ALT SERPL-CCNC: 11 U/L (ref 10–49)
ANION GAP SERPL CALC-SCNC: 12 MMOL/L (ref 0–18)
AST SERPL-CCNC: 22 U/L (ref ?–34)
BASOPHILS # BLD AUTO: 0.02 X10(3) UL (ref 0–0.2)
BASOPHILS NFR BLD AUTO: 0.3 %
BILIRUB SERPL-MCNC: 0.2 MG/DL (ref 0.2–0.9)
BUN BLD-MCNC: 83 MG/DL (ref 9–23)
CALCIUM BLD-MCNC: 8.4 MG/DL (ref 8.7–10.6)
CHLORIDE SERPL-SCNC: 104 MMOL/L (ref 98–112)
CO2 SERPL-SCNC: 23 MMOL/L (ref 21–32)
CREAT BLD-MCNC: 4.77 MG/DL (ref 0.55–1.02)
EGFRCR SERPLBLD CKD-EPI 2021: 8 ML/MIN/1.73M2 (ref 60–?)
EOSINOPHIL # BLD AUTO: 0.03 X10(3) UL (ref 0–0.7)
EOSINOPHIL NFR BLD AUTO: 0.4 %
ERYTHROCYTE [DISTWIDTH] IN BLOOD BY AUTOMATED COUNT: 12.6 %
GLOBULIN PLAS-MCNC: 2.6 G/DL (ref 2–3.5)
GLUCOSE BLD-MCNC: 123 MG/DL (ref 70–99)
GLUCOSE BLD-MCNC: 137 MG/DL (ref 70–99)
GLUCOSE BLD-MCNC: 169 MG/DL (ref 70–99)
GLUCOSE BLD-MCNC: 193 MG/DL (ref 70–99)
GLUCOSE BLD-MCNC: 207 MG/DL (ref 70–99)
HCT VFR BLD AUTO: 23.2 % (ref 35–48)
HGB BLD-MCNC: 7.6 G/DL (ref 12–16)
IMM GRANULOCYTES # BLD AUTO: 0.05 X10(3) UL (ref 0–1)
IMM GRANULOCYTES NFR BLD: 0.6 %
LYMPHOCYTES # BLD AUTO: 0.53 X10(3) UL (ref 1–4)
LYMPHOCYTES NFR BLD AUTO: 6.8 %
MCH RBC QN AUTO: 28.8 PG (ref 26–34)
MCHC RBC AUTO-ENTMCNC: 32.8 G/DL (ref 31–37)
MCV RBC AUTO: 87.9 FL (ref 80–100)
MONOCYTES # BLD AUTO: 0.47 X10(3) UL (ref 0.1–1)
MONOCYTES NFR BLD AUTO: 6 %
NEUTROPHILS # BLD AUTO: 6.75 X10 (3) UL (ref 1.5–7.7)
NEUTROPHILS # BLD AUTO: 6.75 X10(3) UL (ref 1.5–7.7)
NEUTROPHILS NFR BLD AUTO: 85.9 %
OSMOLALITY SERPL CALC.SUM OF ELEC: 314 MOSM/KG (ref 275–295)
PLATELET # BLD AUTO: 108 10(3)UL (ref 150–450)
PLATELETS.RETICULATED NFR BLD AUTO: 8 % (ref 0–7)
POTASSIUM SERPL-SCNC: 4 MMOL/L (ref 3.5–5.1)
PROT SERPL-MCNC: 5.8 G/DL (ref 5.7–8.2)
RBC # BLD AUTO: 2.64 X10(6)UL (ref 3.8–5.3)
SODIUM SERPL-SCNC: 139 MMOL/L (ref 136–145)
WBC # BLD AUTO: 7.9 X10(3) UL (ref 4–11)

## 2025-04-19 PROCEDURE — 99232 SBSQ HOSP IP/OBS MODERATE 35: CPT | Performed by: INTERNAL MEDICINE

## 2025-04-19 NOTE — PLAN OF CARE
Patient is alert and oriented x1, is on 1L of oxygen. No complaints of pain, meds given per MAR. Safety precautions in place, call light in reach.     Problem: METABOLIC/FLUID AND ELECTROLYTES - ADULT  Goal: Electrolytes maintained within normal limits  Description: INTERVENTIONS:- Monitor labs and rhythm and assess patient for signs and symptoms of electrolyte imbalances- Administer electrolyte replacement as ordered- Monitor response to electrolyte replacements, including rhythm and repeat lab results as appropriate- Fluid restriction as ordered- Instruct patient on fluid and nutrition restrictions as appropriate  Outcome: Progressing  Goal: Glucose maintained within prescribed range  Description: INTERVENTIONS:- Monitor Blood Glucose as ordered- Assess for signs and symptoms of hyperglycemia and hypoglycemia- Administer ordered medications to maintain glucose within target range- Assess barriers to adequate nutritional intake and initiate nutrition consult as needed- Instruct patient on self management of diabetes  Outcome: Progressing     Problem: SAFETY ADULT - FALL  Goal: Free from fall injury  Description: INTERVENTIONS:- Assess pt frequently for physical needs- Identify cognitive and physical deficits and behaviors that affect risk of falls.- [place] fall precautions as indicated by assessment.- Educate pt/family on patient safety including physical limitations- Instruct pt to call for assistance with activity based on assessment- Modify environment to reduce risk of injury- Provide assistive devices as appropriate- Consider OT/PT consult to assist with strengthening/mobility- Encourage toileting schedule  Outcome: Progressing     Problem: GASTROINTESTINAL - ADULT  Goal: Maintains or returns to baseline bowel function  Description: INTERVENTIONS:- Assess bowel function- Maintain adequate hydration with IV or PO as ordered and tolerated- Evaluate effectiveness of GI medications- Encourage mobilization and  activity- Obtain nutritional consult as needed- Establish a toileting routine/schedule- Consider collaborating with pharmacy to review patient's medication profile  Outcome: Progressing  Goal: Achieves appropriate nutritional intake (bariatric)  Description: INTERVENTIONS:- Monitor for over-consumption- Identify factors contributing to increased intake, treat as appropriate- Monitor I&O, WT and lab values- Obtain nutritional consult as needed- Evaluate psychosocial factors contributing to over-consumption  Outcome: Progressing     Problem: SKIN/TISSUE INTEGRITY - ADULT  Goal: Skin integrity remains intact  Description: INTERVENTIONS- Assess and document risk factors for pressure ulcer development- Assess and document skin integrity- Monitor for areas of redness and/or skin breakdown- Initiate interventions, skin care algorithm/standards of care as needed  Outcome: Progressing     Problem: PAIN - ADULT  Goal: Verbalizes/displays adequate comfort level or patient's stated pain goal  Description: INTERVENTIONS:- Encourage pt to monitor pain and request assistance- Assess pain using appropriate pain scale- Administer analgesics based on type and severity of pain and evaluate response- Implement non-pharmacological measures as appropriate and evaluate response- Consider cultural and social influences on pain and pain management- Manage/alleviate anxiety- Utilize distraction and/or relaxation techniques- Monitor for opioid side effects- Notify MD/LIP if interventions unsuccessful or patient reports new pain- Anticipate increased pain with activity and pre-medicate as appropriate  Outcome: Progressing

## 2025-04-19 NOTE — PLAN OF CARE
Pt A/O x1-2. Vss. Afebrile. Pt c/o pain during day, PRN tylenol and topical theragesic admin per MAR w/ relief. Medications admin per MAR. Pt repositioned in bed. Waffle cushion and pillow support provided. Sacral mepilex changed, CDI. Pt's daughter at bedside and updated on POC, in agreement. Fall and safety precautions in place. Call light within reach.

## 2025-04-19 NOTE — PROGRESS NOTES
[facility] Hospitalist Progress Note     PCP: Cory Sanchez DO    Chief Complaint: follow-up   Follow up for: The primary encounter diagnosis was Hypernatremia. Diagnoses of Acute on chronic renal insufficiency, Type 2 MI (myocardial infarction) (HCC), Urinary tract infection with hematuria, site unspecified, and Dehydration, severe were also pertinent to this visit.    Overnight/Interim Events:    SUBJECTIVE:  Patient seen and examined.  Improving per daughter.  Intermittently confused.   Denies CP/SOB.  NAD.     OBJECTIVE:  Temp:  [98.3 °F (36.8 °C)-99.6 °F (37.6 °C)] 98.7 °F (37.1 °C)  Pulse:  [58-79] 69  Resp:  [16-19] 18  BP: (135-173)/(34-98) 159/40  SpO2:  [93 %-99 %] 95 %    Intake/Output:    Intake/Output Summary (Last 24 hours) at 4/19/2025 1156  Last data filed at 4/18/2025 1230  Gross per 24 hour   Intake 480 ml   Output --   Net 480 ml         Last 3 Weights   04/13/25 2256 126 lb (57.2 kg)   04/13/25 1841 126 lb (57.2 kg)   03/29/23 1434 135 lb (61.2 kg)   07/05/22 1058 135 lb 2 oz (61.3 kg)       Exam    General: no distress, appears stated age.  Awake, nonverbal    Head:  Normocephalic, without obvious abnormality, atraumatic.   Eyes:  Sclera anicteric, EOMs intact.    Nose: Nares normal,  Mucosa normal    Throat: Lips normal   Neck: Supple, symmetrical, trachea midline   Lungs:   Clear to auscultation bilaterally. Normal effort   Chest wall:  No tenderness or deformity   Heart:  Regular rate and rhythm, S1, S2 normal, no murmur, rub or gallop appreciated   Abdomen:   Soft, NT/ND, Bowel sounds normal. No masses,  No organomegaly.    Extremities: Extremities normal, atraumatic, no cyanosis or LE edema.   Skin: Skin color, texture, turgor normal. No rashes or lesions.    Neurologic: Moving all extremities spontaneously, no focal deficit appreciated.   Unable to follow commands for full neuro exam           Data Review:       Labs:     Recent Labs   Lab 04/14/25  0630 04/15/25  0610 04/16/25  0658  04/18/25  0628 04/19/25  0656   WBC 9.4 6.5 6.6 7.9 7.9   HGB 8.9* 8.0* 8.4* 7.4* 7.6*   MCV 97.1 96.8 95.3 90.1 87.9   .0* 112.0* 109.0* 96.0* 108.0*       Recent Labs   Lab 04/14/25  0630 04/15/25  0610 04/16/25  0658 04/17/25  0551 04/18/25  0628 04/19/25  0656   * 146* 138 136 137 139   K 5.2* 4.7 4.4 4.3 3.9 4.0   * 115* 111 105 104 104   CO2 22.0 20.0* 15.0* 20.0* 22.0 23.0   * 108* 84* 91* 84* 83*   CREATSERUM 5.35* 5.05* 4.68* 4.56* 4.77* 4.77*   CA 10.1 8.4* 7.7* 8.2* 8.2* 8.4*   MG 3.2* 2.8* 2.5  --  2.4  --    * 68* 80 206* 129* 123*       Recent Labs   Lab 04/13/25  1845 04/19/25  0656   ALT 25 11   AST 47* 22   ALB 4.1 3.2       Recent Labs   Lab 04/18/25  1230 04/18/25  1603 04/18/25  2042 04/19/25  0515 04/19/25  1128   PGLU 156* 164* 231* 137* 169*       No results for input(s): \"TROP\" in the last 168 hours.      Meds:     Scheduled Medications[1]  Medication Infusions[2]  PRN Medications[3]       Assessment/Plan:     89 year old female with PMH including but not limited to HTN, CKD, DM, HL who p/t [facility] ED c weakness and hyperNa.      Weakness  FTT  Prior Falls   Lethargy - improving   - likely 2/2 hyperNa and dementia and UTI and age   - PT/OT/SLP  - follow PO intake  - CTH chronic small vessel ischemic change   - CT spine multilevel degenerative disc disease in the cervical spine   - XR hip/shoulder no fx  - CXR noted, follow clinical sxs   - dtr reports supposed to have MRI next week as o/p   - infection w/u so far negative, procal 0.8, has been on CTX since 4/14  - CXR with LLL atelectasis/pleural effusion   - IV tylenol for pain   - neurology consulted for lethargy, EEG unremarkable - encephalopathy resolved, likely toxic/metabolic, given age and underlying dementia this is likely as close as she'll get back to her baseline       Abnormal UA, UTI  - IV CTX to ancef - plan for 1w course   - Ucx >100K EColi  - pan sens     Acute kidney injury on CKD4:   - B/l  creatinine of 2.4-2.9 mg/dL in setting of long-standing HTN, diabetic nephropathy and advanced age. follows c Dr. Manzano   - JOSE ANTONIO likely 2/2 pre-renal azotemia  - apprec recs, IVF, improving  - dc IVF, cont diuresis per renal   - avoid nephrotoxins  - CT bladder wall thickening, no renal or ureteral stones.   - monitor renal fxn      Hypernatremia, now resolved   On hypotonic fluid per renal  - follow BMP     Hyperkalemia   - per renal, IVF      HTN c urgency   - holding arb/toresemide  - SBP up to 180s  - prn hydralazine added      # constipation  bowel regimen     # Type 2 diabetes mellitus, controlled A1c 6.0   -Hyperglycemic protocol with accu-checks QID and low-dose sliding scale insulin. Will keep 1800 ADA diet. Will hold PO meds  - stressed imporatnce of BG control and compliance c meds to prevent long-term cardiac, vascular, neuro, renal complications     # B/l UE swelling  - RUE doppler neg  - check LUE doppler    # Hypoglycemia x1 in AM  - protocol, likely dec PO intake   - lower SSI if persists     # GOC  - pallaitive c/s apprec, FC for now, can re-assess as needed pending clinical situation. D/w RANDI Samuel   - 04/18/25 - Advanced care planning - 16 minutes were spent discussing advanced care planning exclusive of the documented time for this visit. D/w daughter, patient nonverbal. For now will maintain full code, she will discuss with her siblings regarding GOC.       # Proph  - sqh        Dispo: med  -Lives c dtr   - DCP Monday, will need HHC, await SW evaluation Monday      Lina Cardenas MD  [facility]  Message over [facility]/[employer]  Pager: [phone]         [1]    bumetanide  2 mg Intravenous BID (Diuretic)    ceFAZolin  500 mg Intravenous Q24H    insulin aspart  1-10 Units Subcutaneous TID AC and HS    heparin  5,000 Units Subcutaneous 2 times per day   [2] [3]   menthol/methyl salicylate    hydrALAzine    acetaminophen    metoclopramide    glucose **OR** glucose  **OR** glucose-vitamin C **OR** dextrose **OR** glucose **OR** glucose **OR** glucose-vitamin C    acetaminophen    polyethylene glycol (PEG 3350)    sennosides    bisacodyl    ondansetron

## 2025-04-19 NOTE — PROGRESS NOTES
[facility]   part of [facility]     Nephrology Progress Note    Ann Herndon Patient Status:  Inpatient    1925 MRN [id number]   Location [facility] 4NW-A Attending Manuel Chan MD   Hosp Day # 6 PCP Cory Sanchez,        SUBJECTIVE:  Appears more alert this morning.    Physical Exam:   /40   Pulse 67   Temp 98.7 °F (37.1 °C) (Axillary)   Resp 18   Ht 4' 9\" (1.448 m)   Wt 126 lb (57.2 kg)   LMP  (LMP Unknown)   SpO2 99%   BMI 27.27 kg/m²   Temp (24hrs), Av.7 °F (37.1 °C), Min:98.3 °F (36.8 °C), Max:99.6 °F (37.6 °C)       Intake/Output Summary (Last 24 hours) at 2025 1011  Last data filed at 2025 1230  Gross per 24 hour   Intake 480 ml   Output --   Net 480 ml     Last 3 Weights   25 2256 126 lb (57.2 kg)   25 1841 126 lb (57.2 kg)   23 1434 135 lb (61.2 kg)   22 1058 135 lb 2 oz (61.3 kg)   21 1412 132 lb (59.9 kg)   21 1435 133 lb (60.3 kg)     General: awake, in no apparent distress.  HEENT: No scleral icterus, MMM  Neck: Supple, no EILEEN or thyromegaly  Cardiac: Regular rate and rhythm, S1, S2 normal, no murmur or rub  Lungs: Clear without wheezes, rales, rhonchi.    Abdomen: Soft, non-tender.   Extremities: + generalized edema.  Neurologic: moving all extremities  Skin: Warm and dry, no rash        Labs:     Recent Labs   Lab 25  0630 04/15/25  0610 25  0658 25  0628 25  0656   WBC 9.4 6.5 6.6 7.9 7.9   HGB 8.9* 8.0* 8.4* 7.4* 7.6*   MCV 97.1 96.8 95.3 90.1 87.9   .0* 112.0* 109.0* 96.0* 108.0*       Recent Labs   Lab 25  0630 04/15/25  0610 25  0658 25  0551 25  0628 25  0656   * 146* 138 136 137 139   K 5.2* 4.7 4.4 4.3 3.9 4.0   * 115* 111 105 104 104   CO2 22.0 20.0* 15.0* 20.0* 22.0 23.0   * 108* 84* 91* 84* 83*   CREATSERUM 5.35* 5.05* 4.68* 4.56* 4.77* 4.77*   CA 10.1 8.4* 7.7* 8.2* 8.2* 8.4*   MG 3.2* 2.8* 2.5  --  2.4  --    GLU  167* 68* 80 206* 129* 123*       Recent Labs   Lab 04/13/25  1845 04/19/25  0656   ALT 25 11   AST 47* 22   ALB 4.1 3.2       Recent Labs   Lab 04/18/25  0509 04/18/25  1230 04/18/25  1603 04/18/25  2042 04/19/25  0515   PGLU 172* 156* 164* 231* 137*       Meds:   Current Hospital Medications[1]      Impression/Plan:      1) Acute kidney injury on CKD4: Has long-standing chronic kidney disease stage 4 with baseline serum creatinine of 2.4-2.9 mg/dL in setting of long-standing HTN, diabetic nephropathy and advanced age. Now presenting with acute kidney injury and hypernatremia in setting of worsening mental status and poor PO intake with concomitant diuretic use. JOSE ANTONIO likely 2/2 pre-renal azotemia, had been improving slowly with IVF although stagnant past few days.  IV bumex today. No other workup planned.  Imaging with no hydro.  Pt not a dialysis candidate.       2) Hypernatremia: Due to decreased free water intake d/t worsening mental status. Resolved with hypotonic fluids.      3) HTN: controlled currently, holding home losartan, hydralazine and torsemide.      4) Hyperkalemia: Due to JOSE ANTONIO and decreased distal sodium delivery. resolved. Off IVF.      5)  acidosis- improved, stop bicarb gtt    Thank you for allowing me to participate in this patient's care. Please feel free to call me with any questions or concerns.     Kirsty Jimenez MD  04/19/25       [1]   Current Facility-Administered Medications   Medication Dose Route Frequency    menthol/methyl salicylate (Thera-Gesic) 10-15 % cream   Topical TID PRN    bumetanide (Bumex) 0.25 MG/ML injection 2 mg  2 mg Intravenous BID (Diuretic)    hydrALAzine (Apresoline) 20 mg/mL injection 10 mg  10 mg Intravenous Q6H PRN    ceFAZolin (Ancef) 500 mg in sodium chloride 0.9% 50 mL IVPB  500 mg Intravenous Q24H    acetaminophen (Ofirmev) 10 mg/mL infusion premix 1,000 mg  1,000 mg Intravenous Q6H PRN    metoclopramide (Reglan) 5 mg/mL injection 5 mg  5 mg Intravenous Q8H PRN     glucose (Dex4) 15 GM/59ML oral liquid 15 g  15 g Oral Q15 Min PRN    Or    glucose (Glutose) 40% oral gel 15 g  15 g Oral Q15 Min PRN    Or    glucose-vitamin C (Dex-4) chewable tab 4 tablet  4 tablet Oral Q15 Min PRN    Or    dextrose 50% injection 50 mL  50 mL Intravenous Q15 Min PRN    Or    glucose (Dex4) 15 GM/59ML oral liquid 30 g  30 g Oral Q15 Min PRN    Or    glucose (Glutose) 40% oral gel 30 g  30 g Oral Q15 Min PRN    Or    glucose-vitamin C (Dex-4) chewable tab 8 tablet  8 tablet Oral Q15 Min PRN    insulin aspart (NovoLOG) 100 Units/mL FlexPen 1-10 Units  1-10 Units Subcutaneous TID AC and HS    heparin (Porcine) 5000 UNIT/ML injection 5,000 Units  5,000 Units Subcutaneous 2 times per day    acetaminophen (Tylenol Extra Strength) tab 500 mg  500 mg Oral Q4H PRN    polyethylene glycol (PEG 3350) (Miralax) 17 g oral packet 17 g  17 g Oral Daily PRN    sennosides (Senokot) tab 17.2 mg  17.2 mg Oral Nightly PRN    bisacodyl (Dulcolax) 10 MG rectal suppository 10 mg  10 mg Rectal Daily PRN    ondansetron (Zofran) 4 MG/2ML injection 4 mg  4 mg Intravenous Q6H PRN

## 2025-04-20 ENCOUNTER — APPOINTMENT (OUTPATIENT)
Dept: ULTRASOUND IMAGING | Facility: HOSPITAL | Age: OVER 89
DRG: 682 | End: 2025-04-20
Attending: INTERNAL MEDICINE
Payer: MEDICARE

## 2025-04-20 ENCOUNTER — APPOINTMENT (OUTPATIENT)
Dept: ULTRASOUND IMAGING | Facility: HOSPITAL | Age: OVER 89
End: 2025-04-20
Attending: INTERNAL MEDICINE
Payer: MEDICARE

## 2025-04-20 LAB
ANION GAP SERPL CALC-SCNC: 11 MMOL/L (ref 0–18)
BASOPHILS # BLD AUTO: 0.02 X10(3) UL (ref 0–0.2)
BASOPHILS NFR BLD AUTO: 0.3 %
BUN BLD-MCNC: 84 MG/DL (ref 9–23)
CALCIUM BLD-MCNC: 9.1 MG/DL (ref 8.7–10.6)
CHLORIDE SERPL-SCNC: 104 MMOL/L (ref 98–112)
CO2 SERPL-SCNC: 22 MMOL/L (ref 21–32)
CREAT BLD-MCNC: 4.43 MG/DL (ref 0.55–1.02)
EGFRCR SERPLBLD CKD-EPI 2021: 8 ML/MIN/1.73M2 (ref 60–?)
EOSINOPHIL # BLD AUTO: 0.05 X10(3) UL (ref 0–0.7)
EOSINOPHIL NFR BLD AUTO: 0.7 %
ERYTHROCYTE [DISTWIDTH] IN BLOOD BY AUTOMATED COUNT: 13 %
GLUCOSE BLD-MCNC: 143 MG/DL (ref 70–99)
GLUCOSE BLD-MCNC: 146 MG/DL (ref 70–99)
GLUCOSE BLD-MCNC: 157 MG/DL (ref 70–99)
GLUCOSE BLD-MCNC: 171 MG/DL (ref 70–99)
GLUCOSE BLD-MCNC: 283 MG/DL (ref 70–99)
HCT VFR BLD AUTO: 25.1 % (ref 35–48)
HGB BLD-MCNC: 7.9 G/DL (ref 12–16)
IMM GRANULOCYTES # BLD AUTO: 0.05 X10(3) UL (ref 0–1)
IMM GRANULOCYTES NFR BLD: 0.7 %
LYMPHOCYTES # BLD AUTO: 0.51 X10(3) UL (ref 1–4)
LYMPHOCYTES NFR BLD AUTO: 7.3 %
MCH RBC QN AUTO: 28.4 PG (ref 26–34)
MCHC RBC AUTO-ENTMCNC: 31.5 G/DL (ref 31–37)
MCV RBC AUTO: 90.3 FL (ref 80–100)
MONOCYTES # BLD AUTO: 0.45 X10(3) UL (ref 0.1–1)
MONOCYTES NFR BLD AUTO: 6.4 %
NEUTROPHILS # BLD AUTO: 5.95 X10 (3) UL (ref 1.5–7.7)
NEUTROPHILS # BLD AUTO: 5.95 X10(3) UL (ref 1.5–7.7)
NEUTROPHILS NFR BLD AUTO: 84.6 %
OSMOLALITY SERPL CALC.SUM OF ELEC: 312 MOSM/KG (ref 275–295)
PLATELET # BLD AUTO: 134 10(3)UL (ref 150–450)
POTASSIUM SERPL-SCNC: 3.9 MMOL/L (ref 3.5–5.1)
RBC # BLD AUTO: 2.78 X10(6)UL (ref 3.8–5.3)
SODIUM SERPL-SCNC: 137 MMOL/L (ref 136–145)
WBC # BLD AUTO: 7 X10(3) UL (ref 4–11)

## 2025-04-20 PROCEDURE — 99233 SBSQ HOSP IP/OBS HIGH 50: CPT | Performed by: INTERNAL MEDICINE

## 2025-04-20 PROCEDURE — 93971 EXTREMITY STUDY: CPT | Performed by: INTERNAL MEDICINE

## 2025-04-20 RX ORDER — HYDRALAZINE HYDROCHLORIDE 50 MG/1
50 TABLET, FILM COATED ORAL 3 TIMES DAILY
Status: DISCONTINUED | OUTPATIENT
Start: 2025-04-20 | End: 2025-04-22

## 2025-04-20 RX ORDER — BUMETANIDE 0.25 MG/ML
2 INJECTION, SOLUTION INTRAMUSCULAR; INTRAVENOUS
Status: COMPLETED | OUTPATIENT
Start: 2025-04-20 | End: 2025-04-21

## 2025-04-20 NOTE — PROGRESS NOTES
[facility] Hospitalist Progress Note     PCP: Cory Sanchez DO    Chief Complaint: follow-up   Follow up for: The primary encounter diagnosis was Hypernatremia. Diagnoses of Acute on chronic renal insufficiency, Type 2 MI (myocardial infarction) (HCC), Urinary tract infection with hematuria, site unspecified, and Dehydration, severe were also pertinent to this visit.    Overnight/Interim Events:    SUBJECTIVE:  Patient seen and examined.  More awake today, responding verbally.  Pleasantly confused.    Improving per daughter.  Denies CP/SOB.  NAD.     OBJECTIVE:  Temp:  [98.2 °F (36.8 °C)-98.8 °F (37.1 °C)] 98.8 °F (37.1 °C)  Pulse:  [67-79] 68  Resp:  [14-20] 15  BP: (144-171)/(40-85) 168/42  SpO2:  [91 %-97 %] 94 %    Intake/Output:  No intake or output data in the 24 hours ending 04/20/25 0840        Last 3 Weights   04/13/25 2256 126 lb (57.2 kg)   04/13/25 1841 126 lb (57.2 kg)   03/29/23 1434 135 lb (61.2 kg)   07/05/22 1058 135 lb 2 oz (61.3 kg)       Exam    General: no distress, appears stated age.  Awake, nonverbal    Head:  Normocephalic, without obvious abnormality, atraumatic.   Eyes:  Sclera anicteric, EOMs intact.    Nose: Nares normal,  Mucosa normal    Throat: Lips normal   Neck: Supple, symmetrical, trachea midline   Lungs:   Clear to auscultation bilaterally. Normal effort   Chest wall:  No tenderness or deformity   Heart:  Regular rate and rhythm, S1, S2 normal, no murmur, rub or gallop appreciated   Abdomen:   Soft, NT/ND, Bowel sounds normal. No masses,  No organomegaly.    Extremities: Extremities normal, atraumatic, no cyanosis or LE edema.   Skin: Skin color, texture, turgor normal. No rashes or lesions.    Neurologic: Moving all extremities spontaneously, no focal deficit appreciated.   Unable to follow commands for full neuro exam           Data Review:       Labs:     Recent Labs   Lab 04/15/25  0610 04/16/25  0658 04/18/25  0628 04/19/25  0656 04/20/25  0611   WBC 6.5 6.6 7.9 7.9 7.0   HGB  8.0* 8.4* 7.4* 7.6* 7.9*   MCV 96.8 95.3 90.1 87.9 90.3   .0* 109.0* 96.0* 108.0* 134.0*       Recent Labs   Lab 04/14/25  0630 04/15/25  0610 04/16/25  0658 04/17/25  0551 04/18/25  0628 04/19/25  0656 04/20/25  0611   * 146* 138 136 137 139 137   K 5.2* 4.7 4.4 4.3 3.9 4.0 3.9   * 115* 111 105 104 104 104   CO2 22.0 20.0* 15.0* 20.0* 22.0 23.0 22.0   * 108* 84* 91* 84* 83* 84*   CREATSERUM 5.35* 5.05* 4.68* 4.56* 4.77* 4.77* 4.43*   CA 10.1 8.4* 7.7* 8.2* 8.2* 8.4* 9.1   MG 3.2* 2.8* 2.5  --  2.4  --   --    * 68* 80 206* 129* 123* 143*       Recent Labs   Lab 04/13/25  1845 04/19/25  0656   ALT 25 11   AST 47* 22   ALB 4.1 3.2       Recent Labs   Lab 04/19/25  0515 04/19/25  1128 04/19/25  1547 04/19/25  2140 04/20/25  0508   PGLU 137* 169* 207* 193* 146*       No results for input(s): \"TROP\" in the last 168 hours.      Meds:     Scheduled Medications[1]  Medication Infusions[2]  PRN Medications[3]       Assessment/Plan:     89 year old female with PMH including but not limited to HTN, CKD, DM, HL who p/t [facility] ED c weakness and hyperNa.      Toxic Metabolic Encephalopathy POA 2/2 Uremia, hypernatremia and UTI   Weakness  FTT  Prior Falls   Lethargy - improving   - likely 2/2 hyperNa and dementia and UTI and age   - PT/OT/SLP  - follow PO intake  - CTH chronic small vessel ischemic change   - CT spine multilevel degenerative disc disease in the cervical spine   - XR hip/shoulder no fx  - CXR noted, follow clinical sxs   - infection w/u so far negative  - CXR with LLL atelectasis/pleural effusion   - IV tylenol for pain   - neurology consulted for lethargy, EEG unremarkable - encephalopathy resolved, likely toxic/metabolic, given age and underlying dementia this is likely as close as she'll get back to her baseline       Acute E.coli UTI POA, now treated  - completed CTX/ancef 1w course on 4/19   - Ucx >100K EColi  - pan sens     Acute kidney injury on CKD4:   - B/l creatinine of  2.4-2.9 mg/dL in setting of long-standing HTN, diabetic nephropathy and advanced age. follows c Dr. Manzano   - JOSE ANTONIO likely 2/2 pre-renal azotemia  - apprec recs, IVF, improving  - dc IVF, cont diuresis per renal   - avoid nephrotoxins  - CT bladder wall thickening, no renal or ureteral stones.   - monitor renal fxn      Hypernatremia, now resolved   - initially was on hypotonic fluid per renal  - follow BMP     Hyperkalemia   - per renal, IVF      HTN c urgency   - holding arb/toresemide  - SBP up to 180s  - prn hydralazine added      # constipation  bowel regimen     # Type 2 diabetes mellitus, controlled A1c 6.0   -Hyperglycemic protocol with accu-checks QID and low-dose sliding scale insulin. Will keep 1800 ADA diet. Will hold PO meds  - stressed imporatnce of BG control and compliance c meds to prevent long-term cardiac, vascular, neuro, renal complications     # B/l UE swelling  - RUE doppler neg  - likely from IV infiltration  - compression/elevation     # Hypoglycemia x1 in AM  - protocol, likely dec PO intake   - lower SSI if persists     # GOC  - pallaitive c/s apprec, FC for now, can re-assess as needed pending clinical situation. D/w RANDI Samuel   - 04/18/25 - Advanced care planning - 16 minutes were spent discussing advanced care planning exclusive of the documented time for this visit. D/w daughter, patient nonverbal. For now will maintain full code, she will discuss with her siblings regarding GOC.     # Proph  - sqh    Dispo: med  -Lives c dtr   - DCP Monday, will need HHC, await SW evaluation Monday      Lina Cardenas MD  [facility] Center[facility]  Message over [email or web address]  Pager: [phone]         [1]    insulin aspart  1-10 Units Subcutaneous TID AC and HS    heparin  5,000 Units Subcutaneous 2 times per day   [2] [3]   menthol/methyl salicylate    hydrALAzine    acetaminophen    metoclopramide    glucose **OR** glucose **OR** glucose-vitamin C **OR** dextrose **OR** glucose  **OR** glucose **OR** glucose-vitamin C    acetaminophen    polyethylene glycol (PEG 3350)    sennosides    bisacodyl    ondansetron

## 2025-04-20 NOTE — CM/SW NOTE
Spoke with patient's daughter Rubi to follow up on HH. She states she plans on talking more with the doctor tomorrow and has not decided on HH yet.    SW/CM to remain available for support and/or discharge planning.    Nanda AHN LCSW  Discharge Planner

## 2025-04-20 NOTE — PROGRESS NOTES
[facility]   part of [facility]     Nephrology Progress Note    Ann Herndon Patient Status:  Inpatient    1925 MRN [id number]   Location [facility] 4NW-A Attending Manuel Chan MD   Hosp Day # 7 PCP Cory Sanchez DO       SUBJECTIVE:  Chart reviewed, no acute events overnight.     Physical Exam:   BP (!) 174/41   Pulse 68   Temp 98.8 °F (37.1 °C) (Oral)   Resp 15   Ht 4' 9\" (1.448 m)   Wt 126 lb (57.2 kg)   LMP  (LMP Unknown)   SpO2 93%   BMI 27.27 kg/m²   Temp (24hrs), Av.5 °F (36.9 °C), Min:98.2 °F (36.8 °C), Max:98.8 °F (37.1 °C)     No intake or output data in the 24 hours ending 25 1128    Last 3 Weights   25 2256 126 lb (57.2 kg)   25 1841 126 lb (57.2 kg)   23 1434 135 lb (61.2 kg)   22 1058 135 lb 2 oz (61.3 kg)   21 1412 132 lb (59.9 kg)   21 1435 133 lb (60.3 kg)     General: awake, in no apparent distress.  HEENT: No scleral icterus, MMM  Neck: Supple, no EILEEN or thyromegaly  Cardiac: Regular rate and rhythm, S1, S2 normal, no murmur or rub  Lungs: Clear without wheezes, rales, rhonchi.    Abdomen: Soft, non-tender.   Extremities: + generalized edema.  Neurologic: moving all extremities  Skin: Warm and dry, no rash    Labs:     Recent Labs   Lab 04/15/25  0610 25  0658 25  0628 25  0656 25  0611   WBC 6.5 6.6 7.9 7.9 7.0   HGB 8.0* 8.4* 7.4* 7.6* 7.9*   MCV 96.8 95.3 90.1 87.9 90.3   .0* 109.0* 96.0* 108.0* 134.0*       Recent Labs   Lab 25  0630 04/15/25  0610 25  0658 25  0551 25  0628 25  0656 25  0611   * 146* 138 136 137 139 137   K 5.2* 4.7 4.4 4.3 3.9 4.0 3.9   * 115* 111 105 104 104 104   CO2 22.0 20.0* 15.0* 20.0* 22.0 23.0 22.0   * 108* 84* 91* 84* 83* 84*   CREATSERUM 5.35* 5.05* 4.68* 4.56* 4.77* 4.77* 4.43*   CA 10.1 8.4* 7.7* 8.2* 8.2* 8.4* 9.1   MG 3.2* 2.8* 2.5  --  2.4  --   --    * 68* 80 206* 129* 123* 143*        Recent Labs   Lab 04/13/25  1845 04/19/25  0656   ALT 25 11   AST 47* 22   ALB 4.1 3.2       Recent Labs   Lab 04/19/25  1128 04/19/25  1547 04/19/25  2140 04/20/25  0508 04/20/25  1052   PGLU 169* 207* 193* 146* 171*       Meds:   Current Hospital Medications[1]      Impression/Plan:      1) Acute kidney injury on CKD4: Has long-standing chronic kidney disease stage 4 with baseline serum creatinine of 2.4-2.9 mg/dL in setting of long-standing HTN, diabetic nephropathy and advanced age. Now presenting with acute kidney injury and hypernatremia in setting of worsening mental status and poor PO intake with concomitant diuretic use. JOSE ANTONIO likely 2/2 pre-renal azotemia, had been improving slowly with IVF then became stagnant. Now slowly improving this morning. Continue IV bumex.  No other workup planned.  Imaging with no hydro.  Pt not a dialysis candidate.       2) Hypernatremia: Due to decreased free water intake d/t worsening mental status. Resolved with hypotonic fluids.      3) HTN: blood pressure rising, resume home hydralazine. Holding home losartan and torsemide.      5) Hyperkalemia/acidosis- resolved    Thank you for allowing me to participate in this patient's care. Please feel free to call me with any questions or concerns.     Kirsty Jimenez MD  04/20/25       [1]   Current Facility-Administered Medications   Medication Dose Route Frequency    menthol/methyl salicylate (Thera-Gesic) 10-15 % cream   Topical TID PRN    hydrALAzine (Apresoline) 20 mg/mL injection 10 mg  10 mg Intravenous Q6H PRN    acetaminophen (Ofirmev) 10 mg/mL infusion premix 1,000 mg  1,000 mg Intravenous Q6H PRN    metoclopramide (Reglan) 5 mg/mL injection 5 mg  5 mg Intravenous Q8H PRN    glucose (Dex4) 15 GM/59ML oral liquid 15 g  15 g Oral Q15 Min PRN    Or    glucose (Glutose) 40% oral gel 15 g  15 g Oral Q15 Min PRN    Or    glucose-vitamin C (Dex-4) chewable tab 4 tablet  4 tablet Oral Q15 Min PRN    Or    dextrose 50% injection  50 mL  50 mL Intravenous Q15 Min PRN    Or    glucose (Dex4) 15 GM/59ML oral liquid 30 g  30 g Oral Q15 Min PRN    Or    glucose (Glutose) 40% oral gel 30 g  30 g Oral Q15 Min PRN    Or    glucose-vitamin C (Dex-4) chewable tab 8 tablet  8 tablet Oral Q15 Min PRN    insulin aspart (NovoLOG) 100 Units/mL FlexPen 1-10 Units  1-10 Units Subcutaneous TID AC and HS    heparin (Porcine) 5000 UNIT/ML injection 5,000 Units  5,000 Units Subcutaneous 2 times per day    acetaminophen (Tylenol Extra Strength) tab 500 mg  500 mg Oral Q4H PRN    polyethylene glycol (PEG 3350) (Miralax) 17 g oral packet 17 g  17 g Oral Daily PRN    sennosides (Senokot) tab 17.2 mg  17.2 mg Oral Nightly PRN    bisacodyl (Dulcolax) 10 MG rectal suppository 10 mg  10 mg Rectal Daily PRN    ondansetron (Zofran) 4 MG/2ML injection 4 mg  4 mg Intravenous Q6H PRN

## 2025-04-20 NOTE — PLAN OF CARE
Pt a/o x1. Hypertensive. Prn w/ improvement. Other VSS on RA. C/o pain w/ arm movement. Meds per MAR. Incontinent needs met. Sacral mepilex changed. Pt bathed. Pending US doppler LUE.     Fall risk protocol followed, call light within reach.

## 2025-04-20 NOTE — PLAN OF CARE
Pt A/O x1. VSS. Afebrile. Pt exhibited signs of pain during day, PRN tylenol admin per MAR. Medications admin per MAR. Pt repositioned in bed. Waffle cushion/pillow support provided. Pt had LUE US doppler today, negative for DVT. Pt's daughter at bedside, updated on POC and in agreement. Fall and safety precautions in place. Call light within reach.

## 2025-04-20 NOTE — PHYSICAL THERAPY NOTE
Attempted twice  1st attempt: daughter is feeding patient breakfast  2nd attempt: patient is lethargic. RN aware

## 2025-04-21 PROBLEM — I10 ESSENTIAL HYPERTENSION: Status: ACTIVE | Noted: 2020-11-20

## 2025-04-21 LAB
ANION GAP SERPL CALC-SCNC: 13 MMOL/L (ref 0–18)
BASOPHILS # BLD AUTO: 0.01 X10(3) UL (ref 0–0.2)
BASOPHILS NFR BLD AUTO: 0.2 %
BUN BLD-MCNC: 85 MG/DL (ref 9–23)
CALCIUM BLD-MCNC: 9.2 MG/DL (ref 8.7–10.6)
CHLORIDE SERPL-SCNC: 103 MMOL/L (ref 98–112)
CO2 SERPL-SCNC: 23 MMOL/L (ref 21–32)
CREAT BLD-MCNC: 4.3 MG/DL (ref 0.55–1.02)
EGFRCR SERPLBLD CKD-EPI 2021: 9 ML/MIN/1.73M2 (ref 60–?)
EOSINOPHIL # BLD AUTO: 0.06 X10(3) UL (ref 0–0.7)
EOSINOPHIL NFR BLD AUTO: 1 %
ERYTHROCYTE [DISTWIDTH] IN BLOOD BY AUTOMATED COUNT: 12.9 %
GLUCOSE BLD-MCNC: 160 MG/DL (ref 70–99)
GLUCOSE BLD-MCNC: 175 MG/DL (ref 70–99)
GLUCOSE BLD-MCNC: 216 MG/DL (ref 70–99)
GLUCOSE BLD-MCNC: 219 MG/DL (ref 70–99)
GLUCOSE BLD-MCNC: 281 MG/DL (ref 70–99)
HCT VFR BLD AUTO: 24 % (ref 35–48)
HGB BLD-MCNC: 7.6 G/DL (ref 12–16)
IMM GRANULOCYTES # BLD AUTO: 0.05 X10(3) UL (ref 0–1)
IMM GRANULOCYTES NFR BLD: 0.8 %
LYMPHOCYTES # BLD AUTO: 0.6 X10(3) UL (ref 1–4)
LYMPHOCYTES NFR BLD AUTO: 9.5 %
MCH RBC QN AUTO: 28.3 PG (ref 26–34)
MCHC RBC AUTO-ENTMCNC: 31.7 G/DL (ref 31–37)
MCV RBC AUTO: 89.2 FL (ref 80–100)
MONOCYTES # BLD AUTO: 0.46 X10(3) UL (ref 0.1–1)
MONOCYTES NFR BLD AUTO: 7.3 %
NEUTROPHILS # BLD AUTO: 5.11 X10 (3) UL (ref 1.5–7.7)
NEUTROPHILS # BLD AUTO: 5.11 X10(3) UL (ref 1.5–7.7)
NEUTROPHILS NFR BLD AUTO: 81.2 %
OSMOLALITY SERPL CALC.SUM OF ELEC: 317 MOSM/KG (ref 275–295)
PLATELET # BLD AUTO: 153 10(3)UL (ref 150–450)
POTASSIUM SERPL-SCNC: 3.9 MMOL/L (ref 3.5–5.1)
RBC # BLD AUTO: 2.69 X10(6)UL (ref 3.8–5.3)
SODIUM SERPL-SCNC: 139 MMOL/L (ref 136–145)
WBC # BLD AUTO: 6.3 X10(3) UL (ref 4–11)

## 2025-04-21 PROCEDURE — 99233 SBSQ HOSP IP/OBS HIGH 50: CPT | Performed by: NURSE PRACTITIONER

## 2025-04-21 PROCEDURE — 99233 SBSQ HOSP IP/OBS HIGH 50: CPT | Performed by: INTERNAL MEDICINE

## 2025-04-21 NOTE — CM/SW NOTE
Stefanie from palliative care talked with pt's family and said they are going to discuss GOC for pt to determine pt's dc plan:  [facility] vs home w/HH vs hospice.  Stefanie will contact family again tomorrow for their decision regarding pt's dc plan.

## 2025-04-21 NOTE — PLAN OF CARE
Patient is alert and oriented x1, on room air. Patient showed signs of pain today, PRN menthol salicylate cream given. Meds given per MAR. Safety precautions in place, call light in reach.     Problem: METABOLIC/FLUID AND ELECTROLYTES - ADULT  Goal: Electrolytes maintained within normal limits  Description: INTERVENTIONS:- Monitor labs and rhythm and assess patient for signs and symptoms of electrolyte imbalances- Administer electrolyte replacement as ordered- Monitor response to electrolyte replacements, including rhythm and repeat lab results as appropriate- Fluid restriction as ordered- Instruct patient on fluid and nutrition restrictions as appropriate  Outcome: Progressing  Goal: Glucose maintained within prescribed range  Description: INTERVENTIONS:- Monitor Blood Glucose as ordered- Assess for signs and symptoms of hyperglycemia and hypoglycemia- Administer ordered medications to maintain glucose within target range- Assess barriers to adequate nutritional intake and initiate nutrition consult as needed- Instruct patient on self management of diabetes  Outcome: Progressing     Problem: SAFETY ADULT - FALL  Goal: Free from fall injury  Description: INTERVENTIONS:- Assess pt frequently for physical needs- Identify cognitive and physical deficits and behaviors that affect risk of falls.- [place] fall precautions as indicated by assessment.- Educate pt/family on patient safety including physical limitations- Instruct pt to call for assistance with activity based on assessment- Modify environment to reduce risk of injury- Provide assistive devices as appropriate- Consider OT/PT consult to assist with strengthening/mobility- Encourage toileting schedule  Outcome: Progressing     Problem: GASTROINTESTINAL - ADULT  Goal: Maintains or returns to baseline bowel function  Description: INTERVENTIONS:- Assess bowel function- Maintain adequate hydration with IV or PO as ordered and tolerated- Evaluate effectiveness of GI  medications- Encourage mobilization and activity- Obtain nutritional consult as needed- Establish a toileting routine/schedule- Consider collaborating with pharmacy to review patient's medication profile  Outcome: Progressing  Goal: Achieves appropriate nutritional intake (bariatric)  Description: INTERVENTIONS:- Monitor for over-consumption- Identify factors contributing to increased intake, treat as appropriate- Monitor I&O, WT and lab values- Obtain nutritional consult as needed- Evaluate psychosocial factors contributing to over-consumption  Outcome: Progressing     Problem: SKIN/TISSUE INTEGRITY - ADULT  Goal: Skin integrity remains intact  Description: INTERVENTIONS- Assess and document risk factors for pressure ulcer development- Assess and document skin integrity- Monitor for areas of redness and/or skin breakdown- Initiate interventions, skin care algorithm/standards of care as needed  Outcome: Progressing     Problem: PAIN - ADULT  Goal: Verbalizes/displays adequate comfort level or patient's stated pain goal  Description: INTERVENTIONS:- Encourage pt to monitor pain and request assistance- Assess pain using appropriate pain scale- Administer analgesics based on type and severity of pain and evaluate response- Implement non-pharmacological measures as appropriate and evaluate response- Consider cultural and social influences on pain and pain management- Manage/alleviate anxiety- Utilize distraction and/or relaxation techniques- Monitor for opioid side effects- Notify MD/LIP if interventions unsuccessful or patient reports new pain- Anticipate increased pain with activity and pre-medicate as appropriate  Outcome: Progressing

## 2025-04-21 NOTE — PHYSICAL THERAPY NOTE
PHYSICAL THERAPY TREATMENT NOTE - INPATIENT    Room Number: 430/430-A     Session: 2     Number of Visits to Meet Established Goals: 5    Presenting Problem: AMS, incr weakness, lethargic  Co-Morbidities : dementia, MI, DM, CKD, HTN    History related to current admission: Patient is a 89 year old female admitted on 4/13/2025 from home for incr lethargy,  altered mental status.  Pt diagnosed with failure to thrive, UTI.     HOME SITUATION  Type of Home: House  Home Layout: Two level, Stairlift  Stairs to Enter : 3   Railing: No         Lives With: Daughter (pt's legally blind son also lives w/ her)    Drives: No   Patient Regularly Uses: Glasses       Prior Level of [place]:   Per pt's daughter >> Pt resides in two story home with daughter and son who is legally blind. Bed/bath on second floor via stairlift. She ambulates with RW for household distances, use of wheelchair for longer distances. Family assists with 3 steps to enter via HHA. Had an unwitnessed fall (maybe a week ago), with subsequent R shoulder pain (imaging negative). Daughter reports significant decline in last week- she has been having to walk her to the bathroom. All she has been doing is ambulating to/from bed to bathroom. Daughter has been having to assist with bed mobility.   Typically her routine is to get up in the morning, get ready, go downstairs and spend the day downstairs until about 9PM.    PHYSICAL THERAPY ASSESSMENT   Patient demonstrates limited progress this session, goals  remain in progress.      Patient is requiring maximum assist and dependent as a result of the following impairments: decreased functional strength, decreased endurance/aerobic capacity, pain, impaired   balance, impaired coordination, impaired motor planning, decreased muscular endurance, cognitive deficits (dementia), medical status, and limited   ROM.     Patient continues to function below baseline with bed mobility, maintaining seated position, and  standing prolonged periods.  Next session anticipate patient to progress bed mobility, transfers, gait, stair negotiation, maintaining seated position, and standing prolonged periods.  Physical Therapy will continue to follow patient for duration of hospitalization.    Patient continues to benefit from continued skilled PT services: to promote return to prior level of function and safety with continuous assistance and gradual rehabilitative therapy .    PLAN DURING HOSPITALIZATION  Nursing Mobility Recommendation : Lift Equipment  PT Device Recommendation: Rolling walker  PT Treatment Plan: Bed mobility, Body mechanics, Endurance, Energy conservation, Family education, Patient education, Gait training, Neuromuscular re-educate, Range of motion, Strengthening, Stoop training, Stair training, Transfer training, Balance training  Frequency (Obs): 3-5x/week     CURRENT GOALS   Goal #1 Patient is able to demonstrate supine - sit EOB @ level: supervision      Goal #2 Patient is able to demonstrate transfers Sit to/from Stand at assistance level: supervision      Goal #3 Patient is able to ambulate 50 feet with assist device: walker - rolling at assistance level: supervision      Goal #4 Patient is able to ambulate 100 feet with RW at supervision.   Goal #5 Patient is able to ascend/descend 3 steps with HHA at CGA.    Goal #6     Goal Comments: Goals established on 4/14/2025 4/21/2025 all goals ongoing    SUBJECTIVE  Pt's daughter was present to translate to patient. Pt was agreeable to sitting EOB.     OBJECTIVE  Precautions: Bed/chair alarm    WEIGHT BEARING RESTRICTION     PAIN ASSESSMENT   Rating: Unable to rate  Location: entire body  Management Techniques: Repositioning, Activity promotion    BALANCE                                                                                                                       Static Sitting: Poor  Dynamic Sitting: Poor -           Static Standing: Not tested  Dynamic  Standing: Not tested    ACTIVITY TOLERANCE   Pt complained of pain by grimacing throughout the session with movements at all joints. Pt was inconsistent with following commands. Pt was reciting a prayer in Ecuadorean throughout the entire session per patient's daughter.                       O2 WALK       AM-PAC '6-Clicks' INPATIENT SHORT FORM - BASIC MOBILITY  How much difficulty does the patient currently have...  Patient Difficulty: Turning over in bed (including adjusting bedclothes, sheets and blankets)?: A Lot   Patient Difficulty: Sitting down on and standing up from a chair with arms (e.g., wheelchair, bedside commode, etc.): Unable   Patient Difficulty: Moving from lying on back to sitting on the side of the bed?: A Lot   How much help from another person does the patient currently need...   Help from Another: Moving to and from a bed to a chair (including a wheelchair)?: Total   Help from Another: Need to walk in hospital room?: Total   Help from Another: Climbing 3-5 steps with a railing?: Total     AM-PAC Score:  Raw Score: 8   Approx Degree of Impairment: 86.62%   Standardized Score (AM-PAC Scale): 28.58   CMS Modifier (G-Code): CM    FUNCTIONAL ABILITY STATUS  Gait Assessment   Functional Mobility/Gait Assessment  Gait Assistance: Not tested    Skilled Therapy Provided: Per RN okay to see pt. Pt was received in supine and with daughter's encouragement, was willing to sit EOB. Pt's daughter translated to patient during the session.    Bed Mobility:  Rolling: max A   Supine<>Sit: max A of 2; Pt was given VC's to move LE's to EOB and patient was unable to perform on own. Pt needed assistance with bringing LE's to EOB and to bring upper body into an erect posture.    Pt was max A for static sitting and dynamic sitting balance. Pt was able to perform 5 marches each LE. Pt needed AAROM assistance with bringing R UE into a semi-flexed position and was able to perform approximately 90 deg of L UE flexion on  command. Pt was able to tap toes but was inconsistent with following the command.    Sit<>Supine: max A of 2     Transfer Mobility:  Sit<>Stand: NT   Stand<>Sit: NT   Gait: NT    Therapist's Comments: Pt was educated on role of therapy and goals of session.     Patient End of Session: In bed, Needs met, Call light within reach, Hospital anti-slip socks, Alarm set, Family present    PT Session Time: 23 minutes  Therapeutic Activity: 23 minutes

## 2025-04-21 NOTE — PLAN OF CARE
Patient drowsy, easily arousable, oriented to person, disoriented to time, place ans situation. Patient with poor appetite. Patient's vital signs stable. Two new  pressure sores noted on upper back, Wound Care Nurse on consult.  Problem: METABOLIC/FLUID AND ELECTROLYTES - ADULT  Goal: Electrolytes maintained within normal limits  Description: INTERVENTIONS:- Monitor labs and rhythm and assess patient for signs and symptoms of electrolyte imbalances- Administer electrolyte replacement as ordered- Monitor response to electrolyte replacements, including rhythm and repeat lab results as appropriate- Fluid restriction as ordered- Instruct patient on fluid and nutrition restrictions as appropriate  Outcome: Progressing     Problem: GASTROINTESTINAL - ADULT  Goal: Maintains or returns to baseline bowel function  Description: INTERVENTIONS:- Assess bowel function- Maintain adequate hydration with IV or PO as ordered and tolerated- Evaluate effectiveness of GI medications- Encourage mobilization and activity- Obtain nutritional consult as needed- Establish a toileting routine/schedule- Consider collaborating with pharmacy to review patient's medication profile  Outcome: Progressing     Problem: GASTROINTESTINAL - ADULT  Goal: Achieves appropriate nutritional intake (bariatric)  Description: INTERVENTIONS:- Monitor for over-consumption- Identify factors contributing to increased intake, treat as appropriate- Monitor I&O, WT and lab values- Obtain nutritional consult as needed- Evaluate psychosocial factors contributing to over-consumption  Outcome: Not Progressing     Problem: SKIN/TISSUE INTEGRITY - ADULT  Goal: Skin integrity remains intact  Description: INTERVENTIONS- Assess and document risk factors for pressure ulcer development- Assess and document skin integrity- Monitor for areas of redness and/or skin breakdown- Initiate interventions, skin care algorithm/standards of care as needed  Outcome: Not Progressing

## 2025-04-21 NOTE — PROGRESS NOTES
[facility] Hospitalist Progress Note     PCP: Cory Sanchez DO    Chief Complaint: follow-up   Follow up for: The primary encounter diagnosis was Hypernatremia. Diagnoses of Acute on chronic renal insufficiency, Type 2 MI (myocardial infarction) (HCC), Urinary tract infection with hematuria, site unspecified, and Dehydration, severe were also pertinent to this visit.    Overnight/Interim Events:    SUBJECTIVE:  Patient seen and examined.  Daughter feeding her soup.  She waves hello and smiles.   More awake today.   Pleasantly confused.    Improving per daughter.  Denies CP/SOB.  NAD.     OBJECTIVE:  Temp:  [97.5 °F (36.4 °C)-99.2 °F (37.3 °C)] 99.2 °F (37.3 °C)  Pulse:  [67-83] 83  Resp:  [16-18] 16  BP: (132-152)/(39-72) 152/54  SpO2:  [90 %-97 %] 91 %    Intake/Output:  No intake or output data in the 24 hours ending 04/21/25 1425        Last 3 Weights   04/13/25 2256 126 lb (57.2 kg)   04/13/25 1841 126 lb (57.2 kg)   03/29/23 1434 135 lb (61.2 kg)   07/05/22 1058 135 lb 2 oz (61.3 kg)       Exam    General: no distress, appears stated age.  Awake, nonverbal    Head:  Normocephalic, without obvious abnormality, atraumatic.   Eyes:  Sclera anicteric, EOMs intact.    Nose: Nares normal,  Mucosa normal    Throat: Lips normal   Neck: Supple, symmetrical, trachea midline   Lungs:   Clear to auscultation bilaterally. Normal effort   Chest wall:  No tenderness or deformity   Heart:  Regular rate and rhythm, S1, S2 normal, no murmur, rub or gallop appreciated   Abdomen:   Soft, NT/ND, Bowel sounds normal. No masses,  No organomegaly.    Extremities: Extremities normal, atraumatic, no cyanosis or LE edema.   Skin: Skin color, texture, turgor normal. No rashes or lesions.    Neurologic: Moving all extremities spontaneously, no focal deficit appreciated.   Unable to follow commands for full neuro exam           Data Review:       Labs:     Recent Labs   Lab 04/16/25  0658 04/18/25  0628 04/19/25  0656 04/20/25  0611  04/21/25  0528   WBC 6.6 7.9 7.9 7.0 6.3   HGB 8.4* 7.4* 7.6* 7.9* 7.6*   MCV 95.3 90.1 87.9 90.3 89.2   .0* 96.0* 108.0* 134.0* 153.0       Recent Labs   Lab 04/15/25  0610 04/16/25  0658 04/17/25  0551 04/18/25  0628 04/19/25  0656 04/20/25  0611 04/21/25  0528   * 138 136 137 139 137 139   K 4.7 4.4 4.3 3.9 4.0 3.9 3.9   * 111 105 104 104 104 103   CO2 20.0* 15.0* 20.0* 22.0 23.0 22.0 23.0   * 84* 91* 84* 83* 84* 85*   CREATSERUM 5.05* 4.68* 4.56* 4.77* 4.77* 4.43* 4.30*   CA 8.4* 7.7* 8.2* 8.2* 8.4* 9.1 9.2   MG 2.8* 2.5  --  2.4  --   --   --    GLU 68* 80 206* 129* 123* 143* 160*       Recent Labs   Lab 04/19/25  0656   ALT 11   AST 22   ALB 3.2       Recent Labs   Lab 04/20/25  1052 04/20/25  1612 04/20/25  2103 04/21/25  0552 04/21/25  1257   PGLU 171* 157* 283* 175* 281*       No results for input(s): \"TROP\" in the last 168 hours.      Meds:     Scheduled Medications[1]  Medication Infusions[2]  PRN Medications[3]       Assessment/Plan:     89 year old female with PMH including but not limited to HTN, CKD, DM, HL who p/t [facility] ED c weakness and hyperNa.      Toxic Metabolic Encephalopathy POA 2/2 Uremia, hypernatremia and UTI   Weakness  FTT  Prior Falls   Lethargy - improving   - likely 2/2 hyperNa and dementia and UTI and age   - PT/OT/SLP  - follow PO intake  - CTH chronic small vessel ischemic change   - CT spine multilevel degenerative disc disease in the cervical spine   - XR hip/shoulder no fx  - CXR noted, follow clinical sxs   - infection w/u so far negative  - CXR with LLL atelectasis/pleural effusion   - IV tylenol for pain   - neurology consulted for lethargy, EEG unremarkable - encephalopathy resolved, likely toxic/metabolic, given age and underlying dementia this is likely as close as she'll get back to her baseline       Acute E.coli UTI POA, now treated  - completed CTX/ancef 1w course on 4/19   - Ucx >100K EColi  - pan sens     Acute kidney injury on CKD4:   - B/l  creatinine of 2.4-2.9 mg/dL in setting of long-standing HTN, diabetic nephropathy and advanced age. follows c Dr. Manzano   - JOSE ANTONIO likely 2/2 pre-renal azotemia  - apprec recs, IVF, improving  - dc IVF, cont diuresis per renal   - avoid nephrotoxins  - CT bladder wall thickening, no renal or ureteral stones.   - monitor renal fxn      Hypernatremia, now resolved   - initially was on hypotonic fluid per renal  - follow BMP     Hyperkalemia   - per renal, IVF      HTN c urgency   - holding arb/toresemide  - SBP up to 180s  - prn hydralazine added      # constipation  bowel regimen     # Type 2 diabetes mellitus, controlled A1c 6.0   -Hyperglycemic protocol with accu-checks QID and low-dose sliding scale insulin. Will keep 1800 ADA diet. Will hold PO meds  - stressed imporatnce of BG control and compliance c meds to prevent long-term cardiac, vascular, neuro, renal complications     # B/l UE swelling  - RUE doppler neg  - likely from IV infiltration  - compression/elevation     # Hypoglycemia x1 in AM  - protocol, likely dec PO intake   - lower SSI if persists     # GOC  - pallaitive c/s apprec, FC for now, can re-assess as needed pending clinical situation. D/w RANDI Samuel   - 04/18/25 - Advanced care planning - 16 minutes were spent discussing advanced care planning exclusive of the documented time for this visit. D/w daughter, patient nonverbal. For now will maintain full code, she will discuss with her siblings regarding GOC.     # Proph  - sqh    Dispo: med  -Lives c dtr   - DCP - medically stable, daughter/family deciding on dc to home [facility] vs. [facility], likely home with HHC tomorrow, palliative following      Lina Cardenas MD  [facility]  Hospitalist  Message over Quant the News/Peeky/Vamo  Pager: [phone]         [1]    bumetanide  2 mg Intravenous BID (Diuretic)    hydrALAZINE  50 mg Oral TID    insulin aspart  1-10 Units Subcutaneous TID AC and HS    heparin  5,000 Units Subcutaneous 2 times per day    [2] [3]   menthol/methyl salicylate    hydrALAzine    acetaminophen    metoclopramide    glucose **OR** glucose **OR** glucose-vitamin C **OR** dextrose **OR** glucose **OR** glucose **OR** glucose-vitamin C    acetaminophen    polyethylene glycol (PEG 3350)    sennosides    bisacodyl    ondansetron

## 2025-04-21 NOTE — PROGRESS NOTES
[facility]   part of [facility]     Nephrology Progress Note    nAn [place] Patient Status:  Inpatient    1925 MRN [id number]   Location [facility] 4NW-A Attending Manuel Chan MD   Hosp Day # 8 PCP Cory Sanchez DO       SUBJECTIVE:  Notes reviewed, stable this AM        Physical Exam:   /72 (BP Location: Left arm)   Pulse 77   Temp 98.6 °F (37 °C) (Oral)   Resp 16   Ht 4' 9\" (1.448 m)   Wt 126 lb (57.2 kg)   LMP  (LMP Unknown)   SpO2 97%   BMI 27.27 kg/m²   Temp (24hrs), Av.3 °F (36.8 °C), Min:97.5 °F (36.4 °C), Max:99 °F (37.2 °C)     No intake or output data in the 24 hours ending 25 0725    Last 3 Weights   25 2256 126 lb (57.2 kg)   25 1841 126 lb (57.2 kg)   23 1434 135 lb (61.2 kg)   22 1058 135 lb 2 oz (61.3 kg)   21 1412 132 lb (59.9 kg)   21 1435 133 lb (60.3 kg)     General: asleep but arouses in no apparent distress.  HEENT: No scleral icterus, MMM  Neck: Supple, no EILEEN or thyromegaly  Cardiac: Regular rate and rhythm, S1, S2 normal, no murmur or rub  Lungs: Clear without wheezes, rales, rhonchi.    Abdomen: Soft, non-tender. + bowel sounds, no palpable organomegaly  Extremities: no sig pitting edema LE  Neurologic: moving all extremities  Skin: Warm and dry, no rash        Labs:     Recent Labs   Lab 25  0658 25  0628 25  0656 25  0611 25  0528   WBC 6.6 7.9 7.9 7.0 6.3   HGB 8.4* 7.4* 7.6* 7.9* 7.6*   MCV 95.3 90.1 87.9 90.3 89.2   .0* 96.0* 108.0* 134.0* 153.0       Recent Labs   Lab 04/15/25  0610 25  0658 25  0551 25  0628 25  0656 25  0611 25  05   * 138 136 137 139 137 139   K 4.7 4.4 4.3 3.9 4.0 3.9 3.9   * 111 105 104 104 104 103   CO2 20.0* 15.0* 20.0* 22.0 23.0 22.0 23.0   * 84* 91* 84* 83* 84* 85*   CREATSERUM 5.05* 4.68* 4.56* 4.77* 4.77* 4.43* 4.30*   CA 8.4* 7.7* 8.2* 8.2* 8.4* 9.1 9.2   MG 2.8* 2.5  --   2.4  --   --   --    GLU 68* 80 206* 129* 123* 143* 160*       Recent Labs   Lab 04/19/25  0656   ALT 11   AST 22   ALB 3.2       Recent Labs   Lab 04/20/25  0508 04/20/25  1052 04/20/25  1612 04/20/25  2103 04/21/25  0552   PGLU 146* 171* 157* 283* 175*       Meds:   Current Hospital Medications[1]      Impression/Plan:      1) Acute kidney injury on CKD IV: Has long-standing chronic kidney disease stage 4 with baseline serum creatinine of 2.4-2.9 mg/dL in setting of long-standing HTN, diabetic nephropathy and advanced age. Now presenting with acute kidney injury and hypernatremia in setting of worsening mental status and poor PO intake with concomitant diuretic use. JOSE ANTONIO likely 2/2 pre-renal azotemia, had been improving slowly with IVF but with worsening volume status IVF stopped and has been receiving intermittent diuretics.  Creatinine cont to slowly improve.  No further eval, no indication and not candidate for HD      2) Hypernatremia: Due to decreased free water intake d/t worsening mental status.resolved with hypotonic fluids.      3) HTN: BP stable on home hydralazine holding home losartan and torsemide.             Sukhi Cantrell MD  4/21/2025  7:26 AM             [1]   Current Facility-Administered Medications   Medication Dose Route Frequency    bumetanide (Bumex) 0.25 MG/ML injection 2 mg  2 mg Intravenous BID (Diuretic)    hydrALAZINE (Apresoline) tab 50 mg  50 mg Oral TID    menthol/methyl salicylate (Thera-Gesic) 10-15 % cream   Topical TID PRN    hydrALAzine (Apresoline) 20 mg/mL injection 10 mg  10 mg Intravenous Q6H PRN    acetaminophen (Ofirmev) 10 mg/mL infusion premix 1,000 mg  1,000 mg Intravenous Q6H PRN    metoclopramide (Reglan) 5 mg/mL injection 5 mg  5 mg Intravenous Q8H PRN    glucose (Dex4) 15 GM/59ML oral liquid 15 g  15 g Oral Q15 Min PRN    Or    glucose (Glutose) 40% oral gel 15 g  15 g Oral Q15 Min PRN    Or    glucose-vitamin C (Dex-4) chewable tab 4 tablet  4 tablet Oral Q15  Min PRN    Or    dextrose 50% injection 50 mL  50 mL Intravenous Q15 Min PRN    Or    glucose (Dex4) 15 GM/59ML oral liquid 30 g  30 g Oral Q15 Min PRN    Or    glucose (Glutose) 40% oral gel 30 g  30 g Oral Q15 Min PRN    Or    glucose-vitamin C (Dex-4) chewable tab 8 tablet  8 tablet Oral Q15 Min PRN    insulin aspart (NovoLOG) 100 Units/mL FlexPen 1-10 Units  1-10 Units Subcutaneous TID AC and HS    heparin (Porcine) 5000 UNIT/ML injection 5,000 Units  5,000 Units Subcutaneous 2 times per day    acetaminophen (Tylenol Extra Strength) tab 500 mg  500 mg Oral Q4H PRN    polyethylene glycol (PEG 3350) (Miralax) 17 g oral packet 17 g  17 g Oral Daily PRN    sennosides (Senokot) tab 17.2 mg  17.2 mg Oral Nightly PRN    bisacodyl (Dulcolax) 10 MG rectal suppository 10 mg  10 mg Rectal Daily PRN    ondansetron (Zofran) 4 MG/2ML injection 4 mg  4 mg Intravenous Q6H PRN

## 2025-04-21 NOTE — PROGRESS NOTES
[facility]   part of formerly [facility]  Palliative Care Follow Up    Ann Herndon Patient Status:  Inpatient    1925 MRN [id number]   Location [facility] 4NW-A Attending Eric Cardenas*   Hosp Day # 8 PCP Cory Sanchez,    [id number]    Date of visit:  2025  Day 8 of hospitalization.     Palliative care consult requested by Dr. Nielsen for support with goals of care discussions.     Summary:         HPI:  Ann Herndon is a 89 year old female with PMH significant for CKD-4, ?dementia, HTN, DM, HL presented to ED on 2025 with CC of weakness w recent falls at home, poor PO.  Initial workup revealed UTI, JOSE ANTONIO (Ct 5.32), hypernatremia (158), hyperkalemia (5.4), elevated troponin, constipation, L gluteal wound. Head, spine, hip/shoulder imaging neg for acute processes. Hospital course includes renal on consult, wound on consult, bowel regimen, RUE swelling - doppler neg. Received iv hydralazine this AM.     Therapy recommendations are [facility], however, family preference for her to return home.   SLP recommends soft/easy to chew solids and thin liquids.        Interval Events: mental status / alertness improved. Renal input reviewed.    SUBJECTIVE  Review of Systems - Palliative Care Symptom Assessment     I visited Ann with son Rex at bedside. She was asleep, awakened briefly, smiled and told me she felt \"ok.\"    OBJECTIVE     Hematology:   Lab Results   Component Value Date    WBC 6.3 2025    HGB 7.6 (L) 2025    HCT 24.0 (L) 2025    .0 2025       Chemistry:  Lab Results   Component Value Date    CREATSERUM 4.30 (H) 2025    BUN 85 (H) 2025     2025    K 3.9 2025     2025    CO2 23.0 2025     (H) 2025    CA 9.2 2025    ALB 3.2 2025    ALKPHO 72 2025    BILT 0.2 2025    TP 5.8 2025    AST 22 2025    ALT 11 2025    DDIMER 0.82 (H) 2021     BNP 17 01/12/2013    MG 2.4 04/18/2025    TROP <0.045 03/19/2021       Imaging:  US VENOUS DOPPLER ARM LEFT - DIAG IMG (CPT=93971)  Result Date: 4/20/2025  CONCLUSION:  No evidence of DVT in the left upper extremity.   LOCATION:  [facility]   Dictated by (CST): Cristo Hawk MD on 4/20/2025 at 11:12 AM     Finalized by (CST): Cristo Hawk MD on 4/20/2025 at 11:12 AM         Vital Signs:  Blood pressure 144/48, pulse 67, temperature 99 °F (37.2 °C), temperature source Axillary, resp. rate 18, height 4' 9\" (1.448 m), weight 126 lb (57.2 kg), SpO2 94%, not currently breastfeeding.  Body mass index is 27.27 kg/m².      Physical Exam:     GENERAL: Fatigued. NAD.  RESPIRATORY: no increased effort  EXTREMITIES: edematous - improving  NEUROLOGIC: sleepy but interactive  PSYCHIATRIC:  resting  SKIN: Warm and dry.     Palliative Performance Scale: 40%  Palliative Performance Scale   % Ambulation Activity Level Self-Care Intake Consciousness   100 Full Normal Full   Normal Full   90 Full No disease  Normal Full Normal Full   80 Full Some disease  Normal w/effort Full Normal or  Reduced Full   70 Reduced Some disease  Can't perform job Full Normal or   Reduced Full   60 Reduced Significant disease  Can't perform hobby Occasional  Assist Normal or   Reduced Full or confused   50 Mainly sit/lie Extensive Disease  Can't do any work Partial Assist Normal or Reduced Full or confused   40 Mainly in bed Extensive Disease  Can't do any work Mainly Assist Normal or Reduced Full or confused   30 Bedbound Extensive Disease  Can't do any work Max Assist  Total Care Reduced Drowsy/confused   20 Bedbound Extensive Disease  Can't do any work Max Assist  Total Care Minimal Drowsy/confused   10 Bedbound/coma Extensive Disease  Can't do any work  coma  Max Assist  Total Care Mouth care Drowsy or coma   0 Death       Palliative Care Assessment:     Goals of Care Discussion:     Rex shared his concern for ability to care for Ann at  home. He understands the options and pathways going forward - his wife went to [facility] in attempt to gain strength, but ultimately ended up under hospice care.  He would support a [facility] stay at OR if that's what Rubi favors. Ultimately he feels that she is unable to be cared for at home, which is what they were previously hoping for.    I'll return to revisit when Rubi is present.    145pm - returned when Rubi was feeding Ann lunch. She states she takes more food, but still needs 1:1 support w feeding.   She reported plan to d/w her siblings regarding direction of her care. She fears that a new setting may disrupt Ann more than it will benefit her, and is struggling the right decision for her.    She feels she might be able to take her home but would need a lot more support + CG. We discussed options for home w support vs [facility] +/- PC. Also informed that if goal is to take her home and if it is felt that further tx + hospital stays are no longer helpful, hospice can be considered.     She will take time to consider further and inform tomorrow if any decisions are made or if any further needs.    Emotional support provided.    Problem List:       Principal Problem:    Hypernatremia  Active Problems:    Hyperkalemia    Essential hypertension    Urinary tract infection with hematuria, site unspecified    CKD (chronic kidney disease) stage 4, GFR 15-29 ml/min (HCC)    Acute on chronic renal insufficiency    Type 2 MI (myocardial infarction) ([facility])    Dehydration, severe    Acidosis    Palliative care encounter    Counseling regarding advance care planning and goals of care    Encephalopathy acute        Palliative Care Recommendations/Plan:         Goals of Care:  Continue medical management.  Family to discuss the direction of her care amongst themselves later this evening, and will inform tomorrow of decisions or if further support is needed.  Deciding between home + CG support vs [facility]/SNF. Also discussed hospice if home  w comfort/no rehospitalization is the GOC.   If treatment-focus continues to be GOC, agree w [facility] [place]. SW consult for referral was placed by hospitalist.    Code Status: Full Code.   Previously addressed. Daughter considering based on clinical course.    HCPOA: DaughterRubi    Symptoms - Pain in extremities - improved today: tylenol prn, Theragesic prn    Disposition:  Pending clinical course, anticipating home w family at [place].      A total of 50 minutes were spent on this visit, which included all of the following:  Chart review, direct face to face contact, history taking, physical examination, counseling and coordinating care, and documentation.        Reviewed the above with patient's RN, SW, therapy, and hospitalist.    Thank you for inviting Palliative Care to participate in the care of Ann Herndon and family.       Will follow.      RANDI Ann  Palliative Care    4/21/2025  1:58 PM    The 21st Century Cures Act makes medical notes like these available to patients in the interest of transparency. Please be advised this is a medical document. Medical documents are intended to carry relevant information, facts as evident, and the clinical opinion of the practitioner. The medical note is intended as a peer to peer communication, and may appear blunt or direct. It is written in medical language and may contain abbreviations or verbiage that are unfamiliar.

## 2025-04-22 LAB
ANION GAP SERPL CALC-SCNC: 17 MMOL/L (ref 0–18)
BUN BLD-MCNC: 90 MG/DL (ref 9–23)
CALCIUM BLD-MCNC: 9.6 MG/DL (ref 8.7–10.6)
CHLORIDE SERPL-SCNC: 102 MMOL/L (ref 98–112)
CO2 SERPL-SCNC: 23 MMOL/L (ref 21–32)
CREAT BLD-MCNC: 4.54 MG/DL (ref 0.55–1.02)
EGFRCR SERPLBLD CKD-EPI 2021: 8 ML/MIN/1.73M2 (ref 60–?)
GLUCOSE BLD-MCNC: 175 MG/DL (ref 70–99)
GLUCOSE BLD-MCNC: 192 MG/DL (ref 70–99)
GLUCOSE BLD-MCNC: 204 MG/DL (ref 70–99)
GLUCOSE BLD-MCNC: 223 MG/DL (ref 70–99)
GLUCOSE BLD-MCNC: 259 MG/DL (ref 70–99)
OSMOLALITY SERPL CALC.SUM OF ELEC: 327 MOSM/KG (ref 275–295)
POTASSIUM SERPL-SCNC: 3.8 MMOL/L (ref 3.5–5.1)
SODIUM SERPL-SCNC: 142 MMOL/L (ref 136–145)

## 2025-04-22 PROCEDURE — 99233 SBSQ HOSP IP/OBS HIGH 50: CPT | Performed by: NURSE PRACTITIONER

## 2025-04-22 PROCEDURE — 99233 SBSQ HOSP IP/OBS HIGH 50: CPT | Performed by: INTERNAL MEDICINE

## 2025-04-22 PROCEDURE — G0316 PROLNG IP/OBS E/M EA ADDL 15 MIN: HCPCS | Performed by: NURSE PRACTITIONER

## 2025-04-22 RX ORDER — DILTIAZEM HYDROCHLORIDE 5 MG/ML
10 INJECTION INTRAVENOUS ONCE
Status: COMPLETED | OUTPATIENT
Start: 2025-04-22 | End: 2025-04-22

## 2025-04-22 NOTE — PLAN OF CARE
Patient is alert and oriented x1, on room air. Meds given per MAR. Goals of care ongoing, family to follow up with social work. Safety precautions in place, call light in reach.     0415: HR increased to 140s-150s. EKG showing SVT. MD notified, cards consulted. Meds per order, see MAR.     Problem: METABOLIC/FLUID AND ELECTROLYTES - ADULT  Goal: Electrolytes maintained within normal limits  Description: INTERVENTIONS:- Monitor labs and rhythm and assess patient for signs and symptoms of electrolyte imbalances- Administer electrolyte replacement as ordered- Monitor response to electrolyte replacements, including rhythm and repeat lab results as appropriate- Fluid restriction as ordered- Instruct patient on fluid and nutrition restrictions as appropriate  Outcome: Progressing  Goal: Glucose maintained within prescribed range  Description: INTERVENTIONS:- Monitor Blood Glucose as ordered- Assess for signs and symptoms of hyperglycemia and hypoglycemia- Administer ordered medications to maintain glucose within target range- Assess barriers to adequate nutritional intake and initiate nutrition consult as needed- Instruct patient on self management of diabetes  Outcome: Progressing     Problem: SAFETY ADULT - FALL  Goal: Free from fall injury  Description: INTERVENTIONS:- Assess pt frequently for physical needs- Identify cognitive and physical deficits and behaviors that affect risk of falls.- [place] fall precautions as indicated by assessment.- Educate pt/family on patient safety including physical limitations- Instruct pt to call for assistance with activity based on assessment- Modify environment to reduce risk of injury- Provide assistive devices as appropriate- Consider OT/PT consult to assist with strengthening/mobility- Encourage toileting schedule  Outcome: Progressing     Problem: GASTROINTESTINAL - ADULT  Goal: Maintains or returns to baseline bowel function  Description: INTERVENTIONS:- Assess bowel  function- Maintain adequate hydration with IV or PO as ordered and tolerated- Evaluate effectiveness of GI medications- Encourage mobilization and activity- Obtain nutritional consult as needed- Establish a toileting routine/schedule- Consider collaborating with pharmacy to review patient's medication profile  Outcome: Progressing  Goal: Achieves appropriate nutritional intake (bariatric)  Description: INTERVENTIONS:- Monitor for over-consumption- Identify factors contributing to increased intake, treat as appropriate- Monitor I&O, WT and lab values- Obtain nutritional consult as needed- Evaluate psychosocial factors contributing to over-consumption  Outcome: Progressing     Problem: SKIN/TISSUE INTEGRITY - ADULT  Goal: Skin integrity remains intact  Description: INTERVENTIONS- Assess and document risk factors for pressure ulcer development- Assess and document skin integrity- Monitor for areas of redness and/or skin breakdown- Initiate interventions, skin care algorithm/standards of care as needed  Outcome: Progressing     Problem: PAIN - ADULT  Goal: Verbalizes/displays adequate comfort level or patient's stated pain goal  Description: INTERVENTIONS:- Encourage pt to monitor pain and request assistance- Assess pain using appropriate pain scale- Administer analgesics based on type and severity of pain and evaluate response- Implement non-pharmacological measures as appropriate and evaluate response- Consider cultural and social influences on pain and pain management- Manage/alleviate anxiety- Utilize distraction and/or relaxation techniques- Monitor for opioid side effects- Notify MD/LIP if interventions unsuccessful or patient reports new pain- Anticipate increased pain with activity and pre-medicate as appropriate  Outcome: Progressing

## 2025-04-22 NOTE — PLAN OF CARE
Patient lethargic. VSS, remained afebrile. Meds given per MAR. Cardizem drip infusing. Poor appetite. Family remained at bedside. Transferred to specialty bed to help wounds on sacrum and mid back. Dressings C/D/I. Fall precautions in place. Call light within reach.     Problem: METABOLIC/FLUID AND ELECTROLYTES - ADULT  Goal: Electrolytes maintained within normal limits  Description: INTERVENTIONS:- Monitor labs and rhythm and assess patient for signs and symptoms of electrolyte imbalances- Administer electrolyte replacement as ordered- Monitor response to electrolyte replacements, including rhythm and repeat lab results as appropriate- Fluid restriction as ordered- Instruct patient on fluid and nutrition restrictions as appropriate  Outcome: Progressing  Goal: Glucose maintained within prescribed range  Description: INTERVENTIONS:- Monitor Blood Glucose as ordered- Assess for signs and symptoms of hyperglycemia and hypoglycemia- Administer ordered medications to maintain glucose within target range- Assess barriers to adequate nutritional intake and initiate nutrition consult as needed- Instruct patient on self management of diabetes  Outcome: Progressing     Problem: GASTROINTESTINAL - ADULT  Goal: Maintains or returns to baseline bowel function  Description: INTERVENTIONS:- Assess bowel function- Maintain adequate hydration with IV or PO as ordered and tolerated- Evaluate effectiveness of GI medications- Encourage mobilization and activity- Obtain nutritional consult as needed- Establish a toileting routine/schedule- Consider collaborating with pharmacy to review patient's medication profile  Outcome: Progressing  Goal: Achieves appropriate nutritional intake (bariatric)  Description: INTERVENTIONS:- Monitor for over-consumption- Identify factors contributing to increased intake, treat as appropriate- Monitor I&O, WT and lab values- Obtain nutritional consult as needed- Evaluate psychosocial factors contributing  to over-consumption  Outcome: Progressing     Problem: SKIN/TISSUE INTEGRITY - ADULT  Goal: Skin integrity remains intact  Description: INTERVENTIONS- Assess and document risk factors for pressure ulcer development- Assess and document skin integrity- Monitor for areas of redness and/or skin breakdown- Initiate interventions, skin care algorithm/standards of care as needed  Outcome: Progressing     Problem: SAFETY ADULT - FALL  Goal: Free from fall injury  Description: INTERVENTIONS:- Assess pt frequently for physical needs- Identify cognitive and physical deficits and behaviors that affect risk of falls.- [place] fall precautions as indicated by assessment.- Educate pt/family on patient safety including physical limitations- Instruct pt to call for assistance with activity based on assessment- Modify environment to reduce risk of injury- Provide assistive devices as appropriate- Consider OT/PT consult to assist with strengthening/mobility- Encourage toileting schedule  Outcome: Progressing     Problem: PAIN - ADULT  Goal: Verbalizes/displays adequate comfort level or patient's stated pain goal  Description: INTERVENTIONS:- Encourage pt to monitor pain and request assistance- Assess pain using appropriate pain scale- Administer analgesics based on type and severity of pain and evaluate response- Implement non-pharmacological measures as appropriate and evaluate response- Consider cultural and social influences on pain and pain management- Manage/alleviate anxiety- Utilize distraction and/or relaxation techniques- Monitor for opioid side effects- Notify MD/LIP if interventions unsuccessful or patient reports new pain- Anticipate increased pain with activity and pre-medicate as appropriate  Outcome: Progressing

## 2025-04-22 NOTE — DIETARY NOTE
[facility]   part of [facility]    NUTRITION ASSESSMENT    Unable to diagnose malnutrition criteria at this time.    NUTRITION INTERVENTION:    Meal and Snacks - Continue Regular diet with soft/easy to chew modifier per SLP. Diet as tolerated; monitor patient po intake. Encourage adequate po of appropriate diet.  Medical Food Supplements - RD added Ensure Plus High Protein BID. Rationale/use for oral supplements discussed.    PATIENT STATUS:   4/22- Pt continues with poor appetite and AMS. Pt consumed 1 meal yesterday, 50% and an ONS. Continue 1:1 feed and po encouragement. Follow POC / GOC. Palliative following. Follow and support as able.     4/17/25 Visited patient in room, daughter at bedside. Interview done in Estonian, daughter providing information since patient is sleeping. Reports decreased appetite continues. Documented variable PO intake (5-60% of meals) Yesterday she had 2 meals - good PO intake with breakfast, for dinner daughter ordered thin pizza and offered center pieces that where softer, took bites only and apple sauce. This morning she has been sleeping and hasn't had a meal.  Noted  unopened ONS in room, will change to Ensure since K and Mg are now WNL and will increase frequency.    04/14/25  Interview done in Estonian, qualified bilingual staff member as of 7/18/2024 on a passing score of a Language Line proficiency test.    89 yo female. Presented with AMS, family notes progressive decline over 1 week. Decreased PO intake. Admitted on 4/13 with JOSE ANTONIO and hypernatremia.  MST triggered to be seen. Visited patient in room, daughter at bedside and providing most information. Patient mostly with eyes closed.  Patient has not been eating much over the past week, daughter was offering Ensure to supplement intake. Daughter and patient agreeable to ONS during admission, Nepro indicated with current labs.  Per daughter before current symptoms, patient was having 3 meals per day with good PO  intake and has maintained weight 131lbs  +-5 lbs. Weight was taken at PCP last week - 126lbs.   SLP following.    Pmhx - Includes dementia, DM, HTN, CKD, HLD    ANTHROPOMETRICS:  Ht: 144.8 cm (4' 9\")  Wt: 54.8 kg (120 lb 12.8 oz).   BMI: Body mass index is 26.14 kg/m².  IBW: 45.5 kg    WEIGHT HISTORY:   Weight loss: No, per daughter's reported UBW range.     Wt Readings from Last 10 Encounters:   04/22/25 54.8 kg (120 lb 12.8 oz)   03/29/23 61.2 kg (135 lb)   07/05/22 61.3 kg (135 lb 2 oz)   11/03/21 59.9 kg (132 lb)   11/02/21 60.3 kg (133 lb)   07/08/21 58.1 kg (128 lb)   04/22/21 57.6 kg (127 lb)   04/15/21 56.7 kg (125 lb)   03/19/21 59 kg (130 lb)   02/17/21 59 kg (130 lb)      NUTRITION:  Diet:       Procedures    Regular/General diet Texture Consistency: Soft / Easy to Chew ; Is Patient on Accuchecks? Yes      Food Allergies: No  Cultural/Ethnic/Church Preferences Addressed: Yes    Percent Meals Eaten (last 3 days)       Date/Time Percent Meals Eaten (%)    04/20/25 0900 15 %    04/21/25 1111 50 %          GI system review: constipation and decreased appetite  Last BM Date: 04/19/25  Skin and wounds: pressure injury on sacrum documented by RN    NUTRITION RELATED PHYSICAL FINDINGS:     1. Body Fat/Muscle Mass: no wasting noted      2. Fluid Accumulation:  RUE, LUE, right and left hands non pitting edema documented by RN.    NUTRITION PRESCRIPTION:   57.2 kg Actual Body Weight  Calories: 1258 - 1430 calories/day (22-25 kcal/kg)  Protein: 63 - 69 grams protein/day ( 1.1 - 1.2  grams protein per kg)  Fluid: ~1 ml/kcal or per MD discretion    NUTRITION DIAGNOSIS/PROBLEM:  Inadequate oral intake related to insufficient appetite resulting in inadequate nutrition intake as evidenced by documented/reported insufficient oral intake    MONITOR AND EVALUATE/NUTRITION GOALS:  PO intake of 75% of meals TID - Not met, Continues  PO intake of 75% of oral nutrition supplement/s - Not met, Continues  Weight stable within 1  to 2 lbs during admission - Ongoing    MEDICATIONS:  Novolog ssi  Gtt: Sodium bicarb in D5 @ 75mL/hr (~306 kcals in 24hrs)     LABS:  reviewed    Pt is at Moderate nutrition risk    Jemima Davies RD, LDN  Clinical Dietitian

## 2025-04-22 NOTE — SPIRITUAL CARE NOTE
Spiritual Care Visit Note    Patient Name: Ann Herndon Date of Spiritual Care Visit: 25   : 1925 Primary Dx: Hypernatremia       Referred By: Referral From: Nurse    Spiritual Care Taxonomy:    Intended Effects: Demonstrate caring and concern    Methods: Collaborate with care team member, Explore spiritual/Taoist beliefs    Interventions: Active listening, Ask guided questions, Explain  role, Prayer for healing, Provide hospitality    Visit Type/Summary:     - Spiritual Care: Consulted with RN prior to visit. Offered empathic listening and emotional support. Provided information regarding how to contact Spiritual Care and left a Spiritual Care information card.  remains available as needed for follow up. Upon entering the patient's room I observed the patient sitting up in bed daughter sitting at bedside. The patient's daughter stated that her mother is very weak and she started to declined two weeks ago.The patient's daughter also said that her mother use to be active around the house but all she does now is sleep. The daughter also said her mother turned 89 this year and they celebrated her mother's birthday in [facility]. The stated that her mother is Nondenominational and attended [employer] at [place] and [place] but because of her declined she watches mass on television.  provided spiritual and emotional support to the patient and daughter. Also  provided a prayer for the patient and family. The patient and family expressed their gratitude for the  visit.     Spiritual Care support can be requested via an Epic consult. For urgent/immediate needs, please contact the On Call  at: [facility]: ext [phone]    Chaplain Resident Fabienne Wilkins MA

## 2025-04-22 NOTE — PROGRESS NOTES
[facility]   part of [facility]     Nephrology Progress Note    Ann Herndon Patient Status:  Inpatient    1925 MRN [id number]   Location [facility] 4NW-A Attending Manuel Chan MD   Hosp Day # 9 PCP Cory Sanchez DO       SUBJECTIVE:  No acute events; son @ bedside feeding pt;   Limited historian (pt)      Current Hospital Medications[1]          Physical Exam:   /44 (BP Location: Right arm)   Pulse 73   Temp 98.6 °F (37 °C) (Oral)   Resp 16   Ht 4' 9\" (1.448 m)   Wt 120 lb 12.8 oz (54.8 kg)   LMP  (LMP Unknown)   SpO2 95%   BMI 26.14 kg/m²   Temp (24hrs), Av.9 °F (37.2 °C), Min:98.4 °F (36.9 °C), Max:99.5 °F (37.5 °C)       Intake/Output Summary (Last 24 hours) at 2025 0810  Last data filed at 2025 0721  Gross per 24 hour   Intake 12.2 ml   Output 650 ml   Net -637.8 ml       Last 3 Weights   25 0739 120 lb 12.8 oz (54.8 kg)   25 2256 126 lb (57.2 kg)   25 1841 126 lb (57.2 kg)   23 1434 135 lb (61.2 kg)   22 1058 135 lb 2 oz (61.3 kg)   21 1412 132 lb (59.9 kg)   21 1435 133 lb (60.3 kg)     General: NAD; awake; alert; interacting w/ son  HEENT: No scleral icterus, MMM  Neck: Supple, no EILEEN or thyromegaly  Cardiac: Regular rate and rhythm, S1, S2 normal, no murmur or rub  Lungs: Clear without wheezes, rales, rhonchi.    Abdomen: Soft, non-tender. + bowel sounds, no palpable organomegaly  Extremities: no sig pitting edema LE  Neurologic: moving all extremities  Skin: Warm and dry, no rash        Recent Labs     25  0611 25  0528   WBC 7.0 6.3   HGB 7.9* 7.6*   MCV 90.3 89.2   .0* 153.0       Recent Labs     25  0611 25  0528 25  0606    139 142   K 3.9 3.9 3.8    103 102   CO2 22.0 23.0 23.0   BUN 84* 85* 90*   CREATSERUM 4.43* 4.30* 4.54*   CA 9.1 9.2 9.6       No results for input(s): \"ALT\", \"AST\", \"ALB\", \"AMYLASE\", \"LIPASE\", \"LDH\" in the last 72  hours.    Invalid input(s): \"ALPHOS\", \"TBIL\", \"DBIL\", \"TPROT\"      Impression/Plan:      1. Acute kidney injury on CKD IV: Has long-standing chronic kidney disease stage 4 with baseline serum creatinine of 2.4-2.9 mg/dL in setting of long-standing HTN as of 2021- diabetic nephropathy and advanced age. Now presenting with acute kidney injury and hypernatremia in setting of worsening mental status and poor PO intake with concomitant diuretic use. JOSE ANTONIO likely 2/2 pre-renal azotemia, had been improving slowly with IVF but with worsening volume status IVF stopped and has been receiving intermittent diuretics.   -  Monitor labs;  No further eval, no indication and not candidate for HD      2. Hypernatremia: Due to decreased free water intake d/t worsening mental status.resolved with hypotonic fluids.  Encourage oral intake as able safely     3.HTN: BP stable on home hydralazine       Trev Kristine  4/22/2025            [1]   Current Facility-Administered Medications   Medication Dose Route Frequency    dilTIAZem (cardIZEM) 100 mg in sodium chloride 0.9% 100 mL IVPB-ADDV  5 mg/hr Intravenous Continuous    [Held by provider] hydrALAZINE (Apresoline) tab 50 mg  50 mg Oral TID    menthol/methyl salicylate (Thera-Gesic) 10-15 % cream   Topical TID PRN    hydrALAzine (Apresoline) 20 mg/mL injection 10 mg  10 mg Intravenous Q6H PRN    acetaminophen (Ofirmev) 10 mg/mL infusion premix 1,000 mg  1,000 mg Intravenous Q6H PRN    metoclopramide (Reglan) 5 mg/mL injection 5 mg  5 mg Intravenous Q8H PRN    glucose (Dex4) 15 GM/59ML oral liquid 15 g  15 g Oral Q15 Min PRN    Or    glucose (Glutose) 40% oral gel 15 g  15 g Oral Q15 Min PRN    Or    glucose-vitamin C (Dex-4) chewable tab 4 tablet  4 tablet Oral Q15 Min PRN    Or    dextrose 50% injection 50 mL  50 mL Intravenous Q15 Min PRN    Or    glucose (Dex4) 15 GM/59ML oral liquid 30 g  30 g Oral Q15 Min PRN    Or    glucose (Glutose) 40% oral gel 30 g  30 g Oral Q15 Min PRN    Or     glucose-vitamin C (Dex-4) chewable tab 8 tablet  8 tablet Oral Q15 Min PRN    insulin aspart (NovoLOG) 100 Units/mL FlexPen 1-10 Units  1-10 Units Subcutaneous TID AC and HS    heparin (Porcine) 5000 UNIT/ML injection 5,000 Units  5,000 Units Subcutaneous 2 times per day    acetaminophen (Tylenol Extra Strength) tab 500 mg  500 mg Oral Q4H PRN    polyethylene glycol (PEG 3350) (Miralax) 17 g oral packet 17 g  17 g Oral Daily PRN    sennosides (Senokot) tab 17.2 mg  17.2 mg Oral Nightly PRN    bisacodyl (Dulcolax) 10 MG rectal suppository 10 mg  10 mg Rectal Daily PRN    ondansetron (Zofran) 4 MG/2ML injection 4 mg  4 mg Intravenous Q6H PRN

## 2025-04-22 NOTE — CM/SW NOTE
Care Progression Note:  Length of stay: 9  GMLOS: 4.4  Avoidable Delays:   Code Status: FULL    Acute Medical Issue/Factors:   Hypernatremia   Toxic Metabolic Encephalopathy POA 2/2 Uremia, hypernatremia and UTI   Weakness    Discharge Barriers: SVT last night, started on cardizem gtts.  Transition to po BB tomorrow. Monitoring over night.     Expected discharge date: 4/23.    Expected next site of care: TBD. Family deciding on [facility] vs home with HH and private CG.     12:15  [facility] referral sent via A[facility], PASJOSH complete and uploaded. Will need to discuss [facility] choice with family.     / available for discharge planning.

## 2025-04-22 NOTE — CONSULTS
[facility]  Report of Consultation    Ann Herndon Patient Status:  Inpatient    1925 MRN [id number]   [facility] 4NW-A Attending Eric Cardenas*   Hosp Day # 9 PCP Cory Sanchez DO     Reason for Consultation:   SVT / +Troponin    History of Present Illness:   Ann Herndon is a(n) 89 year old female with a past history of HTN, HL, DM II, MADHU on nocturnal O2, CRI, and mild carotid disease.  Pt presented with weakness and decline in overall medical condition.  Pt travelled to [facility] in  to celebrate her 89th birthday.  Since returning has had gradual decline and more so in the last week.  Pt also has Hx of dementia. Hx Is largely obtained by er son at bedside.  Brought to the ER in response to her decline.  Noted to demonstrate AQUILES and hypernatremia.  Significant anemia noted./  In addition +Troponin.  Pt has reported no CP or SOB.  Hx is unreliable however.  Pt had been on diuretic Rx with poor PO intake, likely contributing to AQUILES.  Pt has been hospitalized - IV fluid provided and diuretics ultimately per renal.  Last night SVT noted with rate of 130-140.  IV Cardizem provided ultimately with reversion back to SR.  Pt is sleepy this AM and drifts off to sleep.  She is unable to convey if any palpitations or Sx during the time of SVT.  Appears comfortable at this time.        Past Medical History:   Past Medical History:   Anemia    Arthritis    Back problem    herniated discs L4-L5    Blood disorder    anemia    Cellulitis, leg    Dementia (HCC)    Depression    Elevated blood pressure reading without diagnosis of hypertension    Esophageal reflux    Gastritis    GENITO-URINARY DISEASE    Glaucoma    HIGH BLOOD PRESSURE    HIGH CHOLESTEROL    Kidney disease    Lumbar degenerative disc disease    Lumbar radiculitis    Lumbar spondylosis    MADHU (obstructive sleep apnea)    AHI 5 RDI 5 REM AHI 0 Supine AHI 2 non-supine AHI 6     MADHU (obstructive sleep apnea)     Osteoarthrosis, unspecified whether generalized or localized, unspecified site    Other and unspecified hyperlipidemia    Pain in limb    Personal history of urinary (tract) infection    below th eknee    Renal disorder    elevated BUN and creatinine    Shortness of breath    uses oxygen 2.5 L/NC every night for hx of low saturation identified during sleep study 2014    Spinal stenosis    Type II or unspecified type diabetes mellitus without mention of complication, not stated as uncontrolled    Unspecified acute reaction to stress    Unspecified essential hypertension    Visual impairment    glasses       Social History:   Smoking:  None  Alcohol:  None    Family History:   No family history of premature arthrosclerotic heart disease     Medications:   Scheduled:   Scheduled Medications[1]    Continuous Infusion:   Medication Infusions[2]    PRN Medications:   PRN Medications[3]    Outpatient Medications:   Medications Ordered Prior to Encounter[4]    Allergies:   Allergies[5]    Review of Systems:   No fevers, chills, change in weight or bowel habits.  Ten point review of systems is otherwise negative or unremarkable.    Physical Exam:   Vitals:    04/22/25 0739   BP: 126/44   Pulse: 73   Resp:    Temp: 98.6 °F (37 °C)     Wt Readings from Last 3 Encounters:   04/22/25 120 lb 12.8 oz (54.8 kg)   03/29/23 135 lb (61.2 kg)   07/05/22 135 lb 2 oz (61.3 kg)           General: Elderly frail appearing female.  Pt is in no acute distress.  HEENT:   Normocephalic.  Atraumatic.  Eyes with no scleral icterus.  Neck: Supple.  No JVD.  Carotids 2+ and equal in symmetric fashion.  No bruits are noted.  Cardiac: Regular rate and rhythm.   There is a normal S1 and S2.  No S3 or S4.  No murmurs, rubs, or gallops.  PMI is non-displaced with a normal apical impulse.  Lungs: Clear to ascultation bilaterally.  No focal rales, rhonchi, or wheezes.  Good air movement is noted throughout all lung fields.  Abdomen: Soft.  Non-distended.   Non-tender.  Bowel sounds are present and normoactive.  No guarding or rebound.   Extremities: Extremities do not demonstrate any evidence of peripheral edema.   Some muscular atrophy of BLE.  Neurologic: Sleepy but arousable.  Not interacting verbally to any substantial degree.  No gross deficit appreciated.  Integument:  No visible rashes are appreciated.      Laboratories and Data:   Labs:    Recent Labs   Lab 04/19/25  0656 04/20/25  0611 04/21/25  0528 04/22/25  0606   * 143* 160* 192*   BUN 83* 84* 85* 90*   CREATSERUM 4.77* 4.43* 4.30* 4.54*   CA 8.4* 9.1 9.2 9.6   ALB 3.2  --   --   --     137 139 142   K 4.0 3.9 3.9 3.8    104 103 102   CO2 23.0 22.0 23.0 23.0   ALKPHO 72  --   --   --    AST 22  --   --   --    ALT 11  --   --   --    BILT 0.2  --   --   --    TP 5.8  --   --   --        Recent Labs   Lab 04/19/25  0656 04/20/25  0611 04/21/25  0528   RBC 2.64* 2.78* 2.69*   HGB 7.6* 7.9* 7.6*   HCT 23.2* 25.1* 24.0*   MCV 87.9 90.3 89.2   MCH 28.8 28.4 28.3   MCHC 32.8 31.5 31.7   RDW 12.6 13.0 12.9   NEPRELIM 6.75 5.95 5.11   WBC 7.9 7.0 6.3   .0* 134.0* 153.0       No results for input(s): \"PTP\", \"INR\" in the last 168 hours.    Recent Labs   Lab 04/18/25  0628   CK 86       Diagnostics:   Tele:  SVT ->SR  EKG: .  ST and T abn.  Cannot R/O AWMI.age undetermined.        Assessment:   SVT  -Reversion back to SR with IV CCB  2.     +Troponin   -No clear anginal Sx   -May reflect AQUILES or possibly PSVT  3.      AQUILES on CRI  4.     Hypernatremia on presentation   -Improved  5.     HTN  6.     HL  7.     DM II  8.     UTI  9.     Abn ECG  10.   Carotid plaque noted in the past  11.   Advanced age  12.   Dementia  13.   Deconditioning / debility  14.   Anemia   -Hb 7.6          Plan:   IV CCB today   -Transition to PO BB if stable for next 24 hours and taking PO adequately.  2.     Stop Hydralazine to allow tolerance of CCB / BB  3.     IV fluids / diuretics per renal  4.      Reviewed findings with pt and son at bedside.  Reviewed options of care.  Son ins incomplete agreement with conservative approach with medical Rx alone at this time.  Defer cardiodiagnostic testing, accepting of limitations in medical Rx alone and potential risks.  5.     Establish goals of care with primary Svc / palliative care team.  6.  Tele monitor.    Brian Camp MD  4/22/2025  9:11 AM         [1]    [Held by provider] hydrALAZINE  50 mg Oral TID    insulin aspart  1-10 Units Subcutaneous TID AC and HS    heparin  5,000 Units Subcutaneous 2 times per day   [2]    dilTIAZem 5 mg/hr (04/22/25 9421)   [3]   menthol/methyl salicylate    hydrALAzine    acetaminophen    metoclopramide    glucose **OR** glucose **OR** glucose-vitamin C **OR** dextrose **OR** glucose **OR** glucose **OR** glucose-vitamin C    acetaminophen    polyethylene glycol (PEG 3350)    sennosides    bisacodyl    ondansetron  [4]   No current facility-administered medications on file prior to encounter.     Current Outpatient Medications on File Prior to Encounter   Medication Sig Dispense Refill    gabapentin 100 MG Oral Cap       glimepiride 1 MG Oral Tab       hydrALAZINE 50 MG Oral Tab       losartan 50 MG Oral Tab       sertraline 100 MG Oral Tab       torsemide 20 MG Oral Tab       Multiple Vitamins-Minerals (TAB-A-OMI) Oral Tab Take 1 tablet by mouth in the morning.      Cholecalciferol (VITAMIN D3) 25 MCG (1000 UT) Oral Cap Take 1 tablet by mouth in the morning.      Multiple Vitamins-Minerals (PRESERVISION AREDS) Oral Tab Take 1 tablet by mouth in the morning and 1 tablet before bedtime.      ferrous sulfate 325 (65 FE) MG Oral Tab EC Take 1 tablet (325 mg total) by mouth daily with breakfast.      atorvastatin 10 MG Oral Tab       diclofenac 1 % External Gel       mupirocin 2 % External Ointment       gabapentin 100 MG Oral Cap Take 1 capsule (100 mg total) by mouth 3 (three) times daily. 270 capsule 1    LOSARTAN 50 MG Oral Tab  TAKE 1 TABLET(50 MG) BY MOUTH DAILY 90 tablet 1    GLIMEPIRIDE 1 MG Oral Tab TAKE 1 TABLET(1 MG) BY MOUTH DAILY WITH BREAKFAST 90 tablet 1    hydrALAZINE 50 MG Oral Tab Take 1 tablet (50 mg total) by mouth 3 (three) times daily. 270 tablet 2    SERTRALINE 100 MG Oral Tab TAKE 1 TABLET(100 MG) BY MOUTH DAILY 90 tablet 2    torsemide 20 MG Oral Tab Take 1 tablet (20 mg total) by mouth as needed.     [5]   Allergies  Allergen Reactions    Bee Venom SWELLING

## 2025-04-22 NOTE — CM/SW NOTE
[facility] list given to pt's dtr Rubi.  Rubi said her family is still discussing pt's dc plan.  They will review list and determine if they want pt to go to a SNF based on how she continues to do each day.

## 2025-04-22 NOTE — PROGRESS NOTES
[facility] Hospitalist Progress Note     PCP: Cory Sanchez DO    Chief Complaint: follow-up   Follow up for: The primary encounter diagnosis was Hypernatremia. Diagnoses of Acute on chronic renal insufficiency, Type 2 MI (myocardial infarction) (HCC), Urinary tract infection with hematuria, site unspecified, and Dehydration, severe were also pertinent to this visit.    Overnight/Interim Events:    SUBJECTIVE:  Patient seen and examined.  SVT overnight. Started on cardizem gtt.   Had conversation with son today - they are thinking about [facility].  She's not eating much.  NAD.       OBJECTIVE:  Temp:  [98.4 °F (36.9 °C)-99.5 °F (37.5 °C)] 98.6 °F (37 °C)  Pulse:  [] 73  Resp:  [16-18] 16  BP: ()/(42-69) 126/44  SpO2:  [91 %-95 %] 95 %    Intake/Output:    Intake/Output Summary (Last 24 hours) at 4/22/2025 1051  Last data filed at 4/22/2025 0721  Gross per 24 hour   Intake 12.2 ml   Output 650 ml   Net -637.8 ml           Last 3 Weights   04/22/25 0739 120 lb 12.8 oz (54.8 kg)   04/13/25 2256 126 lb (57.2 kg)   04/13/25 1841 126 lb (57.2 kg)   03/29/23 1434 135 lb (61.2 kg)   07/05/22 1058 135 lb 2 oz (61.3 kg)       Exam    General: no distress, appears stated age.  Awake, nonverbal    Head:  Normocephalic, without obvious abnormality, atraumatic.   Eyes:  Sclera anicteric, EOMs intact.    Nose: Nares normal,  Mucosa normal    Throat: Lips normal   Neck: Supple, symmetrical, trachea midline   Lungs:   Clear to auscultation bilaterally. Normal effort   Chest wall:  No tenderness or deformity   Heart:  Regular rate and rhythm, S1, S2 normal, no murmur, rub or gallop appreciated   Abdomen:   Soft, NT/ND, Bowel sounds normal. No masses,  No organomegaly.    Extremities: Extremities normal, atraumatic, no cyanosis or LE edema.   Skin: Skin color, texture, turgor normal. No rashes or lesions.    Neurologic: Moving all extremities spontaneously, no focal deficit appreciated.   Unable to follow commands for full neuro  exam           Data Review:       Labs:     Recent Labs   Lab 04/16/25  0658 04/18/25  0628 04/19/25  0656 04/20/25  0611 04/21/25  0528   WBC 6.6 7.9 7.9 7.0 6.3   HGB 8.4* 7.4* 7.6* 7.9* 7.6*   MCV 95.3 90.1 87.9 90.3 89.2   .0* 96.0* 108.0* 134.0* 153.0       Recent Labs   Lab 04/16/25  0658 04/17/25  0551 04/18/25  0628 04/19/25  0656 04/20/25  0611 04/21/25  0528 04/22/25  0606      < > 137 139 137 139 142   K 4.4   < > 3.9 4.0 3.9 3.9 3.8      < > 104 104 104 103 102   CO2 15.0*   < > 22.0 23.0 22.0 23.0 23.0   BUN 84*   < > 84* 83* 84* 85* 90*   CREATSERUM 4.68*   < > 4.77* 4.77* 4.43* 4.30* 4.54*   CA 7.7*   < > 8.2* 8.4* 9.1 9.2 9.6   MG 2.5  --  2.4  --   --   --   --    GLU 80   < > 129* 123* 143* 160* 192*    < > = values in this interval not displayed.       Recent Labs   Lab 04/19/25  0656   ALT 11   AST 22   ALB 3.2       Recent Labs   Lab 04/21/25  0552 04/21/25  1257 04/21/25  1818 04/21/25  2047 04/22/25  0549   PGLU 175* 281* 216* 219* 204*       No results for input(s): \"TROP\" in the last 168 hours.      Meds:     Scheduled Medications[1]  Medication Infusions[2]  PRN Medications[3]       Assessment/Plan:     89 year old female with PMH including but not limited to HTN, CKD, DM, HL who p/t [facility] ED c weakness and hyperNa.      Toxic Metabolic Encephalopathy POA 2/2 Uremia, hypernatremia and UTI   Weakness  FTT  Prior Falls   Lethargy - improving   - likely 2/2 hyperNa and dementia and UTI and age   - PT/OT/SLP  - follow PO intake  - CTH chronic small vessel ischemic change   - CT spine multilevel degenerative disc disease in the cervical spine   - XR hip/shoulder no fx  - CXR noted, follow clinical sxs   - infection w/u so far negative  - CXR with LLL atelectasis/pleural effusion   - IV tylenol for pain   - neurology consulted for lethargy, EEG unremarkable - encephalopathy resolved, likely toxic/metabolic, given age and underlying dementia this is likely as close as she'll get  back to her baseline      SVT 4/22 early   - cardizem gtt  - cardiology consulted  - transition to po tomorrow  - monitor on tele      Acute E.coli UTI POA, now treated  - completed CTX/ancef 1w course on 4/19   - Ucx >100K EColi  - pan sens     Acute kidney injury on CKD4:   - B/l creatinine of 2.4-2.9 mg/dL in setting of long-standing HTN, diabetic nephropathy and advanced age. follows c Dr. Manzano   - JOSE ANTONIO likely 2/2 pre-renal azotemia  - apprec recs, IVF, improving  - dc IVF, cont diuresis per renal   - avoid nephrotoxins  - CT bladder wall thickening, no renal or ureteral stones.   - monitor renal fxn      Hypernatremia, now resolved   - initially was on hypotonic fluid per renal  - follow BMP     Hyperkalemia   - per renal, IVF      HTN c urgency   - dc PTA arb/toresemide  - prn hydralazine     # constipation  bowel regimen     # Type 2 diabetes mellitus, controlled A1c 6.0   -Hyperglycemic protocol with accu-checks QID and low-dose sliding scale insulin. Will keep 1800 ADA diet. Will hold PO meds  - stressed imporatnce of BG control and compliance c meds to prevent long-term cardiac, vascular, neuro, renal complications     # B/l UE swelling  - RUE doppler neg  - likely from IV infiltration  - compression/elevation     # Hypoglycemia x1 in AM  - protocol, likely dec PO intake   - lower SSI if persists     # GOC  - pallaitive c/s apprec, FC for now, can re-assess as needed pending clinical situation. D/w RANDI Samuel   - 04/18/25 - Advanced care planning - 16 minutes were spent discussing advanced care planning exclusive of the documented time for this visit. D/w daughter, patient nonverbal. For now will maintain full code, she will discuss with her siblings regarding GOC.     # Proph  - sqh    Dispo: med  -Lives c dtr   - DCP - monitor into tomorrow, family decidng on dc to home HHC vs. [facility], palliative following     Lina Cardenas MD  [facility] Hospitalist  Message over  [email or web address]  Pager: [phone]         [1]    insulin aspart  1-10 Units Subcutaneous TID AC and HS    heparin  5,000 Units Subcutaneous 2 times per day   [2]    dilTIAZem 5 mg/hr (04/22/25 5818)   [3]   menthol/methyl salicylate    hydrALAzine    acetaminophen    metoclopramide    glucose **OR** glucose **OR** glucose-vitamin C **OR** dextrose **OR** glucose **OR** glucose **OR** glucose-vitamin C    acetaminophen    polyethylene glycol (PEG 3350)    sennosides    bisacodyl    ondansetron

## 2025-04-22 NOTE — OCCUPATIONAL THERAPY NOTE
OCCUPATIONAL THERAPY TREATMENT NOTE - INPATIENT     Room Number: 430/430-A  Session: 1   Number of Visits to Meet Established Goals: 7    Presenting Problem: UTI, severe dehydration, Hypernatremia, AMS, fall, RUE pain    HOME SITUATION  Type of Home: House  Home Layout: Two level, Stairlift  Lives With: Daughter (pt's legally blind son also lives w/ her)     Toilet and Equipment: Standard height toilet, Grab bar  Shower/Tub and Equipment: Walk-in shower, Grab bar, Shower chair  Other Equipment:  (RW, transport chair)     Hand Dominance: Right  Drives: No  Patient Regularly Uses: Glasses     Prior Level of Function: per daughter, patient requires supervision to min A w/ use of walker in the home.  Patient has been in bed for the past week d/t daughter unable to get her up on her feet.  H/o of a fall one week prior to admit.    ASSESSMENT   Patient demonstrates limited progress this session, goals remain in progress.    Patient continues to function below baseline with  ADLs and functional transfers .   Contributing factors to remaining limitations include decreased functional strength, decreased functional reach, decreased endurance, pain, decreased muscular endurance, and cognitive deficits ( ).  Next session anticipate patient to progress bed mobility, transfers, static sitting balance, and dynamic sitting balance.  Occupational Therapy will continue to follow patient for duration of hospitalization.    Patient continues to benefit from continued skilled OT services.     History: Patient is a 89 year old female admitted on 4/13/2025 with Presenting Problem: UTI, severe dehydration, Hypernatremia, AMS, fall, RUE pain. Co-Morbidities : dementia, MI, DM, CKD, HTN     Imaging: R shoulder xray neg for acute fx, CTH neg, C-spine CT neg acute fx, severe stenosis C4-5  CTH: neg for acute process    WEIGHT BEARING RESTRICTION       TREATMENT SESSION:  Patient Start of Session: semi supine    FUNCTIONAL TRANSFER  ASSESSMENT  Sit to Stand: Edge of Bed; Chair  Edge of Bed: Not Tested  Chair: Not Tested    BED MOBILITY  Rolling: Not Tested  Supine to Sit : Not tested  Sit to Supine (OT): Not Tested  Scooting: NT    BALANCE ASSESSMENT  Static Sitting: Not Tested  Static Standing: Not Tested    FUNCTIONAL ADL ASSESSMENT  Grooming Seated: Not Tested  LB Dressing Seated: Not Tested  Toileting Seated: Not Tested    ACTIVITY TOLERANCE: limited-poor                         O2 SATURATIONS       EDUCATION PROVIDED  Patient Education : Posture/Positioning; Proper Body Mechanics  Patient's Response to Education: Does Not Demonstrate Skills Needed for Learning  Family/Caregiver Education : Role of Occupational Therapy; Plan of Care  Family/Caregiver's Response to Education: Verbalized Understanding    THERAPEUTIC EXERCISES  Lower Extremity AROM ankle pumps  PROM supine heel slides     Upper Extremity PROM shoulder raise  PROM elbow flex/extend     Position Semi supine     Repetitions 5-8   Sets 1     Therapist comments: Pt provided with gentle PROM exercises to pt's tolerance to maintain ROM and to decrease risk for contractures in order for pt to complete ADLs and functional transfers. Pt unable to verbalize pain, but winces with movement.     Patient End of Session: In bed, Needs met, Call light within reach, RN aware of session/findings, All patient questions and concerns addressed, Hospital anti-slip socks, Alarm set, Family present    SUBJECTIVE  Pleasant and cooperative.    PAIN ASSESSMENT  Rating: Unable to rate  Location: L LE with movement  Management Techniques: Activity promotion, Repositioning, Relaxation, Nurse notified     OBJECTIVE  Precautions: Bed/chair alarm    AM-PAC ‘6-Clicks’ Inpatient Daily Activity Short Form  -   Putting on and taking off regular lower body clothing?: Total  -   Bathing (including washing, rinsing, drying)?: Total  -   Toileting, which includes using toilet, bedpan or urinal? : Total  -   Putting on  and taking off regular upper body clothing?: Total  -   Taking care of personal grooming such as brushing teeth?: A Lot  -   Eating meals?: A Lot    AM-PAC Score:  Score: 8  Approx Degree of Impairment: 85.69%  Standardized Score (AM-PAC Scale): 22.86    PLAN  OT Device Recommendations: TBD  OT Treatment Plan: Balance activities, Energy conservation/work simplification techniques, ADL training, Functional transfer training, UE strengthening/ROM, Endurance training, Patient/Family education, Patient/Family training, Cognitive reorientation, Equipment eval/education, Compensatory technique education  Rehab Potential : Good  Frequency: 3-5x/week    OT Goals:     All goals ongoing 04/22    ADL Goals  Patient will perform toileting with supervision and AE PRN  Patient will perform LB dressing with supervision and AE PRN     Functional Transfer Goals  Patient will perform bed mobility supine to sit with supervision  Patient will perform bed mobility sit to supine with supervision  Patient will perform toilet transfer with supervision     Additional Goals:  Patient will state precautions and maintain during ADL    OT Session Time: 10 minutes  Therapeutic Exercise: 10 minutes

## 2025-04-22 NOTE — CONSULTS
[facility]  Report of Inpatient Wound Care Consultation    Ann Herndon Patient Status:  Inpatient    1925 MRN [id number]   [facility] 4NW-A Attending Eric Cardenas*   Hosp Day # 9 PCP Cory Sanchez DO     Reason for Consultation:  \"pressure sore\"    History of Present Illness:  Ann Herndon is a a(n) 89 year old female. Son present in the room. Patient is non verbal.  Patient with multiple comorbidities, with skin breakdown described below.       History:  Past Medical History[1]  Past Surgical History[2]   reports that she has never smoked. She has never used smokeless tobacco. She reports current alcohol use. She reports that she does not use drugs.      Allergies:  @ALLERGY    Laboratory Data:    Recent Labs   Lab 25  0628 25  1230 25  0656 25  1128 25  0611 25  1052 25  0528 25  0552 25  1818 25  2047 25  0549 25  0606   WBC 7.9  --  7.9  --  7.0  --  6.3  --   --   --   --   --    HGB 7.4*  --  7.6*  --  7.9*  --  7.6*  --   --   --   --   --    HCT 23.7*  --  23.2*  --  25.1*  --  24.0*  --   --   --   --   --    PLT 96.0*  --  108.0*  --  134.0*  --  153.0  --   --   --   --   --    CREATSERUM 4.77*  --  4.77*  --  4.43*  --  4.30*  --   --   --   --  4.54*   BUN 84*  --  83*  --  84*  --  85*  --   --   --   --  90*   *  --  123*  --  143*  --  160*  --   --   --   --  192*   CA 8.2*  --  8.4*  --  9.1  --  9.2  --   --   --   --  9.6   ALB  --   --  3.2  --   --   --   --   --   --   --   --   --    TP  --   --  5.8  --   --   --   --   --   --   --   --   --    ESRML 88*  --   --   --   --   --   --   --   --   --   --   --    PGLU  --    < >  --    < >  --    < >  --    < > 216* 219* 204*  --     < > = values in this interval not displayed.         ASSESSMENT:  Wound 25 Back Medial (Active)   Date First Assessed/Time First Assessed: 25 1440   Primary Wound Type:  Pressure Injury  Location: Back  Wound Location Orientation: Medial      Assessments 4/22/2025  9:56 AM   Wound Image     Site Assessment Pink   Drainage Amount None   Treatments Cleansed   Dressing Optifoam   Dressing Changed Changed   Wound Length (cm) 1.2 cm   Wound Width (cm) 1 cm   Wound Surface Area (cm^2) 0.94 cm^2   Wound Depth (cm) 0.1 cm   Wound Volume (cm^3) 0.063 cm^3   Margins Well-defined edges   Non-staged Wound Description Partial thickness   Page-wound Assessment Clean;Dry   Wound Odor None   Shape 100% pink non granular tissue, etilogy probably involves friction and shear       Wound 04/21/25 Back Medial;Right;Upper (Active)   Date First Assessed/Time First Assessed: 04/21/25 1441   Location: Back  Wound Location Orientation: Medial;Right;Upper      Assessments 4/22/2025  9:57 AM   Wound Image     Site Assessment Pink   Drainage Amount None   Treatments Cleansed   Dressing Optifoam   Dressing Changed Changed   Wound Length (cm) 0.9 cm   Wound Width (cm) 1.4 cm   Wound Surface Area (cm^2) 0.99 cm^2   Wound Depth (cm) 0.1 cm   Wound Volume (cm^3) 0.066 cm^3   Margins Well-defined edges   Non-staged Wound Description Partial thickness   Page-wound Assessment Dry;Clean   Wound Odor None   Shape 100% non granulatory pink tissue, etilogy probably involves friction and shear       Wound Cleaning and Dressings:  Mid upper and right upper back  Wound cleansing: normal saline  Wound cleaning frequency: every other day  Wound product: Silver foam  Dressing change frequency:  Change dressing every other day and/or as needed    ALL WOUND CARE SUPPLIES CAN BE OBTAINED FROM [facility]     Miscellaneous/Additional Orders:  Off loading: specialty bed. Prompt incontinent care    If patient is Diabetic: want to make sure blood sugars are within a controled range for wound healing     Protein intake: depending on providers recommendations and patients kidney functions - if kidneys are good then recommend  patient to increase protein intake (Boost, Zak, Ensure, Premiere Protein)     Recommendations: If patient is discharged home, may follow up with home health and/or outpatient wound care clinic.     Thank you for this consultation and for allowing me to participate in the care of your patient.  Please call [phone] if you have any questions about this consultation and plan of care.     Time Spent 30 Minutes.    Thank you,  Nery Ovalles RN  Wound/Ostomy/Continence nurse    4/22/2025  10:40 AM       [1]   Past Medical History:   Anemia    Arthritis    Back problem    herniated discs L4-L5    Blood disorder    anemia    Cellulitis, leg    Dementia (HCC)    Depression    Elevated blood pressure reading without diagnosis of hypertension    Esophageal reflux    Gastritis    GENITO-URINARY DISEASE    Glaucoma    HIGH BLOOD PRESSURE    HIGH CHOLESTEROL    Kidney disease    Lumbar degenerative disc disease    Lumbar radiculitis    Lumbar spondylosis    MADHU (obstructive sleep apnea)    AHI 5 RDI 5 REM AHI 0 Supine AHI 2 non-supine AHI 6     MADHU (obstructive sleep apnea)    Osteoarthrosis, unspecified whether generalized or localized, unspecified site    Other and unspecified hyperlipidemia    Pain in limb    Personal history of urinary (tract) infection    below th eknee    Renal disorder    elevated BUN and creatinine    Shortness of breath    uses oxygen 2.5 L/NC every night for hx of low saturation identified during sleep study 2014    Spinal stenosis    Type II or unspecified type diabetes mellitus without mention of complication, not stated as uncontrolled    Unspecified acute reaction to stress    Unspecified essential hypertension    Visual impairment    glasses   [2]   Past Surgical History:  Procedure Laterality Date    Angiogram  FEB 2013    Angiogram  Feb 2013    Done in [facility]    Appendectomy  1949    Appendectomy      Cataract surgery, complex  08/2010    Cath percutaneous  transluminal coronary angioplasty   2/2012    Normal in [facility]    Colonoscopy N/A 11/15/2016    Procedure: COLONOSCOPY;  Surgeon: Levar Callaway MD;  Location:  ENDOSCOPY    Colonoscopy & polypectomy  11/15/16= Polyp (serrated)    Repeat PRN    Fluor gid & loclzj ndl/cath spi dx/ther njx  8/30/2013    Procedure: LUMBAR EPIDURAL;  Surgeon: Srini Benjamin MD;  Location: [facility]    Fluor gid & loclzj ndl/cath spi dx/ther njx  9/16/2013    Procedure: LUMBAR EPIDURAL;  Surgeon: Srini Benjamin MD;  Location: [facility]    Fluor gid & loclzj ndl/cath spi dx/ther njx  10/8/2013    Procedure: LUMBAR EPIDURAL;  Surgeon: Srini Benjamin MD;  Location: [facility]    Fluor gid & loclzj ndl/cath spi dx/ther njx  6/24/2014    Procedure: ;  Surgeon: Srini Benjamin MD;  Location: [facility]    Fluor gid & loclzj ndl/cath spi dx/ther njx N/A 7/24/2014    Procedure: LUMBAR EPIDURAL;  Surgeon: Srini Benjamin MD;  Location: [facility]    Hysterectomy      Hysterectomy  1974    Injection, w/wo contrast, dx/therapeutic substance, epidural/subarachnoid; lumbar/sacral  8/30/2013    Procedure: LUMBAR EPIDURAL;  Surgeon: Srini Benjamin MD;  Location: [facility]    Injection, w/wo contrast, dx/therapeutic substance, epidural/subarachnoid; lumbar/sacral  9/16/2013    Procedure: LUMBAR EPIDURAL;  Surgeon: Srini Benjamin MD;  Location: [facility]    Injection, w/wo contrast, dx/therapeutic substance, epidural/subarachnoid; lumbar/sacral  10/8/2013    Procedure: LUMBAR EPIDURAL;  Surgeon: Srini Benjamin MD;  Location: [facility]    Injection, w/wo contrast, dx/therapeutic substance, epidural/subarachnoid; lumbar/sacral N/A 6/24/2014    Procedure: LUMBAR EPIDURAL;  Surgeon: Srini Benjamin MD;  Location: [facility]    Injection, w/wo contrast, dx/therapeutic substance, epidural/subarachnoid;  lumbar/sacral N/A 7/24/2014    Procedure: LUMBAR EPIDURAL;  Surgeon: Srini Benjamin MD;  Location: [facility]    M-sedaj by  phys perfrmg svc 5+ yr  8/30/2013    Procedure: LUMBAR EPIDURAL;  Surgeon: Srini Benjamin MD;  Location: [facility]    M-sedaj by  phys perfrmg svc 5+ yr  9/16/2013    Procedure: LUMBAR EPIDURAL;  Surgeon: Srini Benjamin MD;  Location: [facility]    M-sedaj by  phys perfrmg svc 5+ yr  10/8/2013    Procedure: LUMBAR EPIDURAL;  Surgeon: Srini Benjamin MD;  Location: [facility]    M-sedaj by  phys perfrmg svc 5+ yr  6/24/2014    Procedure: ;  Surgeon: Srini Benjamin MD;  Location: [facility]    M-sedaj by  phys perfrmg svc 5+ yr N/A 7/24/2014    Procedure: LUMBAR EPIDURAL;  Surgeon: Srini Benjamin MD;  [facility]    Patient documented not to have experienced any of the following events  8/30/2013    Procedure: LUMBAR EPIDURAL;  Surgeon: Srini Benjamin MD;  Location: [facility]    Patient documented not to have experienced any of the following events  9/16/2013    Procedure: LUMBAR EPIDURAL;  Surgeon: Srini Benjamin MD;  Location: [facility]    Patient documented not to have experienced any of the following events  10/8/2013    Procedure: LUMBAR EPIDURAL;  Surgeon: Srini Benjamin MD;  Location: [facility]    Patient documented not to have experienced any of the following events  6/24/2014    Procedure: ;  Surgeon: Srini Benjamin MD;  Location: [facility]    Patient documented not to have experienced any of the following events N/A 7/24/2014    Procedure: LUMBAR EPIDURAL;  Surgeon: Srini Benjamin MD;  [facility]    Patient withough preoperative order for iv antibiotic surgical site infection prophylaxis.  8/30/2013    Procedure: LUMBAR  EPIDURAL;  Surgeon: Srini Benjamin MD;  [place] [facility]    Patient withough preoperative order for iv antibiotic surgical site infection prophylaxis.  9/16/2013    Procedure: LUMBAR EPIDURAL;  Surgeon: Srini Benjamin MD;  [place]: [facility]    Patient withough preoperative order for iv antibiotic surgical site infection prophylaxis.  10/8/2013    Procedure: LUMBAR EPIDURAL;  Surgeon: Srini Benjamin MD;  Location: [facility]    Patient withough preoperative order for iv antibiotic surgical site infection prophylaxis.  6/24/2014    Procedure: ;  Surgeon: Srini Benjamin MD;  Location: [facility]    Patient withough preoperative order for iv antibiotic surgical site infection prophylaxis. N/A 7/24/2014    Procedure: LUMBAR EPIDURAL;  Surgeon: Srini Benjamin MD;  Location: [facility]    Upper gi endoscopy performed  10/7/13

## 2025-04-22 NOTE — PROGRESS NOTES
[facility]   part of [facility]  Palliative Care Follow Up    Ann Herndon Patient Status:  Inpatient    1925 MRN [id number]   Location [facility] 4NW-A Attending Eric Cardenas*   Hosp Day # 9 PCP Cory Sanchez,    426/426-A    Date of visit:  2025  Day 9 of hospitalization.     Palliative care consult requested by Dr. Nielsen for support with goals of care discussions.     Summary:         HPI:  Ann Herndon is a 89 year old female with PMH significant for CKD-4, dementia, HTN, DM, HL presented to ED on 2025 with CC of weakness w recent falls at home, poor PO.  Initial workup revealed UTI, JOSE ANTONIO (Ct 5.32), hypernatremia (158), hyperkalemia (5.4), elevated troponin, constipation, L gluteal wound. Head, spine, hip/shoulder imaging neg for acute processes. Hospital course includes renal on consult, wound on consult, bowel regimen, RUE swelling - doppler neg. Received iv hydralazine this AM.     Therapy recommendations are [facility], however, family preference for her to return home.   SLP recommends soft/easy to chew solids and thin liquids.      Interval Events: SVT overnight, diltiazem started.  Cardiology consulted, input reviewed.  - Developing wounds    SUBJECTIVE  Review of Systems - Palliative Care Symptom Assessment     I visited Ann with Rex at bedside.   He reports she ate minimal amount for breakfast (1 applesauce). Has been mostly sleeping w brief periods of wakefulness.    OBJECTIVE     Hematology:   Lab Results   Component Value Date    WBC 6.3 2025    HGB 7.6 (L) 2025    HCT 24.0 (L) 2025    .0 2025       Chemistry:  Lab Results   Component Value Date    CREATSERUM 4.54 (H) 2025    BUN 90 (H) 2025     2025    K 3.8 2025     2025    CO2 23.0 2025     (H) 2025    CA 9.6 2025    ALB 3.2 2025    ALKPHO 72 2025    BILT 0.2 2025    TP 5.8  04/19/2025    AST 22 04/19/2025    ALT 11 04/19/2025    DDIMER 0.82 (H) 11/22/2021    BNP 17 01/12/2013    MG 2.4 04/18/2025    TROP <0.045 03/19/2021       Imaging:  US VENOUS DOPPLER ARM LEFT - DIAG IMG (CPT=93971)  Result Date: 4/20/2025  CONCLUSION:  No evidence of DVT in the left upper extremity.   LOCATION:  [facility]   Dictated by (CST): Cristo Hawk MD on 4/20/2025 at 11:12 AM     Finalized by (CST): Cristo Hawk MD on 4/20/2025 at 11:12 AM         Vital Signs:  Blood pressure 126/44, pulse 73, temperature 98.6 °F (37 °C), temperature source Oral, resp. rate 16, height 4' 9\" (1.448 m), weight 120 lb 12.8 oz (54.8 kg), SpO2 95%, not currently breastfeeding.  Body mass index is 26.14 kg/m².      Physical Exam:     GENERAL: Fatigued, seems more so today. NAD.  RESPIRATORY: no increased effort  EXTREMITIES: edematous - improving still  NEUROLOGIC: sleepy and less interactive  SKIN: Warm and dry.     Palliative Performance Scale: 35%  Palliative Performance Scale   % Ambulation Activity Level Self-Care Intake Consciousness   100 Full Normal Full   Normal Full   90 Full No disease  Normal Full Normal Full   80 Full Some disease  Normal w/effort Full Normal or  Reduced Full   70 Reduced Some disease  Can't perform job Full Normal or   Reduced Full   60 Reduced Significant disease  Can't perform hobby Occasional  Assist Normal or   Reduced Full or confused   50 Mainly sit/lie Extensive Disease  Can't do any work Partial Assist Normal or Reduced Full or confused   40 Mainly in bed Extensive Disease  Can't do any work Mainly Assist Normal or Reduced Full or confused   30 Bedbound Extensive Disease  Can't do any work Max Assist  Total Care Reduced Drowsy/confused   20 Bedbound Extensive Disease  Can't do any work Max Assist  Total Care Minimal Drowsy/confused   10 Bedbound/coma Extensive Disease  Can't do any work  coma  Max Assist  Total Care Mouth care Drowsy or coma   0 Death       Palliative Care  Assessment:     Goals of Care Discussion:     I spoke with Rex at Ann's bedside. He expressed concern that she has not eaten much for breakfast. She is sleeping mostly. He spoke w cardiologist regarding AF, IV dilt and plan to transition to PO. Agrees w conservative/medical management given her age.     He stated that family has not had a chance to discuss GOC collectively yet, but that he and Rubi are considering [facility] at [place]. He is aware of possibility she may not be accepted. Reviewed c/f reduced PO intake, discussed considerations for rehab potential, and advised on therapy input as documented (inconsistently following commands, fatigue, 2 max assist to edge of bed).     If she is not accepted to [facility], then family will consider home + CG vs SNF. Aware of option to involve hospice if aligns w GOC.    Will d/w treatment team and return to confirm w Rubi when she is here.    Problem List:       Principal Problem:    Hypernatremia  Active Problems:    Hyperkalemia    Essential hypertension    Urinary tract infection with hematuria, site unspecified    CKD (chronic kidney disease) stage 4, GFR 15-29 ml/min ([facility])    Acute on chronic renal insufficiency    Type 2 MI (myocardial infarction) ([facility])    Dehydration, severe    Acidosis    Palliative care encounter    Counseling regarding advance care planning and goals of care    Encephalopathy acute        Palliative Care Recommendations/Plan:         Goals of Care:  Continue medical management.  Family requests to explore [facility] at [place] in hopes that she can regain enough strength to eventually return home.   If not able to go to [facility], then family prepared to decide between home + CG support vs SNF. Aware of option to involve hospice if home w comfort/no rehospitalization is the GOC.   If treatment-focus continues to be GOC, recommend community PC at [place]. Hospitalist placed referral for community PC on 4/21.    Code Status: Full Code.   Previously addressed. Daughter  considering based on clinical course.    HCPOA: Daughter, Rubi    Symptoms - Pain in extremities - improved today: tylenol prn, Theragesic prn    Disposition:  Pending GOC.      A total of 25 minutes were spent on this visit, which included all of the following:  Chart review, direct face to face contact, history taking, physical examination, counseling and coordinating care, and documentation.        Reviewed the above with patient's RN, JULIO CESAR, hospitalist.    Thank you for inviting Palliative Care to participate in the care of Ann Herndon and family.       Will follow.      RANDI Ann  Palliative Care    4/22/2025  10:52 AM    The 21st Century Cures Act makes medical notes like these available to patients in the interest of transparency. Please be advised this is a medical document. Medical documents are intended to carry relevant information, facts as evident, and the clinical opinion of the practitioner. The medical note is intended as a peer to peer communication, and may appear blunt or direct. It is written in medical language and may contain abbreviations or verbiage that are unfamiliar.    Addendum: Spoke with Rubi at bedside. She expressed concern that Ann was minimally able to participate w therapy yesterday, more lethargic w shorter periods of wakefulness, has spent prolonged periods of the past 2 days praying, did not recognize/react to the Aguilar's death yesterday as she would if she was at baseline. Concern for poor po intake.   We discussed concerns for limited rehab potential. Reviewed options for [facility] vs home/snf w hospice. She emphasizes her understanding that Ann's time may be short, and this may be her new baseline. She will want to promote her comfort and dignity and not press her to do things that she is unable to do if she is truly at EOL. Given her consistent statements over our discussions, I advised of option to meet w hospice for eval/information before DC as this sounds to be  what they choose for her at some point. She is not ready to formally explore hospice today, but states she will d/w her siblings. She requests to have the day tomorrow to consider privately/with her family, and will call us if needed. Otherwise, open to PC follow up later this week if she remains hospitalized. Reviewed that PC is arranged to follow at [place] if GOC remain treatment focused.     Emotional support provided.  D/w RN and SW.    An additional 40 minutes spent on encounter for a total of 65 minutes.    RANDI Ann, 12:09 PM

## 2025-04-23 VITALS
HEIGHT: 57 IN | WEIGHT: 120.81 LBS | RESPIRATION RATE: 18 BRPM | BODY MASS INDEX: 26.06 KG/M2 | HEART RATE: 79 BPM | DIASTOLIC BLOOD PRESSURE: 44 MMHG | OXYGEN SATURATION: 96 % | SYSTOLIC BLOOD PRESSURE: 143 MMHG | TEMPERATURE: 99 F

## 2025-04-23 LAB
ANION GAP SERPL CALC-SCNC: 16 MMOL/L (ref 0–18)
BUN BLD-MCNC: 89 MG/DL (ref 9–23)
CALCIUM BLD-MCNC: 9.4 MG/DL (ref 8.7–10.6)
CHLORIDE SERPL-SCNC: 104 MMOL/L (ref 98–112)
CO2 SERPL-SCNC: 23 MMOL/L (ref 21–32)
CREAT BLD-MCNC: 4.5 MG/DL (ref 0.55–1.02)
EGFRCR SERPLBLD CKD-EPI 2021: 8 ML/MIN/1.73M2 (ref 60–?)
GLUCOSE BLD-MCNC: 192 MG/DL (ref 70–99)
GLUCOSE BLD-MCNC: 197 MG/DL (ref 70–99)
GLUCOSE BLD-MCNC: 336 MG/DL (ref 70–99)
MAGNESIUM SERPL-MCNC: 2.4 MG/DL (ref 1.6–2.6)
OSMOLALITY SERPL CALC.SUM OF ELEC: 329 MOSM/KG (ref 275–295)
POTASSIUM SERPL-SCNC: 4.2 MMOL/L (ref 3.5–5.1)
SODIUM SERPL-SCNC: 143 MMOL/L (ref 136–145)

## 2025-04-23 PROCEDURE — 99232 SBSQ HOSP IP/OBS MODERATE 35: CPT | Performed by: INTERNAL MEDICINE

## 2025-04-23 RX ORDER — HYDRALAZINE HYDROCHLORIDE 25 MG/1
25 TABLET, FILM COATED ORAL EVERY 6 HOURS PRN
Status: SHIPPED | COMMUNITY
Start: 2025-04-23

## 2025-04-23 RX ORDER — METOPROLOL SUCCINATE 25 MG/1
25 TABLET, EXTENDED RELEASE ORAL
Status: SHIPPED | COMMUNITY
Start: 2025-04-24

## 2025-04-23 RX ORDER — METOPROLOL SUCCINATE 25 MG/1
25 TABLET, EXTENDED RELEASE ORAL
Status: DISCONTINUED | OUTPATIENT
Start: 2025-04-24 | End: 2025-04-23

## 2025-04-23 RX ORDER — HYDRALAZINE HYDROCHLORIDE 25 MG/1
25 TABLET, FILM COATED ORAL EVERY 6 HOURS PRN
Status: DISCONTINUED | OUTPATIENT
Start: 2025-04-23 | End: 2025-04-23

## 2025-04-23 NOTE — PROGRESS NOTES
PHYSICIAN CLARIFICATION    Additional information related to patient's Type II MI.    Type II MI ruled out     This note is part of the patient's medical record.

## 2025-04-23 NOTE — PHYSICAL THERAPY NOTE
PHYSICAL THERAPY TREATMENT NOTE - INPATIENT    Room Number: 430/430-A     Session: 3     Number of Visits to Meet Established Goals: 5    Presenting Problem: AMS, incr weakness, lethargic  Co-Morbidities : dementia, MI, DM, CKD, HTN    History related to current admission: Patient is a 89 year old female admitted on 4/13/2025 from home for incr lethargy,  altered mental status.  Pt diagnosed with failure to thrive, UTI.     HOME SITUATION  Type of Home: House  Home Layout: Two level, Stairlift  Stairs to Enter : 3   Railing: No         Lives With: Daughter (pt's legally blind son also lives w/ her)    Drives: No   Patient Regularly Uses: Glasses       Prior Level of [place]:   Per pt's daughter >> Pt resides in two story home with daughter and son who is legally blind. Bed/bath on second floor via stairlift. She ambulates with RW for household distances, use of wheelchair for longer distances. Family assists with 3 steps to enter via HHA. Had an unwitnessed fall (maybe a week ago), with subsequent R shoulder pain (imaging negative). Daughter reports significant decline in last week- she has been having to walk her to the bathroom. All she has been doing is ambulating to/from bed to bathroom. Daughter has been having to assist with bed mobility.   Typically her routine is to get up in the morning, get ready, go downstairs and spend the day downstairs until about 9PM.    PHYSICAL THERAPY ASSESSMENT   Patient demonstrates limited progress this session, goals  remain in progress.      Patient is requiring maximum assist and dependent as a result of the following impairments: decreased functional strength, decreased endurance/aerobic capacity, pain, impaired   balance, impaired coordination, impaired motor planning, decreased muscular endurance, cognitive deficits (dementia), medical status, and limited   ROM.     Patient continues to function below baseline with bed mobility, maintaining seated position, and  standing prolonged periods.  Next session anticipate patient to progress bed mobility, transfers, gait, stair negotiation, maintaining seated position, and standing prolonged periods.  Physical Therapy will continue to follow patient for duration of hospitalization.    Patient continues to benefit from continued skilled PT services: to promote return to prior level of function and safety with continuous assistance and gradual rehabilitative therapy .    PLAN DURING HOSPITALIZATION  Nursing Mobility Recommendation : Lift Equipment  PT Device Recommendation: Rolling walker  PT Treatment Plan: Bed mobility, Body mechanics, Endurance, Energy conservation, Family education, Patient education, Gait training, Neuromuscular re-educate, Range of motion, Strengthening, Stoop training, Stair training, Transfer training, Balance training  Frequency (Obs): 3-5x/week     CURRENT GOALS   Goal #1 Patient is able to demonstrate supine - sit EOB @ level: supervision      Goal #2 Patient is able to demonstrate transfers Sit to/from Stand at assistance level: supervision      Goal #3 Patient is able to ambulate 50 feet with assist device: walker - rolling at assistance level: supervision      Goal #4 Patient is able to ambulate 100 feet with RW at supervision.   Goal #5 Patient is able to ascend/descend 3 steps with HHA at CGA.   Discontinue at this time.    Goal #6     Goal Comments: Goals established on 4/14/2025 4/23/2025 all goals ongoing and modified based on pt's performance.     SUBJECTIVE  Pt's daughter present. Pt non verbal.     OBJECTIVE  Precautions: Bed/chair alarm    WEIGHT BEARING RESTRICTION     PAIN ASSESSMENT   Rating: Unable to rate  Location: sensitive to touch all over but more pain in L knee  Management Techniques: Repositioning, Activity promotion    BALANCE                                                                                                                       Static Sitting: Poor  Dynamic  Sitting: Poor -           Static Standing: Dependent  Dynamic Standing: Not tested    ACTIVITY TOLERANCE   Pt complained of pain by grimacing throughout the session with movements at all joints. Pt was inconsistent with following commands. Pt was reciting a prayer in Japanese throughout the entire session per patient's daughter.                       O2 WALK       AM-PAC '6-Clicks' INPATIENT SHORT FORM - BASIC MOBILITY  How much difficulty does the patient currently have...  Patient Difficulty: Turning over in bed (including adjusting bedclothes, sheets and blankets)?: A Lot   Patient Difficulty: Sitting down on and standing up from a chair with arms (e.g., wheelchair, bedside commode, etc.): A Lot   Patient Difficulty: Moving from lying on back to sitting on the side of the bed?: A Lot   How much help from another person does the patient currently need...   Help from Another: Moving to and from a bed to a chair (including a wheelchair)?: Total   Help from Another: Need to walk in hospital room?: Total   Help from Another: Climbing 3-5 steps with a railing?: Total     AM-PAC Score:  Raw Score: 9   Approx Degree of Impairment: 81.38%   Standardized Score (AM-PAC Scale): 30.55   CMS Modifier (G-Code): CM    FUNCTIONAL ABILITY STATUS  Gait Assessment   Functional Mobility/Gait Assessment  Gait Assistance: Not tested    Skilled Therapy Provided: AAROM in supine.   Bed Mobility:  Rolling: max A   Supine<>Sit: max assist of one.   Maintained sitting at EOB with cga initially later with SBA.       Transfer Mobility:  Sit<>Stand: max assist of two. Feet blocked and knee flexed with assist for proper positioning.    Stand<>Sit: dependent  Gait: NT    Therapist's Comments: Pt was educated on role of therapy and goals of session. Daughter present. Exercises performed in supine for knee flexion, hip SLR, abduction, SAQ and ankle pumps with assist.   Patient maintained eyes closed throughout session, constantly repeating  Restorationism prayer, didn't follow commands and didn't respond to any questions. Responds to manually guided movements.     Patient End of Session: In bed, Needs met, Call light within reach, Hospital anti-slip socks, Alarm set, Family present    PT Session Time: 23 minutes  Therapeutic Activity: 23 minutes

## 2025-04-23 NOTE — CM/SW NOTE
04/23/25 1415   Discharge disposition   Expected discharge disposition subacute   Post Acute Care Provider [facility]'s   Discharge transportation [facility]     [facility] to transport to [facility] at 4PM. PCS complete. Son and dtr aware and agreeable to discharge.    RN to call report [phone].     VIVIANA Foster RN, CM  X [phone]

## 2025-04-23 NOTE — PROGRESS NOTES
PHYSICIAN CLARIFICATION    Additional information related to patient's encephalopathy.    Toxic metabolic encephalopathy     This note is part of the patient's medical record.

## 2025-04-23 NOTE — DISCHARGE SUMMARY
[facility] Hospitalist Discharge Summary     Patient ID:  Ann Herndon  89 year old  2/28/1925    Admit date: 4/13/2025    Discharge date and time:  4/23/2025  6:23 PM     Attending Physician: No att. providers found     Primary Care Physician: Cory Sanchez DO     Discharge Diagnoses: Hypernatremia [E87.0]  Dehydration, severe [E86.0]  Acute on chronic renal insufficiency [N28.9, N18.9]  Urinary tract infection with hematuria, site unspecified [N39.0, R31.9]  Type 2 MI (myocardial infarction) (HCC) [I21.A1]    Please note that only [facility] DMG and [facility] patients enrolled in the Medicare ACO, [facility] ACO and [facility] HMOs will be handled by the [facility] Care Management team.  For all other patients, please follow usual protocol for discharge care transition.    Discharge Condition: stable    Disposition:  [facility]    Important Follow up:  - PCP within 2 weeks              Hospital Course:        89 year old female with PMH including but not limited to HTN, CKD, DM, HL who p/t [facility] ED c weakness and hyperNa.      Lives c dtr who provides most of hx, son here too. Saw Dr. Sanchez week before, fallen the week before. Pain R shoulder. Weak, worsening. Denies lot of water at home. Body clock off. No strength. No f/c. No dysuria, no cough. Last BM 6d ago    Toxic Metabolic Encephalopathy POA 2/2 Uremia, hypernatremia and UTI   Weakness  FTT  Prior Falls   Lethargy - improving   - likely 2/2 hyperNa and dementia and UTI and age   - PT/OT/SLP  - follow PO intake  - CTH chronic small vessel ischemic change   - CT spine multilevel degenerative disc disease in the cervical spine   - XR hip/shoulder no fx  - CXR noted, follow clinical sxs   - infection w/u so far negative  - CXR with LLL atelectasis/pleural effusion   - IV tylenol for pain   - neurology consulted for lethargy, EEG unremarkable - encephalopathy resolved, likely toxic/metabolic, given age and underlying dementia this  is likely as close as she'll get back to her baseline       SVT 4/22 early   - cardizem gtt  - cardiology consulted  - transition to po today - start metoprolol xl qd  - monitor on tele      Acute E.coli UTI POA, now treated  - completed CTX/ancef 1w course on 4/19   - Ucx >100K EColi  - pan sens     Acute kidney injury on CKD4:   - B/l creatinine of 2.4-2.9 mg/dL in setting of long-standing HTN, diabetic nephropathy and advanced age. follows c Dr. Manzano   - JOSE ANTONIO likely 2/2 pre-renal azotemia  - apprec recs, IVF, improving  - dc IVF, cont diuresis per renal   - avoid nephrotoxins  - CT bladder wall thickening, no renal or ureteral stones.   - monitor renal fxn      Hypernatremia, now resolved   - initially was on hypotonic fluid per renal  - follow BMP     Hyperkalemia   - per renal, IVF      HTN c urgency   - dc PTA arb/toresemide  - prn hydralazine     # constipation  bowel regimen     # Type 2 diabetes mellitus, controlled A1c 6.0   -Hyperglycemic protocol with accu-checks QID and low-dose sliding scale insulin. Will keep 1800 ADA diet. Will hold PO meds  - stressed imporatnce of BG control and compliance c meds to prevent long-term cardiac, vascular, neuro, renal complications   - resume glimiperide on dc      # B/l UE swelling  - RUE doppler neg  - likely from IV infiltration  - compression/elevation      # Hypoglycemia x1 in AM  - protocol, likely dec PO intake   - lower SSI if persists      # GOC  - pallaitive c/s apprec, FC for now, can re-assess as needed pending clinical situation. D/w RANDI Samuel   - 04/18/25 - Advanced care planning - 16 minutes were spent discussing advanced care planning exclusive of the documented time for this visit. D/w daughter, patient nonverbal. For now will maintain full code, she will discuss with her siblings regarding GOC.      # Proph  - sqh      Consults: IP CONSULT TO NEPHROLOGY  IP CONSULT TO NEPHROLOGY  NURSING CONSULT TO DIETITIAN  IP CONSULT TO SOCIAL WORK  CONSULT TO  WOUND OSTOMY  IP CONSULT PALLIATIVE CARE  IP CONSULT TO NEUROLOGY  IP CONSULT TO SOCIAL WORK  CONSULT TO WOUND OSTOMY  IP CONSULT TO CARDIOLOGY    Operative Procedures:        Patient instructions:      I as the attending physician reconciled the current and discharge medications on day of discharge.     Discharge Medication List as of 4/23/2025  3:49 PM        START taking these medications    Details   metoprolol succinate ER 25 MG Oral Tablet 24 Hr Take 1 tablet (25 mg total) by mouth Daily Beta Blocker., Historical           CONTINUE these medications which have CHANGED    Details   hydrALAZINE 25 MG Oral Tab Take 1 tablet (25 mg total) by mouth every 6 (six) hours as needed (For SBP >160)., Historical           STOP taking these medications       atorvastatin 10 MG Oral Tab        diclofenac 1 % External Gel        gabapentin 100 MG Oral Cap        glimepiride 1 MG Oral Tab        losartan 50 MG Oral Tab        mupirocin 2 % External Ointment        sertraline 100 MG Oral Tab        torsemide 20 MG Oral Tab        gabapentin 100 MG Oral Cap        LOSARTAN 50 MG Oral Tab        GLIMEPIRIDE 1 MG Oral Tab        SERTRALINE 100 MG Oral Tab        torsemide 20 MG Oral Tab        Multiple Vitamins-Minerals (TAB-A-OMI) Oral Tab        Cholecalciferol (VITAMIN D3) 25 MCG (1000 UT) Oral Cap        Multiple Vitamins-Minerals (PRESERVISION AREDS) Oral Tab        ferrous sulfate 325 (65 FE) MG Oral Tab EC              Activity: activity as tolerated  Diet: regular diet  Wound Care: as directed  Code Status: Full Code      Discharge Exam:     General: no acute distress, alert and oriented, pleasantly confused, minimal conversation   Heart: RRR  Lungs: clear bilaterally, no active wheezing  Abdomen: nontender, nondistended, intact BS  Extremities: no pedal edema   Neuro: CN inact, no focal deficits      Total time coordinating care for discharge: Greater than 30 minutes    Lina Cardenas MD  [facility]  Hospitalist

## 2025-04-23 NOTE — PROGRESS NOTES
Cardiology Progress Note      [facility]   part of [facility]        Ann Herndon Patient Status:  Inpatient    1925 MRN [id number]   Location [facility] 4NW-A Attending Eric Cardenas*   Hosp Day # 10 PCP Cory Sanchez DO       Subjective: Sleeping with daughter at bedside. Able to awake with name, nut no response to questions. She is unable to convey if she is having any chest pain/ pressure or other focal cardiac complaints, but she appears comfortable in bed. Daughter is hoping she will discharge to rehab today.       Objective:     Temp:  [98.4 °F (36.9 °C)-100.4 °F (38 °C)] 98.9 °F (37.2 °C)  Pulse:  [] 132  Resp:  [16-18] 18  BP: (114-147)/(36-54) 114/54  SpO2:  [90 %-99 %] 90 %  Temp (24hrs), Av.1 °F (37.3 °C), Min:98.4 °F (36.9 °C), Max:100.4 °F (38 °C)      Intake/Output:    Intake/Output Summary (Last 24 hours) at 2025 1205  Last data filed at 2025 0800  Gross per 24 hour   Intake 166.5 ml   Output 750 ml   Net -583.5 ml     Last 3 Weights   25 0739 120 lb 12.8 oz (54.8 kg)   25 2256 126 lb (57.2 kg)   25 1841 126 lb (57.2 kg)   23 1434 135 lb (61.2 kg)   22 1058 135 lb 2 oz (61.3 kg)   21 1412 132 lb (59.9 kg)       Physical Exam:    Physical Exam     MEDICATIONS:  Scheduled Medications[1]  Medication Infusions[2]      Recent Labs     25  0528   WBC 6.3   HGB 7.6*   .0     Recent Labs     25  0528 25  0606 25  0843   BUN 85* 90* 89*   CREATSERUM 4.30* 4.54* 4.50*   MG  --   --  2.4    142 143   K 3.9 3.8 4.2    102 104   CO2 23.0 23.0 23.0   CA 9.2 9.6 9.4     No results for input(s): \"ALT\", \"AST\", \"ALB\" in the last 72 hours.    Invalid input(s): \"ALPHOS\", \"TBIL\", \"DBIL\", \"TPROT\"  No results for input(s): \"BNP\" in the last 72 hours.    Invalid input(s): \"TROPI\"  Lab Results   Component Value Date    PT 11.9 10/12/2013    PT 13.2 2013    PT 13.2 2013     INR 0.89 11/20/2020    INR 0.97 11/19/2017    INR 0.93 08/30/2016     Assessment:   SVT  -Reversion back to SR with IV CCB  2.     +Troponin                -No clear anginal Sx                -May reflect AQUILES or possibly PSVT  3.     AQUILES on CRI  4.     Hypernatremia on presentation                -Improved  5.     HTN  6.     HL  7.     DM II  8.     UTI  9.     Abn ECG  10.   Carotid plaque noted in the past  11.   Advanced age  12.   Dementia  13.   Deconditioning / debility  14.   Anemia                -Hb 7.6        Plan:  Stop diltiazem gtt  Start metoprolol succinate 25 mg daily  Hydralazine 25 mg PO q6h PRN   Family in agreement with conservative approach with medical Rx alone at this time.  Defer cardiodiagnostic testing, accepting of limitations in medical Rx alone and potential risks  Appreciated palliative care team input  OK to discharge from cardiology perspective    Angie Hernández, RANDI  4/23/2025  12:05 PM         [1]    insulin aspart  1-10 Units Subcutaneous TID AC and HS    heparin  5,000 Units Subcutaneous 2 times per day   [2]    dilTIAZem 5 mg/hr (04/22/25 2141)

## 2025-04-23 NOTE — PROGRESS NOTES
[facility] Hospitalist Progress Note     PCP: Cory Sanchez DO    Chief Complaint: follow-up   Follow up for: The primary encounter diagnosis was Hypernatremia. Diagnoses of Acute on chronic renal insufficiency, Type 2 MI (myocardial infarction) (HCC), Urinary tract infection with hematuria, site unspecified, and Dehydration, severe were also pertinent to this visit.    Overnight/Interim Events:    SUBJECTIVE:  Patient seen and examined.  More awake today, one word responses.   Family still discussing about dc dispo.   Ate breakfast today.  NAD.       OBJECTIVE:  Temp:  [97.3 °F (36.3 °C)-100.4 °F (38 °C)] 98.9 °F (37.2 °C)  Pulse:  [] 132  Resp:  [16-18] 18  BP: (114-147)/(36-54) 114/54  SpO2:  [90 %-99 %] 90 %    Intake/Output:    Intake/Output Summary (Last 24 hours) at 4/23/2025 0924  Last data filed at 4/23/2025 0800  Gross per 24 hour   Intake 286.5 ml   Output 750 ml   Net -463.5 ml           Last 3 Weights   04/22/25 0739 120 lb 12.8 oz (54.8 kg)   04/13/25 2256 126 lb (57.2 kg)   04/13/25 1841 126 lb (57.2 kg)   03/29/23 1434 135 lb (61.2 kg)   07/05/22 1058 135 lb 2 oz (61.3 kg)       Exam    General: no distress, appears stated age.  Awake, nonverbal    Head:  Normocephalic, without obvious abnormality, atraumatic.   Eyes:  Sclera anicteric, EOMs intact.    Nose: Nares normal,  Mucosa normal    Throat: Lips normal   Neck: Supple, symmetrical, trachea midline   Lungs:   Clear to auscultation bilaterally. Normal effort   Chest wall:  No tenderness or deformity   Heart:  Regular rate and rhythm, S1, S2 normal, no murmur, rub or gallop appreciated   Abdomen:   Soft, NT/ND, Bowel sounds normal. No masses,  No organomegaly.    Extremities: Extremities normal, atraumatic, no cyanosis or LE edema.   Skin: Skin color, texture, turgor normal. No rashes or lesions.    Neurologic: Moving all extremities spontaneously, no focal deficit appreciated.   Unable to follow commands for full neuro exam           Data  Review:       Labs:     Recent Labs   Lab 04/18/25  0628 04/19/25  0656 04/20/25  0611 04/21/25  0528   WBC 7.9 7.9 7.0 6.3   HGB 7.4* 7.6* 7.9* 7.6*   MCV 90.1 87.9 90.3 89.2   PLT 96.0* 108.0* 134.0* 153.0       Recent Labs   Lab 04/18/25  0628 04/19/25  0656 04/20/25  0611 04/21/25  0528 04/22/25  0606    139 137 139 142   K 3.9 4.0 3.9 3.9 3.8    104 104 103 102   CO2 22.0 23.0 22.0 23.0 23.0   BUN 84* 83* 84* 85* 90*   CREATSERUM 4.77* 4.77* 4.43* 4.30* 4.54*   CA 8.2* 8.4* 9.1 9.2 9.6   MG 2.4  --   --   --   --    * 123* 143* 160* 192*       Recent Labs   Lab 04/19/25  0656   ALT 11   AST 22   ALB 3.2       Recent Labs   Lab 04/22/25  0549 04/22/25  1122 04/22/25  1813 04/22/25  2102 04/23/25  0508   PGLU 204* 259* 175* 223* 192*       No results for input(s): \"TROP\" in the last 168 hours.      Meds:     Scheduled Medications[1]  Medication Infusions[2]  PRN Medications[3]       Assessment/Plan:     89 year old female with PMH including but not limited to HTN, CKD, DM, HL who p/t EH ED c weakness and hyperNa.      Toxic Metabolic Encephalopathy POA 2/2 Uremia, hypernatremia and UTI   Weakness  FTT  Prior Falls   Lethargy - improving   - likely 2/2 hyperNa and dementia and UTI and age   - PT/OT/SLP  - follow PO intake  - CTH chronic small vessel ischemic change   - CT spine multilevel degenerative disc disease in the cervical spine   - XR hip/shoulder no fx  - CXR noted, follow clinical sxs   - infection w/u so far negative  - CXR with LLL atelectasis/pleural effusion   - IV tylenol for pain   - neurology consulted for lethargy, EEG unremarkable - encephalopathy resolved, likely toxic/metabolic, given age and underlying dementia this is likely as close as she'll get back to her baseline      SVT 4/22 early   - cardizem gtt  - cardiology consulted  - transition to po today  - monitor on tele      Acute E.coli UTI POA, now treated  - completed CTX/ancef 1w course on 4/19   - Ucx >100K  EColi  - pan sens     Acute kidney injury on CKD4:   - B/l creatinine of 2.4-2.9 mg/dL in setting of long-standing HTN, diabetic nephropathy and advanced age. follows [facility]   - JOSE ANTONIO likely 2/2 pre-renal azotemia  - apprec recs, IVF, improving  - dc IVF, cont diuresis per renal   - avoid nephrotoxins  - CT bladder wall thickening, no renal or ureteral stones.   - monitor renal fxn      Hypernatremia, now resolved   - initially was on hypotonic fluid per renal  - follow BMP     Hyperkalemia   - per renal, IVF      HTN c urgency   - dc PTA arb/toresemide  - prn hydralazine     # constipation  bowel regimen     # Type 2 diabetes mellitus, controlled A1c 6.0   -Hyperglycemic protocol with accu-checks QID and low-dose sliding scale insulin. Will keep 1800 ADA diet. Will hold PO meds  - stressed imporatnce of BG control and compliance c meds to prevent long-term cardiac, vascular, neuro, renal complications     # B/l UE swelling  - RUE doppler neg  - likely from IV infiltration  - compression/elevation     # Hypoglycemia x1 in AM  - protocol, likely dec PO intake   - lower SSI if persists     # GOC  - pallaitive c/s apprec, FC for now, can re-assess as needed pending clinical situation. D/w RANDI Samuel   - 04/18/25 - Advanced care planning - 16 minutes were spent discussing advanced care planning exclusive of the documented time for this visit. D/w daughter, patient nonverbal. For now will maintain full code, she will discuss with her siblings regarding GOC.     # Proph  - sqh    Dispo: med  Medically stable for dc, family deciding on [facility] vs. Home/HHC vs. Hospice , palliative following      Lina Cardenas MD  [facility]  Message over [employer]  Pager: [phone]         [1]    insulin aspart  1-10 Units Subcutaneous TID AC and HS    heparin  5,000 Units Subcutaneous 2 times per day   [2]    dilTIAZem 5 mg/hr (04/22/25 8467)   [3]   menthol/methyl salicylate    hydrALAzine     acetaminophen    metoclopramide    glucose **OR** glucose **OR** glucose-vitamin C **OR** dextrose **OR** glucose **OR** glucose **OR** glucose-vitamin C    acetaminophen    polyethylene glycol (PEG 3350)    sennosides    bisacodyl    ondansetron

## 2025-04-23 NOTE — PLAN OF CARE
Received pt a/o x1, [place] speaking. VSS. Afebrile. No objective signs of pain noted. Medication admin per MAR. Cardizem gtt infusing, NSR on telemetry. Turned & repositioned. Goals of care planning ongoing. Call light within reach. Safety precautions in place.     Problem: SAFETY ADULT - FALL  Goal: Free from fall injury  Description: INTERVENTIONS:- Assess pt frequently for physical needs- Identify cognitive and physical deficits and behaviors that affect risk of falls.- Stevens fall precautions as indicated by assessment.- Educate pt/family on patient safety including physical limitations- Instruct pt to call for assistance with activity based on assessment- Modify environment to reduce risk of injury- Provide assistive devices as appropriate- Consider OT/PT consult to assist with strengthening/mobility- Encourage toileting schedule  Outcome: Progressing     Problem: CARDIOVASCULAR - ADULT  Goal: Absence of cardiac arrhythmias or at baseline  Description: INTERVENTIONS:- Continuous cardiac monitoring, monitor vital signs, obtain 12 lead EKG if indicated- Evaluate effectiveness of antiarrhythmic and heart rate control medications as ordered- Initiate emergency measures for life threatening arrhythmias- Monitor electrolytes and administer replacement therapy as ordered  Outcome: Progressing

## 2025-04-23 NOTE — PROGRESS NOTES
[facility]   part of [facility]     Nephrology Progress Note    Ann Herndon Patient Status:  Inpatient    1925 MRN [id number]   Location [facility] 4NW-A Attending Manuel Chan MD   Hosp Day # 10 PCP Cory Sanchez DO       SUBJECTIVE:  No acute events       Current Hospital Medications[1]          Physical Exam:   /43 (BP Location: Right arm)   Pulse 82   Temp 99.5 °F (37.5 °C) (Oral)   Resp 16   Ht 4' 9\" (1.448 m)   Wt 120 lb 12.8 oz (54.8 kg)   LMP  (LMP Unknown)   SpO2 91%   BMI 26.14 kg/m²   Temp (24hrs), Av.5 °F (36.9 °C), Min:97.3 °F (36.3 °C), Max:99.5 °F (37.5 °C)       Intake/Output Summary (Last 24 hours) at 2025 0742  Last data filed at 2025 0500  Gross per 24 hour   Intake 170 ml   Output 550 ml   Net -380 ml       Last 3 Weights   25 0739 120 lb 12.8 oz (54.8 kg)   25 2256 126 lb (57.2 kg)   25 1841 126 lb (57.2 kg)   23 1434 135 lb (61.2 kg)   22 1058 135 lb 2 oz (61.3 kg)   21 1412 132 lb (59.9 kg)   21 1435 133 lb (60.3 kg)     General: NAD; awake; alert; interacting w/ son  HEENT: No scleral icterus, MMM  Neck: Supple, no EILEEN or thyromegaly  Cardiac: Regular rate and rhythm, S1, S2 normal, no murmur or rub  Lungs: Clear without wheezes, rales, rhonchi.    Abdomen: Soft, non-tender. + bowel sounds, no palpable organomegaly  Extremities: no sig pitting edema LE  Neurologic: moving all extremities  Skin: Warm and dry, no rash    Recent Labs     25  0528   WBC 6.3   HGB 7.6*   MCV 89.2   .0       Recent Labs     25  0528 04/22/25  0606    142   K 3.9 3.8    102   CO2 23.0 23.0   BUN 85* 90*   CREATSERUM 4.30* 4.54*   CA 9.2 9.6       No results for input(s): \"ALT\", \"AST\", \"ALB\", \"AMYLASE\", \"LIPASE\", \"LDH\" in the last 72 hours.    Invalid input(s): \"ALPHOS\", \"TBIL\", \"DBIL\", \"TPROT\"    Impression/Plan:      1. Acute kidney injury on CKD IV: Has long-standing chronic kidney  disease stage 4 with baseline serum creatinine of 2.4-2.9 mg/dL in setting of long-standing HTN as of 2021- diabetic nephropathy and advanced age. Now presenting with acute kidney injury and hypernatremia in setting of worsening mental status and poor PO intake with concomitant diuretic use. JOSE ANTONIO likely 2/2 pre-renal azotemia, had been improving slowly with IVF but with worsening volume status IVF stopped and has been receiving intermittent diuretics.   -  Monitor labs;  No further eval, no indication and not candidate for HD      2. Hypernatremia: Due to decreased free water intake d/t worsening mental status.resolved with hypotonic fluids.  Encourage oral intake as able safely     3.HTN: BP stable on home hydralazine      Appreciate palliative care eval; discussions ongoing towards [facility]     Trev Carmona  4/23/2025              [1]   Current Facility-Administered Medications   Medication Dose Route Frequency    dilTIAZem (cardIZEM) 100 mg in sodium chloride 0.9% 100 mL IVPB-ADDV  5 mg/hr Intravenous Continuous    menthol/methyl salicylate (Thera-Gesic) 10-15 % cream   Topical TID PRN    hydrALAzine (Apresoline) 20 mg/mL injection 10 mg  10 mg Intravenous Q6H PRN    acetaminophen (Ofirmev) 10 mg/mL infusion premix 1,000 mg  1,000 mg Intravenous Q6H PRN    metoclopramide (Reglan) 5 mg/mL injection 5 mg  5 mg Intravenous Q8H PRN    glucose (Dex4) 15 GM/59ML oral liquid 15 g  15 g Oral Q15 Min PRN    Or    glucose (Glutose) 40% oral gel 15 g  15 g Oral Q15 Min PRN    Or    glucose-vitamin C (Dex-4) chewable tab 4 tablet  4 tablet Oral Q15 Min PRN    Or    dextrose 50% injection 50 mL  50 mL Intravenous Q15 Min PRN    Or    glucose (Dex4) 15 GM/59ML oral liquid 30 g  30 g Oral Q15 Min PRN    Or    glucose (Glutose) 40% oral gel 30 g  30 g Oral Q15 Min PRN    Or    glucose-vitamin C (Dex-4) chewable tab 8 tablet  8 tablet Oral Q15 Min PRN    insulin aspart (NovoLOG) 100 Units/mL FlexPen 1-10 Units  1-10 Units Subcutaneous  TID AC and HS    heparin (Porcine) 5000 UNIT/ML injection 5,000 Units  5,000 Units Subcutaneous 2 times per day    acetaminophen (Tylenol Extra Strength) tab 500 mg  500 mg Oral Q4H PRN    polyethylene glycol (PEG 3350) (Miralax) 17 g oral packet 17 g  17 g Oral Daily PRN    sennosides (Senokot) tab 17.2 mg  17.2 mg Oral Nightly PRN    bisacodyl (Dulcolax) 10 MG rectal suppository 10 mg  10 mg Rectal Daily PRN    ondansetron (Zofran) 4 MG/2ML injection 4 mg  4 mg Intravenous Q6H PRN

## 2025-04-25 LAB
ATRIAL RATE: 150 BPM
Q-T INTERVAL: 304 MS
QRS DURATION: 76 MS
QTC CALCULATION (BEZET): 473 MS
R AXIS: -35 DEGREES
T AXIS: 167 DEGREES
VENTRICULAR RATE: 146 BPM

## 2025-04-28 NOTE — PROGRESS NOTES
Skilled Nursing Facility, Subacute Rehab  [facility]     Ann Herndon Author: Maggi BlumRANDI     1925 MRN [id number]   [facility]  Admission 25      Last Hospital Discharge 25 PCP Cory Sanchez,      Hospital Discharge Diagnoses:  Hypernatremia   Dehydration   Acute on chronic renal insufficiency   Urinary tract infection   DM2        HPI:  Ann Herndon  : 1925, Age 89 year old  female patient is admitted for subacute rehab.  Patient has a past medical history of hypertension, chronic kidney disease, diabetes, hyperlipidemia who presented to the hospital with weakness and hypernatremia.  CT showed chronic small vessel ischemic change, CT spine multilevel degenerative disc disease, x-ray of hip and shoulder no fracture, chest x-ray with left lower lobe atelectasis-pleural effusion.  Neurology was consulted EEG was unremarkable encephalopathy resolved felt it was likely toxics/metabolic given age and underlying dementia.  Patient found to be dehydrated received IV fluids.  Patient's baseline creatinine at 2.4-2.9 mg/DL in the setting of longstanding hypertension, diabetic nephropathy and advanced age-patient follows Dr. Manzano. On  patient had SVT was on Cardizem drip cardiology consulted she was transitioned to oral metoprolol.  Found to have urinary tract infection greater than 100,000 E. coli and urine culture she completed Ancef 1 week course.  Patient consulted with palliative care while in hospital patient's family is considering POLST DNR but has not yet made that decision.  Admitting to [facility] for nursing care, medication management, and PT/OT/ST eval and tx.    Chief Complaint at visit:   Chief Complaint   Patient presents with    Follow - Up     Dehydration, renal insufficiency, UTI  Agitated, confused, decreased appetite, abdominal discomfort        Patient is seen laying down in bed in her room, daughter is present.  Daughter states yesterday  patient was doing very well sitting up in chair eating.  Today patient is not eating much is grimacing is more confused.  Patient remains on 2 L of oxygen she was not on home O2-in hospital chest x-ray showed atelectasis versus pleural effusion-will get chest x-ray.  Patient also grimacing and holding left side of abdomen daughter reports that she has not had a very good bowel movement but had a small BM yesterday however nursing reports no BM for 6 days.  Will order stat KUB for patient and suppository, increase stool softeners to twice daily.  Patient's baseline creatinine is at 2.4-2.9-last week creatinine was in the 4s MD was notified.  No repeat labs ordered so ordered labs for tomorrow.  Encouraged daughter to encourage patient to drink.  May need to consider IV fluids.  Will need to address ongoing goals of care conversations-patient was followed by palliative in the hospital and family was presented with POLST form but not ready to sign DNR at that time.  Given patient's age and multiple comorbidities will have ongoing discussions with patient-family.  Discussed with nursing.    ALLERGIES    She is allergic to bee venom.      CURRENT MEDS:    Medications Ordered Prior to this Encounter[1]      HISTORY:    She  has a past medical history of Anemia, Arthritis (1990), Back problem, Blood disorder, Cellulitis, leg, Dementia (HCC), Depression, Elevated blood pressure reading without diagnosis of hypertension, Esophageal reflux, Gastritis, GENITO-URINARY DISEASE, Glaucoma, HIGH BLOOD PRESSURE, HIGH CHOLESTEROL, Kidney disease (2006), Lumbar degenerative disc disease, Lumbar radiculitis, Lumbar spondylosis, MADHU (obstructive sleep apnea) (12/6/21 PSG), MADHU (obstructive sleep apnea) (4/8/2022), Osteoarthrosis, unspecified whether generalized or localized, unspecified site, Other and unspecified hyperlipidemia, Pain in limb, Personal history of urinary (tract) infection, Renal disorder, Shortness of breath, Spinal  stenosis, Type II or unspecified type diabetes mellitus without mention of complication, not stated as uncontrolled, Unspecified acute reaction to stress, Unspecified essential hypertension, and Visual impairment.    She  has a past surgical history that includes hysterectomy; angiogram (FEB 2013); appendectomy (1949); cataract surgery, complex (08/2010); hysterectomy (1974); cath percutaneous  transluminal coronary angioplasty (2/2012); appendectomy; angiogram (Feb 2013); injection, w/wo contrast, dx/therapeutic substance, epidural/subarachnoid; lumbar/sacral (8/30/2013); m-sedaj by  phys perfrmg svc 5+ yr (8/30/2013); fluor gid & loclzj ndl/cath spi dx/ther njx (8/30/2013); patient withough preoperative order for iv antibiotic surgical site infection prophylaxis. (8/30/2013); patient documented not to have experienced any of the following events (8/30/2013); injection, w/wo contrast, dx/therapeutic substance, epidural/subarachnoid; lumbar/sacral (9/16/2013); fluor gid & loclzj ndl/cath spi dx/ther njx (9/16/2013); m-sedaj by  phys perfrmg svc 5+ yr (9/16/2013); patient withough preoperative order for iv antibiotic surgical site infection prophylaxis. (9/16/2013); patient documented not to have experienced any of the following events (9/16/2013); upper gi endoscopy performed (10/7/13); injection, w/wo contrast, dx/therapeutic substance, epidural/subarachnoid; lumbar/sacral (10/8/2013); fluor gid & loclzj ndl/cath spi dx/ther njx (10/8/2013); m-sedaj by  phys perfrmg svc 5+ yr (10/8/2013); patient withough preoperative order for iv antibiotic surgical site infection prophylaxis. (10/8/2013); patient documented not to have experienced any of the following events (10/8/2013); injection, w/wo contrast, dx/therapeutic substance, epidural/subarachnoid; lumbar/sacral (N/A, 6/24/2014); fluor gid & loclzj ndl/cath spi dx/ther njx (6/24/2014); m-sedaj by  phys perfrmg svc 5+ yr (6/24/2014); patient withough  preoperative order for iv antibiotic surgical site infection prophylaxis. (6/24/2014); patient documented not to have experienced any of the following events (6/24/2014); injection, w/wo contrast, dx/therapeutic substance, epidural/subarachnoid; lumbar/sacral (N/A, 7/24/2014); fluor gid & loclzj ndl/cath spi dx/ther njx (N/A, 7/24/2014); m-sedaj by  phys perfrmg svc 5+ yr (N/A, 7/24/2014); patient withough preoperative order for iv antibiotic surgical site infection prophylaxis. (N/A, 7/24/2014); patient documented not to have experienced any of the following events (N/A, 7/24/2014); colonoscopy & polypectomy (11/15/16= Polyp (serrated)); and colonoscopy (N/A, 11/15/2016).        CODE STATUS:  Full Code  POLST form discussed with family in hospital they were not ready to sign at that time but are thinking about this decision.     ADVANCED CARE PLANNING TEAM: Palliative seen in hospital recommend ongoing follow up.     SUBJECTIVE:    REVIEW OF SYSTEMS:  Limited currently as patient does not feel well according to daughter   Remains on o2 2 L NC.   No current palpitations last week HR elevated given extra metoprolol and heart rate normalized   Reports of no recent BM     OBJECTIVE:  VITALS:  BP (!) 171/63   Pulse 60   Temp 97.3 °F (36.3 °C)   Resp 20   LMP  (LMP Unknown)   SpO2 97%     PHYSICAL EXAM:  GENERAL HEALTH: alert frail 89 year old female patient   LINES, TUBES, DRAINS:  none  SKIN: no rashes, no suspicious lesions  WOUND: see wound assessment   EYES: PERRLA, conjunctiva normal; no drainage from eyes  HENT: normocephalic; normal nose, no nasal drainage, mucous membranes pink, moist  RESPIRATORY:Diminished to bases , No wheezing/cough/accessory muscle use; 2 L NC 99%   CARDIOVASCULAR: S1, S2 normal, RRR; no S3, no S4;   ABDOMEN: normal active BS+, soft, slight distention to left side of abdomen, slight tenderness to left side of abdomen   : Deferred  LYMPHATIC: no lymphedema  MUSCULOSKELETAL: no  acute synovitis upper or lower extremity.  Weakness R/T recent hospitalization/diagnoses/sequelae; will undergo therapies to rehab and improve strength, endurance and independence w/ ADLs as able  EXTREMITIES/VASCULAR: no cyanosis, clubbing or edema  NEUROLOGIC: intact; no sensorimotor deficit  PSYCHIATRIC: alert, speaking in [place] to daughter only but she states she is more confused     DIAGNOSTICS REVIEWED AT THIS VISIT:  Vital signs reviewed in [facility] EMR.  [facility] medical records, notes, lab and imaging results reviewed. And diagnostics available in rehab records/[facility].  Medication reconciliation completed.      4/24/25  Wbc 10.46 hgb 8.0 hematocrit 26.9 plt 232   Glucose 191 bun 103 creat 4.34 bili 0.36 prot 6.3 alb 3.4 sodium 147 potassium 4.2 chl 105 co2 25 alt < 7 ast 31 alk phos 85 ca 8.8 gfr 9     Lab Results   Component Value Date     (H) 04/23/2025    BUN 89 (H) 04/23/2025    BUNCREA 22.0 (H) 03/18/2022    CREATSERUM 4.50 (H) 04/23/2025    ANIONGAP 16 04/23/2025    GFR 19 (L) 11/19/2017    GFRNAA 19 (L) 03/22/2021    GFRAA 22 (L) 03/22/2021    CA 9.4 04/23/2025    OSMOCALC 329 (H) 04/23/2025    ALKPHO 72 04/19/2025    AST 22 04/19/2025    ALT 11 04/19/2025    BILT 0.2 04/19/2025    TP 5.8 04/19/2025    ALB 3.2 04/19/2025    GLOBULIN 2.6 04/19/2025    AGRATIO 1.7 12/30/2015     04/23/2025    K 4.2 04/23/2025     04/23/2025    CO2 23.0 04/23/2025       Lab Results   Component Value Date    WBC 6.3 04/21/2025    RBC 2.69 (L) 04/21/2025    HGB 7.6 (L) 04/21/2025    HCT 24.0 (L) 04/21/2025    .0 04/21/2025    MPV 12.2 01/16/2013    MCV 89.2 04/21/2025    MCH 28.3 04/21/2025    MCHC 31.7 04/21/2025    RDW 12.9 04/21/2025    NEPRELIM 5.11 04/21/2025    NEPERCENT 81.2 04/21/2025    LYPERCENT 9.5 04/21/2025    MOPERCENT 7.3 04/21/2025    EOPERCENT 1.0 04/21/2025    BAPERCENT 0.2 04/21/2025    NE 5.11 04/21/2025    LYMABS 0.60 (L) 04/21/2025    MOABSO 0.46 04/21/2025     EOABSO 0.06 04/21/2025    BAABSO 0.01 04/21/2025                US VENOUS DOPPLER ARM LEFT - DIAG IMG (CPT=93971)  Narrative: PROCEDURE:  US VENOUS DOPPLER ARM LEFT - DIAG IMG (CPT=93971)     COMPARISON:  None.     INDICATIONS:  swelling     TECHNIQUE:  Real time, grey scale, and duplex ultrasound was used to evaluate the upper extremity venous system. B-mode two-dimensional images of the vascular structures, Doppler spectral analysis, and color flow.  Doppler imaging were performed.  The   following veins were imaged:  Subclavian, Jugular, Axillary, Brachial, Basilic, Cephalic, and the contralateral Subclavian and Jugular.     PATIENT STATED HISTORY: (As transcribed by Technologist)           FINDINGS:    EXTREMITY:  Left upper extremity  THROMBI:  None visible.  COMPRESSION:  Normal compressibility, phasicity, and augmentation of the subclavian, jugular, axillary, brachial, basilic, and cephalic veins.  OTHER:  Negative.                   Impression: CONCLUSION:  No evidence of DVT in the left upper extremity.        LOCATION:  [facility]        Dictated by (CST): Cristo Hawk MD on 4/20/2025 at 11:12 AM       Finalized by (CST): Cristo Hawk MD on 4/20/2025 at 11:12 AM              SEE PLAN BELOW  Assessment/plans    Condition change   Daughter notes patient more agitated, grimacing, not eating as much   - Continue to monitor vital signs  - Labs for 4-29  - Stat chest x-ray  - Stat KUB it is reported patient has not had a BM for several days she has slight distention and pain to left side of abdomen.  - Notify provider of further change in condition    Toxic metabolic encephalopathy Uremia, hypernatremia, UTI, weakness  Likely 2/2 hypernatremia and dementia along with UTI and age. CT in hospital showed small vessel ischemic change.  CT of spine showed degenerative disc disease.  X-ray of hip-shoulder showed no fracture.  Chest x-ray showed atelectasis versus pleural effusion.  Neurology consulted in  hospital EEG unremarkable encephalopathy resolved likely toxic-metabolic.  - PT/OT   - Vital signs q shift and prn   - Labs weekly and prn   - Dietary to follow     SVT 4/22  Received Cardizem gtt in hospital. Cardiology consulted- transitioned to oral medication   - vital signs q shift and prn   - Labs weekly and prn   - Metoprolol 25 mg daily     Acute E. coli UTI  Completed Ancef 1 week course on 4-19.  Urine culture greater than 100,000 E. coli.  -Monitor signs and symptoms of infection.  - Monitor labs    JOSE ANTONIO on CKD   Creatinine baseline at 2.4-2.9 mg/DL in setting of longstanding hypertension, diabetic nephropathy, and advanced age.  CT bladder wall thickening no renal or ureteral stones.  -Vital signs every shift as needed  - Labs weekly as needed  - Follow-up with renal Dr. Manzano   - Avoid nephrotoxin    Hypernatremia   - IV fluids in hospital  - Monitor labs    Hyperkalemia  - Monitor labs    Hypertension  - Vital signs every shift and as needed  - Labs weekly and as needed  - Metoprolol 25 mg daily   - Hydralazine 25 mg every 6 hrs prn  as needed    Diabetes  - Diabetic diet  - Accu-Cheks  - Glimepiride 1 mg daily    Goals of care  Palliative care followed patient in hospital.  POLST form was discussed with family and they are considering this decision.  - Ongoing goals of care conversations  - Palliative follow-up    Physical Deconditioning/Impaired mobility and ADLs/At risk for falling  -Fall Precautions  -PT/OT/ST to evaluate and treat  -[facility] team to establish care plan meeting with patient and POA/family as appropriate  -Anticipate DC on or before TBD; SW to assist patient/family w/ DC planning  -DC Plan:  TBD    DVT Prophylaxis   -Encourage early mobilization and participation in PT/OT as able    Pain Management  -Offer to pre-medicate 30-60 min prior to therapy  -Physiatry evaluation with management appreciated  -acetaminophen prn     Bowel Management Regimen/Constipation   -Monitor BM status - patient  staff reported no BM in several days   - 4/28 stat KUB   - Miralax prn   - Senna S 1 tab bid   - Dulcolax supp daily prn      Vitamins/supplements as r/t deficiencies  -[facility] RD to follow while in rehab; supplementation/diet as per [facility] RD  -May continue home supplements    LABS  -CBC/CMP weekly while in [facility]- 4/29     *Follow-Up with PCP within 1-2 weeks following [facility] discharge.   *Follow-Up with specialists as recommended.  Future Appointments   Date Time Provider Department Center   5/29/2025  1:00 PM Noé Manzano MD [facility]       *Greater than 65 minutes spent w/ patient and family, reviewing medical records, labs, completing medication reconciliation and entering orders to establish plan of care in [facility].    Maggi Blum, RANDI  04/28/25                    [1]   Current Outpatient Medications on File Prior to Visit   Medication Sig    acetaminophen 325 MG Oral Tab Take 2 tablets (650 mg total) by mouth every 6 (six) hours as needed for Pain.    glimepiride 1 MG Oral Tab Take 1 tablet (1 mg total) by mouth daily with breakfast.    senna-docusate 8.6-50 MG Oral Tab Take 1 tablet by mouth daily.    polyethylene glycol, PEG 3350, 17 g Oral Powd Pack Take 17 g by mouth daily as needed (constipation).    bisacodyl 10 MG Rectal Suppos Place 1 suppository (10 mg total) rectally daily as needed (constipation).    metoprolol succinate ER 25 MG Oral Tablet 24 Hr Take 1 tablet (25 mg total) by mouth Daily Beta Blocker.    hydrALAZINE 25 MG Oral Tab Take 1 tablet (25 mg total) by mouth every 6 (six) hours as needed (For SBP >160).    GLIMEPIRIDE 1 MG Oral Tab TAKE 1 TABLET(1 MG) BY MOUTH DAILY WITH BREAKFAST     No current facility-administered medications on file prior to visit.

## 2025-04-29 ENCOUNTER — APPOINTMENT (OUTPATIENT)
Dept: GENERAL RADIOLOGY | Facility: HOSPITAL | Age: OVER 89
End: 2025-04-29
Attending: EMERGENCY MEDICINE
Payer: MEDICARE

## 2025-04-29 ENCOUNTER — HOSPITAL ENCOUNTER (INPATIENT)
Facility: HOSPITAL | Age: OVER 89
LOS: 3 days | Discharge: HOSPICE | End: 2025-05-02
Attending: EMERGENCY MEDICINE | Admitting: INTERNAL MEDICINE
Payer: MEDICARE

## 2025-04-29 ENCOUNTER — HOSPITAL ENCOUNTER (INPATIENT)
Facility: HOSPITAL | Age: OVER 89
LOS: 3 days | Discharge: HOSPICE | End: 2025-05-02
Attending: EMERGENCY MEDICINE | Admitting: INTERNAL MEDICINE
Payer: MEDICARE

## 2025-04-29 DIAGNOSIS — N18.5 CKD (CHRONIC KIDNEY DISEASE) STAGE 5, GFR LESS THAN 15 ML/MIN (HCC): ICD-10-CM

## 2025-04-29 DIAGNOSIS — E87.0 HYPERNATREMIA: Primary | ICD-10-CM

## 2025-04-29 LAB
ALBUMIN SERPL-MCNC: 3.9 G/DL (ref 3.2–4.8)
ALBUMIN/GLOB SERPL: 1.1 {RATIO} (ref 1–2)
ALP LIVER SERPL-CCNC: 80 U/L (ref 55–142)
ALT SERPL-CCNC: 16 U/L (ref 10–49)
ANION GAP SERPL CALC-SCNC: 10 MMOL/L (ref 0–18)
AST SERPL-CCNC: 35 U/L (ref ?–34)
BASOPHILS # BLD AUTO: 0.03 X10(3) UL (ref 0–0.2)
BASOPHILS NFR BLD AUTO: 0.3 %
BILIRUB SERPL-MCNC: 0.4 MG/DL (ref 0.2–0.9)
BILIRUB UR QL STRIP.AUTO: NEGATIVE
BUN BLD-MCNC: 82 MG/DL (ref 9–23)
CALCIUM BLD-MCNC: 9.8 MG/DL (ref 8.7–10.6)
CHLORIDE SERPL-SCNC: 113 MMOL/L (ref 98–112)
CLARITY UR REFRACT.AUTO: CLEAR
CO2 SERPL-SCNC: 27 MMOL/L (ref 21–32)
CREAT BLD-MCNC: 4.23 MG/DL (ref 0.55–1.02)
EGFRCR SERPLBLD CKD-EPI 2021: 9 ML/MIN/1.73M2 (ref 60–?)
EOSINOPHIL # BLD AUTO: 0.08 X10(3) UL (ref 0–0.7)
EOSINOPHIL NFR BLD AUTO: 0.9 %
ERYTHROCYTE [DISTWIDTH] IN BLOOD BY AUTOMATED COUNT: 14.4 %
FLUAV + FLUBV RNA SPEC NAA+PROBE: NEGATIVE
FLUAV + FLUBV RNA SPEC NAA+PROBE: NEGATIVE
GLOBULIN PLAS-MCNC: 3.5 G/DL (ref 2–3.5)
GLUCOSE BLD-MCNC: 291 MG/DL (ref 70–99)
GLUCOSE UR STRIP.AUTO-MCNC: 70 MG/DL
HCT VFR BLD AUTO: 29.7 % (ref 35–48)
HGB BLD-MCNC: 8.9 G/DL (ref 12–16)
HYALINE CASTS #/AREA URNS AUTO: PRESENT /LPF
IMM GRANULOCYTES # BLD AUTO: 0.03 X10(3) UL (ref 0–1)
IMM GRANULOCYTES NFR BLD: 0.3 %
KETONES UR STRIP.AUTO-MCNC: NEGATIVE MG/DL
LEUKOCYTE ESTERASE UR QL STRIP.AUTO: 250
LYMPHOCYTES # BLD AUTO: 1.63 X10(3) UL (ref 1–4)
LYMPHOCYTES NFR BLD AUTO: 17.9 %
MCH RBC QN AUTO: 27.6 PG (ref 26–34)
MCHC RBC AUTO-ENTMCNC: 30 G/DL (ref 31–37)
MCV RBC AUTO: 92.2 FL (ref 80–100)
MONOCYTES # BLD AUTO: 0.49 X10(3) UL (ref 0.1–1)
MONOCYTES NFR BLD AUTO: 5.4 %
NEUTROPHILS # BLD AUTO: 6.84 X10 (3) UL (ref 1.5–7.7)
NEUTROPHILS # BLD AUTO: 6.84 X10(3) UL (ref 1.5–7.7)
NEUTROPHILS NFR BLD AUTO: 75.2 %
NITRITE UR QL STRIP.AUTO: NEGATIVE
OSMOLALITY SERPL CALC.SUM OF ELEC: 345 MOSM/KG (ref 275–295)
PH UR STRIP.AUTO: 7.5 [PH] (ref 5–8)
PLATELET # BLD AUTO: 279 10(3)UL (ref 150–450)
POTASSIUM SERPL-SCNC: 4.4 MMOL/L (ref 3.5–5.1)
PROT SERPL-MCNC: 7.4 G/DL (ref 5.7–8.2)
PROT UR STRIP.AUTO-MCNC: 100 MG/DL
RBC # BLD AUTO: 3.22 X10(6)UL (ref 3.8–5.3)
RSV RNA SPEC NAA+PROBE: NEGATIVE
SARS-COV-2 RNA RESP QL NAA+PROBE: NOT DETECTED
SODIUM SERPL-SCNC: 150 MMOL/L (ref 136–145)
SP GR UR STRIP.AUTO: 1.01 (ref 1–1.03)
UROBILINOGEN UR STRIP.AUTO-MCNC: NORMAL MG/DL
WBC # BLD AUTO: 9.1 X10(3) UL (ref 4–11)

## 2025-04-29 PROCEDURE — 71045 X-RAY EXAM CHEST 1 VIEW: CPT | Performed by: EMERGENCY MEDICINE

## 2025-04-29 RX ORDER — NICOTINE POLACRILEX 4 MG
30 LOZENGE BUCCAL
Status: DISCONTINUED | OUTPATIENT
Start: 2025-04-29 | End: 2025-05-02

## 2025-04-29 RX ORDER — ASCORBIC ACID, THIAMINE, RIBOFLAVIN, NIACINAMIDE, PYRIDOXINE, FOLIC ACID, COBALAMIN, BIOTIN, PANTOTHENIC ACID 100; 1.5; 1.7; 20; 10; 1; 6; 300; 1 MG/1; MG/1; MG/1; MG/1; MG/1; MG/1; UG/1; UG/1; MG/1
1 TABLET, COATED ORAL
Status: DISCONTINUED | OUTPATIENT
Start: 2025-04-30 | End: 2025-05-02

## 2025-04-29 RX ORDER — ONDANSETRON 2 MG/ML
4 INJECTION INTRAMUSCULAR; INTRAVENOUS EVERY 6 HOURS PRN
Status: DISCONTINUED | OUTPATIENT
Start: 2025-04-29 | End: 2025-05-02

## 2025-04-29 RX ORDER — SIMETHICONE 80 MG
80 TABLET,CHEWABLE ORAL EVERY 6 HOURS PRN
COMMUNITY

## 2025-04-29 RX ORDER — METOCLOPRAMIDE HYDROCHLORIDE 5 MG/ML
5 INJECTION INTRAMUSCULAR; INTRAVENOUS EVERY 8 HOURS PRN
Status: DISCONTINUED | OUTPATIENT
Start: 2025-04-29 | End: 2025-05-02

## 2025-04-29 RX ORDER — METOPROLOL SUCCINATE 25 MG/1
25 TABLET, EXTENDED RELEASE ORAL
Status: DISCONTINUED | OUTPATIENT
Start: 2025-04-30 | End: 2025-05-02

## 2025-04-29 RX ORDER — SODIUM CHLORIDE 450 MG/100ML
INJECTION, SOLUTION INTRAVENOUS CONTINUOUS
Status: DISCONTINUED | OUTPATIENT
Start: 2025-04-29 | End: 2025-04-30

## 2025-04-29 RX ORDER — NICOTINE POLACRILEX 4 MG
15 LOZENGE BUCCAL
Status: DISCONTINUED | OUTPATIENT
Start: 2025-04-29 | End: 2025-05-02

## 2025-04-29 RX ORDER — POLYETHYLENE GLYCOL 3350 17 G/17G
17 POWDER, FOR SOLUTION ORAL DAILY PRN
Status: DISCONTINUED | OUTPATIENT
Start: 2025-04-29 | End: 2025-05-02

## 2025-04-29 RX ORDER — INSULIN LISPRO 100 [IU]/ML
INJECTION, SOLUTION INTRAVENOUS; SUBCUTANEOUS
COMMUNITY
End: 2025-05-02

## 2025-04-29 RX ORDER — BISACODYL 10 MG
10 SUPPOSITORY, RECTAL RECTAL
Status: DISCONTINUED | OUTPATIENT
Start: 2025-04-29 | End: 2025-04-29

## 2025-04-29 RX ORDER — SENNOSIDES 8.6 MG
17.2 TABLET ORAL NIGHTLY PRN
Status: DISCONTINUED | OUTPATIENT
Start: 2025-04-29 | End: 2025-05-02

## 2025-04-29 RX ORDER — SENNA AND DOCUSATE SODIUM 50; 8.6 MG/1; MG/1
1 TABLET, FILM COATED ORAL 2 TIMES DAILY
Status: DISCONTINUED | OUTPATIENT
Start: 2025-04-29 | End: 2025-05-02

## 2025-04-29 RX ORDER — ACETAMINOPHEN 500 MG
500 TABLET ORAL EVERY 4 HOURS PRN
Status: DISCONTINUED | OUTPATIENT
Start: 2025-04-29 | End: 2025-05-02

## 2025-04-29 RX ORDER — HEPARIN SODIUM 5000 [USP'U]/ML
5000 INJECTION, SOLUTION INTRAVENOUS; SUBCUTANEOUS EVERY 8 HOURS SCHEDULED
Status: DISCONTINUED | OUTPATIENT
Start: 2025-04-29 | End: 2025-05-02

## 2025-04-29 RX ORDER — VITAMIN B COMPLEX-VITAMIN C-FOLIC ACID 0.8 MG TABLET
1 TABLET
COMMUNITY

## 2025-04-29 RX ORDER — HYDRALAZINE HYDROCHLORIDE 25 MG/1
25 TABLET, FILM COATED ORAL EVERY 6 HOURS PRN
Status: DISCONTINUED | OUTPATIENT
Start: 2025-04-29 | End: 2025-04-30

## 2025-04-29 RX ORDER — SIMETHICONE 80 MG
80 TABLET,CHEWABLE ORAL EVERY 6 HOURS PRN
Status: DISCONTINUED | OUTPATIENT
Start: 2025-04-29 | End: 2025-05-02

## 2025-04-29 RX ORDER — DEXTROSE MONOHYDRATE 25 G/50ML
50 INJECTION, SOLUTION INTRAVENOUS
Status: DISCONTINUED | OUTPATIENT
Start: 2025-04-29 | End: 2025-05-02

## 2025-04-29 RX ORDER — BISACODYL 10 MG
10 SUPPOSITORY, RECTAL RECTAL
Status: DISCONTINUED | OUTPATIENT
Start: 2025-04-29 | End: 2025-05-02

## 2025-04-29 NOTE — ED INITIAL ASSESSMENT (HPI)
Pt to ER from [facility] for elevated Na+, Cret and BUN. Pt A&O x 1 at baseline. Pt has hx of renal failure.

## 2025-04-29 NOTE — ED PROVIDER NOTES
Patient Seen in: [facility] Emergency Department      History     Chief Complaint   Patient presents with    Abnormal Result     Abnormal labs, hypernatremia 157 elevated bun/creatinine. Alert x1 at baseline      Stated Complaint: elvated BUN and Cret. known renal failure    Subjective:   HPI    89-year-old female presents from her nursing home for elevated sodium.  Patient is alert but nonverbal at baseline.  Further history is difficult to obtain due to the patient's nonverbal status.    History of Present Illness               Objective:     Past Medical History:    Anemia    Arthritis    Back problem    herniated discs L4-L5    Blood disorder    anemia    Cellulitis, leg    Dementia (HCC)    Depression    Elevated blood pressure reading without diagnosis of hypertension    Esophageal reflux    Gastritis    GENITO-URINARY DISEASE    Glaucoma    HIGH BLOOD PRESSURE    HIGH CHOLESTEROL    Kidney disease    Lumbar degenerative disc disease    Lumbar radiculitis    Lumbar spondylosis    MADHU (obstructive sleep apnea)    AHI 5 RDI 5 REM AHI 0 Supine AHI 2 non-supine AHI 6     MADHU (obstructive sleep apnea)    Osteoarthrosis, unspecified whether generalized or localized, unspecified site    Other and unspecified hyperlipidemia    Pain in limb    Personal history of urinary (tract) infection    below th eknee    Renal disorder    elevated BUN and creatinine    Shortness of breath    uses oxygen 2.5 L/NC every night for hx of low saturation identified during sleep study 2014    Spinal stenosis    Type II or unspecified type diabetes mellitus without mention of complication, not stated as uncontrolled    Unspecified acute reaction to stress    Unspecified essential hypertension    Visual impairment    glasses              Past Surgical History:   Procedure Laterality Date    Angiogram  FEB 2013    Angiogram  Feb 2013    Done in [facility]    Appendectomy  1949    Appendectomy      Cataract surgery, complex  08/2010     Cath percutaneous  transluminal coronary angioplasty  2/2012    Normal in [place]    Colonoscopy N/A 11/15/2016    Procedure: COLONOSCOPY;  Surgeon: Levar Callaway MD;  Location:  ENDOSCOPY    Colonoscopy & polypectomy  11/15/16= Polyp (serrated)    Repeat PRN    Fluor gid & loclzj ndl/cath spi dx/ther njx  8/30/2013    Procedure: LUMBAR EPIDURAL;  Surgeon: Srini Benjamin MD;  Location: [facility]    Fluor gid & loclzj ndl/cath spi dx/ther njx  9/16/2013    Procedure: LUMBAR EPIDURAL;  Surgeon: Srini Benjamin MD;  Location: [facility] [place] [facility]    Fluor gid & loclzj ndl/cath spi dx/ther njx  10/8/2013    Procedure: LUMBAR EPIDURAL;  Surgeon: Srini Benjamin MD;  Location: [facility] [place] [facility]    Fluor gid & loclzj ndl/cath spi dx/ther njx  6/24/2014    Procedure: ;  Surgeon: Srini Benjmain MD;  Location: [facility] [place] [facility]    Fluor gid & loclzj ndl/cath spi dx/ther njx N/A 7/24/2014    Procedure: LUMBAR EPIDURAL;  Surgeon: Srini Benjamin MD;  Location: [facility]    Hysterectomy      Hysterectomy  1974    Injection, w/wo contrast, dx/therapeutic substance, epidural/subarachnoid; lumbar/sacral  8/30/2013    Procedure: LUMBAR EPIDURAL;  Surgeon: Srini Benjamin MD;  Location: [facility] [place] [facility]    Injection, w/wo contrast, dx/therapeutic substance, epidural/subarachnoid; lumbar/sacral  9/16/2013    Procedure: LUMBAR EPIDURAL;  Surgeon: Srini Benjamin MD;  Location: [facility]    Injection, w/wo contrast, dx/therapeutic substance, epidural/subarachnoid; lumbar/sacral  10/8/2013    Procedure: LUMBAR EPIDURAL;  Surgeon: Srini Benjamin MD;  Location: [facility]    Injection, w/wo contrast, dx/therapeutic substance, epidural/subarachnoid; lumbar/sacral N/A 6/24/2014    Procedure: LUMBAR EPIDURAL;  Surgeon: Srini Benjamin MD;  Location: [facility]    Injection, w/wo  contrast, dx/therapeutic substance, epidural/subarachnoid; lumbar/sacral N/A 7/24/2014    Procedure: LUMBAR EPIDURAL;  Surgeon: Srini Benjamin MD;  Location: [facility]    M-sedaj by  phys perfrmg svc 5+ yr  8/30/2013    Procedure: LUMBAR EPIDURAL;  Surgeon: Srini Benjamin MD;  Location: [facility]    M-sedaj by  phys perfrmg svc 5+ yr  9/16/2013    Procedure: LUMBAR EPIDURAL;  Surgeon: Srini Benjamin MD;  [facility]    M-sedaj by  phys perfrmg svc 5+ yr  10/8/2013    Procedure: LUMBAR EPIDURAL;  Surgeon: Srini Benjamin MD;  Location: [facility]    M-sedaj by  phys perfrmg svc 5+ yr  6/24/2014    Procedure: ;  Surgeon: Srini Benjamin MD;  Location: [facility]    M-sedaj by  phys perfrmg svc 5+ yr N/A 7/24/2014    Procedure: LUMBAR EPIDURAL;  Surgeon: Srini Benjamin MD;  Location: [facility]    Patient documented not to have experienced any of the following events  8/30/2013    Procedure: LUMBAR EPIDURAL;  Surgeon: Srini Benjamin MD;  [facility]    Patient documented not to have experienced any of the following events  9/16/2013    Procedure: LUMBAR EPIDURAL;  Surgeon: Srini Benjamin MD;  Location: [facility]    Patient documented not to have experienced any of the following events  10/8/2013    Procedure: LUMBAR EPIDURAL;  Surgeon: Srini Benjamin MD;  [facility]    Patient documented not to have experienced any of the following events  6/24/2014    Procedure: ;  Surgeon: Srini Benjamin MD;  [facility]    Patient documented not to have experienced any of the following events N/A 7/24/2014    Procedure: LUMBAR EPIDURAL;  Surgeon: Srini Benjamin MD;  Location: [facility]    Patient withough preoperative order for iv antibiotic surgical site  infection prophylaxis.  8/30/2013    Procedure: LUMBAR EPIDURAL;  Surgeon: Srini Benjamin MD;  Location: [facility]    Patient withough preoperative order for iv antibiotic surgical site infection prophylaxis.  9/16/2013    Procedure: LUMBAR EPIDURAL;  Surgeon: Srini Benjamin MD;  Location: [facility]    Patient withough preoperative order for iv antibiotic surgical site infection prophylaxis.  10/8/2013    Procedure: LUMBAR EPIDURAL;  Surgeon: Srini Benjamin MD;  Location: [facility]    Patient withough preoperative order for iv antibiotic surgical site infection prophylaxis.  6/24/2014    Procedure: ;  Surgeon: Srini Benjamin MD;  Location: [facility]    Patient withough preoperative order for iv antibiotic surgical site infection prophylaxis. N/A 7/24/2014    Procedure: LUMBAR EPIDURAL;  Surgeon: Srini Benjamin MD;  Location: [facility]    Upper gi endoscopy performed  10/7/13                No pertinent social history.                              Physical Exam     ED Triage Vitals [04/29/25 1749]   BP (!) 161/68   Pulse 68   Resp 18   Temp 97 °F (36.1 °C)   Temp src Temporal   SpO2 97 %   O2 Device None (Room air)       Current Vitals:   Vital Signs  BP: (!) 161/68  Pulse: 57  Resp: 19  Temp: 97 °F (36.1 °C)  Temp src: Temporal  MAP (mmHg): 95    Oxygen Therapy  SpO2: 97 %  O2 Device: None (Room air)        Physical Exam     Physical Exam         General: Alert, mumbling, no distress  HEENT:  Pupils equal reactive.  Extraocular motions intact. Dry MM  Neck: Supple  Lungs: Clear to auscultation bilaterally.  Heart: Regular rate and rhythm.  Abdomen: Soft, nontender.   Skin: No rash.  No edema.  Neurologic: No focal neurologic deficits.    Musculoskeletal: No tenderness or deformity noted.            ED Course     Labs Reviewed   COMP METABOLIC PANEL (14) - Abnormal; Notable for the following components:        Result Value    Glucose 291 (*)     Sodium 150 (*)     Chloride 113 (*)     BUN 82 (*)     Creatinine 4.23 (*)     Calculated Osmolality 345 (*)     eGFR-Cr 9 (*)     AST 35 (*)     All other components within normal limits   CBC WITH DIFFERENTIAL WITH PLATELET - Abnormal; Notable for the following components:    RBC 3.22 (*)     HGB 8.9 (*)     HCT 29.7 (*)     MCHC 30.0 (*)     All other components within normal limits   URINALYSIS WITH CULTURE REFLEX     EKG    Rate, intervals and axes as noted on EKG Report.  Rate: 71  Rhythm: Sinus Rhythm  Reading: No ST elevation or depression, occasional PVCs              Results                                 MDM      Differential diagnosis includes pneumonia, UTI, metabolic disturbance, JOSE ANTONIO    Admission disposition: 4/29/2025  7:20 PM       I have independently visualized the radiology images, my focus/limited interpretation: No focal consolidation    Defer to radiologist for other/incidental findings    Sodium 150.  BUN and creatinine are at baseline.    Medical Decision Making  Amount and/or Complexity of Data Reviewed  External Data Reviewed: notes.     Details: Reviewed discharge summary from 4/23.  Patient was admitted for JOSE ANTONIO/dehydration/hypernatremia    Spoke with Dr Major nephrology:  will place admit orders    Spoke with Dr Chan for admit    Disposition and Plan     Clinical Impression:  1. Hypernatremia    2. CKD (chronic kidney disease) stage 5, GFR less than 15 ml/min ([facility])         Disposition:  Admit  4/29/2025  7:20 pm    Follow-up:  No follow-up provider specified.        Medications Prescribed:  Current Discharge Medication List          Supplementary Documentation:         Hospital Problems       Present on Admission  Date Reviewed: 4/7/2025          ICD-10-CM Noted POA    * (Principal) Hypernatremia E87.0 4/13/2025 Unknown

## 2025-04-30 LAB
ANION GAP SERPL CALC-SCNC: 13 MMOL/L (ref 0–18)
ATRIAL RATE: 71 BPM
BASOPHILS # BLD AUTO: 0.05 X10(3) UL (ref 0–0.2)
BASOPHILS NFR BLD AUTO: 0.5 %
BUN BLD-MCNC: 72 MG/DL (ref 9–23)
CALCIUM BLD-MCNC: 9.6 MG/DL (ref 8.7–10.6)
CHLORIDE SERPL-SCNC: 117 MMOL/L (ref 98–112)
CO2 SERPL-SCNC: 23 MMOL/L (ref 21–32)
CREAT BLD-MCNC: 4.14 MG/DL (ref 0.55–1.02)
EGFRCR SERPLBLD CKD-EPI 2021: 9 ML/MIN/1.73M2 (ref 60–?)
EOSINOPHIL # BLD AUTO: 0.17 X10(3) UL (ref 0–0.7)
EOSINOPHIL NFR BLD AUTO: 1.8 %
ERYTHROCYTE [DISTWIDTH] IN BLOOD BY AUTOMATED COUNT: 14.4 %
GLUCOSE BLD-MCNC: 171 MG/DL (ref 70–99)
GLUCOSE BLD-MCNC: 176 MG/DL (ref 70–99)
GLUCOSE BLD-MCNC: 177 MG/DL (ref 70–99)
GLUCOSE BLD-MCNC: 179 MG/DL (ref 70–99)
GLUCOSE BLD-MCNC: 270 MG/DL (ref 70–99)
GLUCOSE BLD-MCNC: 274 MG/DL (ref 70–99)
HCT VFR BLD AUTO: 28.4 % (ref 35–48)
HGB BLD-MCNC: 8.3 G/DL (ref 12–16)
IMM GRANULOCYTES # BLD AUTO: 0.04 X10(3) UL (ref 0–1)
IMM GRANULOCYTES NFR BLD: 0.4 %
LYMPHOCYTES # BLD AUTO: 1.56 X10(3) UL (ref 1–4)
LYMPHOCYTES NFR BLD AUTO: 16.1 %
MAGNESIUM SERPL-MCNC: 2.4 MG/DL (ref 1.6–2.6)
MCH RBC QN AUTO: 27.5 PG (ref 26–34)
MCHC RBC AUTO-ENTMCNC: 29.2 G/DL (ref 31–37)
MCV RBC AUTO: 94 FL (ref 80–100)
MONOCYTES # BLD AUTO: 0.51 X10(3) UL (ref 0.1–1)
MONOCYTES NFR BLD AUTO: 5.3 %
NEUTROPHILS # BLD AUTO: 7.38 X10 (3) UL (ref 1.5–7.7)
NEUTROPHILS # BLD AUTO: 7.38 X10(3) UL (ref 1.5–7.7)
NEUTROPHILS NFR BLD AUTO: 75.9 %
OSMOLALITY SERPL CALC.SUM OF ELEC: 342 MOSM/KG (ref 275–295)
P AXIS: -9 DEGREES
P-R INTERVAL: 182 MS
PLATELET # BLD AUTO: 251 10(3)UL (ref 150–450)
POTASSIUM SERPL-SCNC: 4.2 MMOL/L (ref 3.5–5.1)
Q-T INTERVAL: 394 MS
QRS DURATION: 72 MS
QTC CALCULATION (BEZET): 428 MS
R AXIS: -33 DEGREES
RBC # BLD AUTO: 3.02 X10(6)UL (ref 3.8–5.3)
SODIUM SERPL-SCNC: 153 MMOL/L (ref 136–145)
T AXIS: 26 DEGREES
VENTRICULAR RATE: 71 BPM
WBC # BLD AUTO: 9.7 X10(3) UL (ref 4–11)

## 2025-04-30 PROCEDURE — 99223 1ST HOSP IP/OBS HIGH 75: CPT | Performed by: INTERNAL MEDICINE

## 2025-04-30 RX ORDER — HYDRALAZINE HYDROCHLORIDE 20 MG/ML
5 INJECTION INTRAMUSCULAR; INTRAVENOUS EVERY 4 HOURS PRN
Status: DISCONTINUED | OUTPATIENT
Start: 2025-04-30 | End: 2025-05-02

## 2025-04-30 RX ORDER — DEXTROSE MONOHYDRATE 50 MG/ML
INJECTION, SOLUTION INTRAVENOUS CONTINUOUS
Status: DISCONTINUED | OUTPATIENT
Start: 2025-04-30 | End: 2025-05-01

## 2025-04-30 NOTE — PHYSICAL THERAPY NOTE
PHYSICAL THERAPY EVALUATION - INPATIENT     Room Number: 524/524-A  Evaluation Date: 4/30/2025  Type of Evaluation: Initial  Physician Order: PT Eval and Treat    Presenting Problem: hypernatremia, JOSE ANTONIO  Co-Morbidities : dementia, HTN, CKD, DM  Reason for Therapy: Mobility Dysfunction and Discharge Planning    Recent admission:  4/13-4/23/25: UTI (from home and DC [facility])    PHYSICAL THERAPY ASSESSMENT   Patient is a 89 year old female admitted 4/29/2025 for hypernatremia and JOSE ANTONIO.  Prior to admission, patient's baseline is supervision.  Patient is currently functioning below baseline with bed mobility and transfers.  Patient is requiring maximum assist as a result of the following impairments: decreased functional strength, decreased endurance/aerobic capacity, impaired sitting balance, decreased muscular endurance, and cognitive deficits (disorientation, lethargy, decreased ability to follow commands).  Physical Therapy will continue to follow for duration of hospitalization.    Patient will benefit from continued skilled PT Services to promote return to prior level of function and safety with continuous assistance and gradual rehabilitative therapy .    PLAN DURING HOSPITALIZATION  Nursing Mobility Recommendation : Lift Equipment     PT Treatment Plan: Bed mobility, Endurance, Energy conservation, Patient education, Gait training, Balance training, Transfer training  Rehab Potential : Fair  Frequency (Obs): 3-5x/week     CURRENT GOALS    Goal #1 Patient is able to demonstrate supine - sit EOB @ level: moderate assistance     Goal #2 Patient is able to demonstrate transfers Sit to/from Stand at assistance level: maximum assistance     Goal #3 Patient is able to transfer to bedside chair with MAX assist     Goal #4    Goal #5    Goal #6    Goal Comments: Goals established on 4/30/2025      HOME SITUATION  Type of Home: House                        Lives With: Daughter    Drives: No          Prior Level of [place]:  Pt typically lives with daughter in 2 story home. Pt ambulatory with RW within the house. Pt uses W/C for community mobility. Pt has assist for ADL as needed. Pt with decline in functional mobility starting early April. Pt was able to Celebrate her 89th birthday in [facility] with her family.     Pt has been unable to stand at [facility]. Pt working on sitting EOB and sitting balance.     SUBJECTIVE  \"Pt nodding yes/no to 10-20% questions.\"    OBJECTIVE  Precautions: Bed/chair alarm  Fall Risk: High fall risk    WEIGHT BEARING RESTRICTION     PAIN ASSESSMENT  Rating: Unable to rate          COGNITION  Arousal/Alertness:  delayed responses to stimuli  Attention Span:  difficulty dividing attention  Following Commands:  follows one-step commands inconsistently  Initiation: hand over hand to initiate tasks  Following ~ 20% simple commands with maximal encouragement     RANGE OF MOTION AND STRENGTH ASSESSMENT  Upper extremity ROM and strength- see OT eval    Lower extremity ROM is within functional limits     Lower extremity strength is within functional limits except for the following:    Right Knee extension  2-/5  Left Knee extension  2+/5  Right Dorsiflexion  1/5  Left Dorsiflexion  2/5  Tendency to move L > R    BALANCE  Static Sitting: Fair -  Dynamic Sitting: Poor +  Static Standing: Not tested  Dynamic Standing: Not tested    ADDITIONAL TESTS                                    ACTIVITY TOLERANCE           BP: (!) 162/70        Patient Position: Sitting    O2 WALK  Oxygen Therapy  SPO2% on Oxygen at Rest: 96  At rest oxygen flow (liters per minute): 2    NEUROLOGICAL FINDINGS                        AM-PAC '6-Clicks' INPATIENT SHORT FORM - BASIC MOBILITY  How much difficulty does the patient currently have...  Patient Difficulty: Turning over in bed (including adjusting bedclothes, sheets and blankets)?: A Lot   Patient Difficulty: Sitting down on and standing up from a chair with arms (e.g., wheelchair, bedside commode,  etc.): Unable   Patient Difficulty: Moving from lying on back to sitting on the side of the bed?: A Lot   How much help from another person does the patient currently need...   Help from Another: Moving to and from a bed to a chair (including a wheelchair)?: Total   Help from Another: Need to walk in hospital room?: Total   Help from Another: Climbing 3-5 steps with a railing?: Total     AM-PAC Score:  Raw Score: 8   Approx Degree of Impairment: 86.62%   Standardized Score (AM-PAC Scale): 28.58   CMS Modifier (G-Code): CM    FUNCTIONAL ABILITY STATUS  Gait Assessment   Functional Mobility/Gait Assessment  Gait Assistance: Not tested    Skilled Therapy Provided   MAX assist x 2P for LE/trunk support with bed mobility.   MAX assist to scoot to EOB.   Demos fair to poor static sitting balance at EOB.   Occasional R lateral lean.   VC for midline orientation and intermittent assist for trunk support.   Performed light ROM with maximal cuing.   Tolerated sitting EOB for ~ 10 min.   SLP present for speech assessment.   Pt returned to supine in bed with MAX assist x 2P for LE/trunk support.   Assisted with repositioning bed.     Bed Mobility:  Rolling: NT  Supine to sit: MAX x 2P    Sit to supine: MAX x 2P     Transfer Mobility:  Sit to stand: NT   Stand to sit: NT  Gait = NT    Therapist's Comments:  Reviewed POC and activity recommendations with Pt and daughter.    Exercise/Education Provided:  Bed mobility  Energy conservation  Functional activity tolerated  Gait training  Posture  Strengthening  Transfer training    Patient End of Session: In bed, Needs met, Call light within reach, RN aware of session/findings, All patient questions and concerns addressed, Hospital anti-slip socks, Alarm set, Family present      Patient Evaluation Complexity Level:  History Moderate - 1 or 2 personal factors and/or co-morbidities   Examination of body systems Moderate - addressing a total of 3 or more elements   Clinical Presentation   Moderate - Evolving   Clinical Decision Making Moderate Complexity       PT Session Time: 25 minutes  Gait Training:  minutes  Therapeutic Activity: 15 minutes  Neuromuscular Re-education:  minutes  Therapeutic Exercise:  minutes   normal (ped)...

## 2025-04-30 NOTE — PROGRESS NOTES
NURSING ADMISSION NOTE      Patient admitted via Cart  Oriented to room.  Safety precautions initiated.  Bed in low position.  Call light in reach.

## 2025-04-30 NOTE — PLAN OF CARE
Admission navigator completed by admission RN.     [place]   Pt was recently admitted from 4/13-4/23. On 4/23 was sent to [facility].   Per ER baseline for pt is a/o x1/ nonverrbal.   Daughter is at bedside.     Modified diet: puree/nectar thick  Per paperwork wears 2 liters NC. Daughter reports that this new since previous admission.    [facility] nurse reports pt NOT having BM since 4/23. Yesterday at [facility] received multiple laxatives. ER RN reports cleaning up a BM today upon arrival.     Incontinent of bowel and bladder.       Pharm tech to complete meds.

## 2025-04-30 NOTE — PLAN OF CARE
Pt is a&ox1.  2L. NSR, SB on tele. Heparin. IV abx. IVF's. Utilizing purewick. Total lift. Strict NPO per SLP eval/QID accuchecks. Pt and dtr updated on poc. No further needs at this time.    Problem: Patient/Family Goals  Goal: Patient/Family Long Term Goal  Description: Patient's Long Term Goal: discharge  Interventions:- follow poc  - See additional Care Plan goals for specific interventions  Outcome: Progressing  Goal: Patient/Family Short Term Goal  Description: Patient's Short Term Goal:   4/29 NOC: rest, lower BP  Interventions: -   4/29 NOC: prn meds    - See additional Care Plan goals for specific interventions  Outcome: Progressing

## 2025-04-30 NOTE — ED QUICK NOTES
Orders for admission, patient is aware of plan and ready to go upstairs. Any questions, please call ED RN Terra at extension [phone].     Patient Covid vaccination status: Fully vaccinated     COVID Test Ordered in ED: None    COVID Suspicion at Admission: N/A    Running Infusions: Medication Infusions[1]     Mental Status/LOC at time of transport: A&O x1    Other pertinent information:   CIWA score: N/A   NIH score:  N/A             [1]

## 2025-04-30 NOTE — CM/SW NOTE
Patient is a 89 y/o female who admitted with Hypernatremia, CKD (chronic kidney disease) stage 5.   She was recently hospitalized 4/13-4/23 and discharged to [facility] for [facility].     Anticipated therapy need: Gradual Rehabilitative Therapy  Sent to [facility] for review.    Await clinical course for further discharge planning.     SW/CM to remain available for support and/or discharge planning.    Nanda AHN LCSW  Discharge Planner

## 2025-04-30 NOTE — SLP NOTE
ADULT SWALLOWING EVALUATION    ASSESSMENT    ASSESSMENT/OVERALL IMPRESSION:  Patient is a 89-year-old female from NH for elevated sodium levels. Patient at baseline is nonverbal. Patient's daughter at bedside reports patient eats one meal per day however does not drink a lot during the day.   Patient will sleep through meals. She has been falling asleep easily since admission.   Orders were received for a bedside swallowing evaluation as she is on modified diet of mildly thick and puree and SNF [facility]).  Unable to fully assess oral motor mechanism as she was not following commands consistently.   Assessed patient with thin liquids via spoon and puree via spoon.   Oral phase revealed poor oral acceptance and retrieval from spoon requiring constant cues to close lips around spoon. She retrieved liquids from spoon however held in oral cavity for 5-6 seconds, did not retrieve any puree from spoon. Complete clearing of oral cavity following mildly thick liquids. Pharyngeal phase revealed an apparent delay of the pharyngeal phase with weak hyolaryngeal elevation on palpation. No coughing or throat clearing.   Patient presents with impaired oral phase and apparent impaired pharyngeal phase. Impaired swallow results in decreased efficiency of swallow. Patient at high risk of aspiration due to decreased wakefulness and awareness.   Recommend dietary consult and palliative care consult to determine goals of care with family. Collaborated with RN who reported she would discuss with MD.     Pt will participate in reassessment of swallow function to r/o aspiration and determine safest/least restrictive means of nutrition/hydration.          RECOMMENDATIONS   Diet Recommendations - Solids: NPO  Diet Recommendations - Liquids: NPO    Medication Administration Recommendations: Non-oral  Treatment Plan/Recommendations: SLP to reassess    HISTORY   MEDICAL HISTORY  Reason for Referral: R/O aspiration, Altered diet  consistency    Problem List  Principal Problem:    Hypernatremia  Active Problems:    CKD (chronic kidney disease) stage 5, GFR less than 15 ml/min ([facility]ly [facility])      Past Medical History  Past Medical History[1]    Prior Living Situation: Skilled nursing facility  Diet Prior to Admission: Puree, Nectar thick liquids/ Mildly thick  Precautions: Aspiration    Patient/Family Goals: To eat and drink    SWALLOWING HISTORY  Current Diet Consistency: NPO  Dysphagia History: On a modified diet at [facility]  Imaging Results:   CONCLUSION:  Shallow inspiration.      There is cardiomegaly with pulmonary venous congestion.      Slight improved aeration retrocardiac left lung base with persistent bibasilar atelectasis    SUBJECTIVE       OBJECTIVE   ORAL MOTOR EXAMINATION  Dentition: Edentulous  Symmetry: Within Functional Limits  Strength: Unable to assess  Tone: Unable to assess  Range of Motion: Unable to assess  Rate of Motion: Unable to assess    Voice Quality:  (did not voice, mumbling only)  Respiratory Status: Unlabored  Consistencies Trialed: Nectar thick liquids, Puree  Method of Presentation: Spoon, Staff/Clinician assistance  Patient Positioned: Upright, Midline (in bed)    Oral Phase of Swallow: Impaired  Bolus Retrieval: Impaired  Bilabial Seal: Intact  Bolus Formation: Intact  Bolus Propulsion: Impaired     Retention: Intact    Pharyngeal Phase of Swallow: Appears Impaired  Laryngeal Elevation Timing: Appears impaired  Laryngeal Elevation Strength: Appears impaired  Laryngeal Elevation Coordination: Appears impaired  (Please note: Silent aspiration cannot be evaluated clinically. Videofluoroscopic Swallow Study is required to rule-out silent aspiration.)    Esophageal Phase of Swallow: No complaints consistent with possible esophageal involvement  Comments:               GOALS  Goal #1 Patient will participate in evaluation of swallow function to determine safest and least restrictive po diet.   In Progress     FOLLOW  UP  Treatment Plan/Recommendations: SLP to reassess  Duration: 2 weeks  Follow Up Needed (Documentation Required): Yes  SLP Follow-up Date: 05/01/25    Thank you for your referral.   If you have any questions, please contact JANETT Nguyen       [1]   Past Medical History:   Anemia    Arthritis    Back problem    herniated discs L4-L5    Blood disorder    anemia    Cellulitis, leg    Dementia (HCC)    Depression    Elevated blood pressure reading without diagnosis of hypertension    Esophageal reflux    Gastritis    GENITO-URINARY DISEASE    Glaucoma    HIGH BLOOD PRESSURE    HIGH CHOLESTEROL    Kidney disease    Lumbar degenerative disc disease    Lumbar radiculitis    Lumbar spondylosis    MADHU (obstructive sleep apnea)    AHI 5 RDI 5 REM AHI 0 Supine AHI 2 non-supine AHI 6     MADHU (obstructive sleep apnea)    Osteoarthrosis, unspecified whether generalized or localized, unspecified site    Other and unspecified hyperlipidemia    Pain in limb    Personal history of urinary (tract) infection    below th eknee    Renal disorder    elevated BUN and creatinine    Shortness of breath    uses oxygen 2.5 L/NC every night for hx of low saturation identified during sleep study 2014    Spinal stenosis    Type II or unspecified type diabetes mellitus without mention of complication, not stated as uncontrolled    Unspecified acute reaction to stress    Unspecified essential hypertension    Visual impairment    glasses

## 2025-04-30 NOTE — PROGRESS NOTES
[facility] Hospitalist note    PCP: Cory Sanchez DO    Chief Complaint:  F/u hypernatremia    SUBJECTIVE:  Pt briefly becomes semi-alert but not very responsive to her surroundings. Moves around a little.     OBJECTIVE:  Temp:  [96.8 °F (36 °C)-98.5 °F (36.9 °C)] 97.2 °F (36.2 °C)  Pulse:  [49-81] 69  Resp:  [16-19] 17  BP: (151-197)/(34-68) 151/34  SpO2:  [97 %-100 %] 100 %    Intake/Output:    Intake/Output Summary (Last 24 hours) at 4/30/2025 1002  Last data filed at 4/29/2025 1912  Gross per 24 hour   Intake 1000 ml   Output --   Net 1000 ml       Last 3 Weights   04/30/25 0211 120 lb 5.9 oz (54.6 kg)   04/29/25 2235 120 lb 6.4 oz (54.6 kg)   04/29/25 1749 120 lb 13 oz (54.8 kg)   04/22/25 0739 120 lb 12.8 oz (54.8 kg)   04/13/25 2256 126 lb (57.2 kg)   04/13/25 1841 126 lb (57.2 kg)   03/29/23 1434 135 lb (61.2 kg)       Exam  Gen: No acute distress  HEENT: anicteric sclera, MMM  Pulm: Lungs clear, normal respiratory effort  CV: Heart with regular rate and rhythm, no peripheral edema  Abd: Abdomen soft, nontender, nondistended, no organomegaly, bowel sounds present  MSK: Full range of motion in extremities, no clubbing, no cyanosis  Skin: no rashes or lesions  Neuro: A&OX3, no focal deficits    Data Review:         Labs:     Recent Labs   Lab 04/29/25  1758 04/30/25  0634   WBC 9.1 9.7   HGB 8.9* 8.3*   MCV 92.2 94.0   .0 251.0   NE 6.84 7.38   LYMABS 1.63 1.56       Recent Labs   Lab 04/29/25  1758 04/30/25  0657   * 153*   K 4.4 4.2   * 117*   CO2 27.0 23.0   BUN 82* 72*   CREATSERUM 4.23* 4.14*   CA 9.8 9.6   MG  --  2.4   * 179*       Recent Labs   Lab 04/29/25  1758   ALT 16   AST 35*   ALB 3.9       No results for input(s): \"TROP\", \"CK\", \"PBNP\", \"PCT\" in the last 168 hours.    No results for input(s): \"CRP\", \"SHEBA\", \"LDH\", \"DDIMER\" in the last 168 hours.    Recent Labs   Lab 04/23/25  1151 04/30/25  0148 04/30/25  0528   PGLU 336* 176* 171*       Meds:   Scheduled  Medication:Scheduled Medications[1]  Continuous Infusing Medication:Medication Infusions[2]  PRN Medication:PRN Medications[3]       Microbiology:    No results found for this visit on 04/29/25.    Lab Results   Component Value Date    COVID19 Not Detected 04/29/2025    COVID19 NOT DETECTED 01/16/2022    COVID19 NOT DETECTED 11/22/2021        Assessment/Plan:     Patient is a89 year old female with PMH HTN, CKD, DM, HL who presents from [facility] for elevated Sodium levels.       Hyponatremia  JOSE ANTONIO on CKD4 in setting of long-standing HTN, diabetic nephropathy and advanced age. Baseline Cr 2.4-2.9  - Sodium 150, creatinine 4.23 on admission. Na slightly up to 153 today. Cr slightly improved  - Mental status- per daughter, still off of baseline although recently she seems to have been declining.  - Nephrology on consult, D5W ivf     History of recurrent UTI  -Recent E. Coli  -UA positive  -Will follow-up urine culture  -Ceftriaxone started     HTN  - prn hydralazine  - metop      Type 2 diabetes mellitus, controlled A1c 6.0   - Hyperglycemic protocol with accu-checks QID and low-dose sliding scale insulin  - Will hold PO meds     Constipation  - bowel regimen     GOC  - I had lengthy conversation with pt's daughter this morning at bedside. She informed me that she has spoken with pt's PCP, Dr. Sanchez, and staff at [facility] that while the patient has done well to 89 years of age and even traveled to [facility] 2/25, there was an anticipation that things could be starting to change. She states that after returning from [facility] and then a fall and the last hospital stay resulting in transition to [facility], the pt's mentation has been variable. She has not been eating much in particular right before admission. She has been saying things that are unusual for her increasingly, calling out paull leydi repeatedly. Her hope is that pt can have some improvement with hydration although not much improvement has been seen yet. Yet she  acknowledges that at her mother's age, if she is not improving, they will want to make her comfortable, and that she has lived a good life. Thus far speech therapy has recommended npo due to aspiration risk too.  - We discussed code status in this context. We talked about cpr and what it entails. She states that she had spoken with the staff at [facility] on this as well. She informs me that she had filled out a DNR on the POLST form there (CM contacted the facility who told me she had not completed that there yet). Regardless, I did discuss this in detail with pt that should there be any cardiopulmonary arrest here, she would not want cpr or similar invasive measures.  - We did not discuss nutrition if she does not progress with her swallowing. Could consider palliative consultation again this hospital stay as well.  - ACP planning from [id number] discussing above     FN:  - IVF: Per nephrology  - Diet: Diabetic     DVT Prophy: SCDs HSQ  Atrophy: Ambulate PT/OT   Lines: Piv     Dispo: pending clinical course        Vianey Chapman MD  [facility] Hospitalist  Pager: [phone]         [1]    insulin aspart  1-10 Units Subcutaneous TID AC and HS    heparin  5,000 Units Subcutaneous Q8H VEENA    multivitamin  1 tablet Oral Daily    metoprolol succinate ER  25 mg Oral Daily Beta Blocker    senna-docusate  1 tablet Oral BID    cefTRIAXone  1 g Intravenous Q24H   [2]    dextrose 125 mL/hr at 04/30/25 0845   [3]   hydrALAzine    glucose **OR** glucose **OR** glucose-vitamin C **OR** dextrose **OR** glucose **OR** glucose **OR** glucose-vitamin C    acetaminophen    polyethylene glycol (PEG 3350)    sennosides    ondansetron    metoclopramide    bisacodyl    simethicone

## 2025-04-30 NOTE — OCCUPATIONAL THERAPY NOTE
OCCUPATIONAL THERAPY EVALUATION - INPATIENT     Room Number: 524/524-A  Evaluation Date: 4/30/2025  Type of Evaluation: Initial  Presenting Problem: Hypernatremia    Physician Order: IP Consult to Occupational Therapy  Reason for Therapy: ADL/IADL Dysfunction and Discharge Planning    OCCUPATIONAL THERAPY ASSESSMENT   Patient is currently functioning below baseline with toileting, bathing, upper body dressing, lower body dressing, grooming, bed mobility, and transfers. Prior to admission, patient's baseline is sup to Gem with walker up until recent admission mid April 2025.  Patient is requiring maximum assistance as a result of the following impairments: decreased functional strength, decreased functional reach, decreased endurance, pain, decreased muscular endurance, cognitive deficits (EF), and medical status. Occupational Therapy will continue to follow for duration of hospitalization.    Patient will benefit from continued skilled OT Services to promote return to prior level of function and safety with continuous assistance and gradual rehabilitative therapy    History Related to Current Admission: Patient is a 89 year old female admitted on 4/29/2025 with Presenting Problem: Hypernatremia. Co-Morbidities : dementia, HTN, CKD, DM    WEIGHT BEARING RESTRICTION       Recommendations for nursing staff:   Transfers: kan lift  Toileting location: bed level    EVALUATION SESSION:  Patient Start of Session: bed    FUNCTIONAL TRANSFER ASSESSMENT     BED MOBILITY  Supine to Sit : Dependent  Sit to Supine (OT): Dependent    BALANCE ASSESSMENT  Static Sitting: Contact Guard Assist    FUNCTIONAL ADL ASSESSMENT  Grooming Seated: Maximum Assist  LB Dressing Seated: Dependent    ACTIVITY TOLERANCE: poor                         O2 SATURATIONS       COGNITION  Overall Cognitive Status:  Impaired  Arousal/Alertness:  inconsistent responses to stimuli  Attention Span:  difficulty attending to directions and difficulty  dividing attention    Upper Extremity   ROM: within functional limits   Strength: grossly 3/5 bilat UE  Coordination  Gross motor:   Fine motor:   Sensation:     EDUCATION PROVIDED  Patient Education : Role of Occupational Therapy; Discharge Recommendations; DME Recommendations; Proper Body Mechanics  Patient's Response to Education: Does Not Demonstrate Skills Needed for Learning; Demonstrates Poor Carry Over to Information    Equipment used: none  Demonstrates functional use, Would benefit from additional trial      Therapist comments: Pt does not respond to questions from therapists, follows ~25% of commands throughout session when cued. Dependent assist for bed mobility but able to hold self at EOB with CGA to Gem. Eyes mostly closed throughout session, returned to supine in bed.    Patient End of Session: In bed, Needs met, Call light within reach, Hospital anti-slip socks, All patient questions and concerns addressed, Alarm set, Family present    OCCUPATIONAL PROFILE    HOME SITUATION  Type of Home: House     Lives With: Daughter                     Drives: No       Prior Level of Function: per daughter, patient requires supervision to min A w/ use of walker in the home.  Pt has been at [facility] since admission mid April 2025, therapy has only worked on dangling EOB.  SUBJECTIVE   Lethargic    PAIN ASSESSMENT  Rating: Unable to rate          OBJECTIVE  Precautions: Bed/chair alarm  Fall Risk: High fall risk    ASSESSMENTS    AM-PAC ‘6-Clicks’ Inpatient Daily Activity Short Form  -   Putting on and taking off regular lower body clothing?: Total  -   Bathing (including washing, rinsing, drying)?: Total  -   Toileting, which includes using toilet, bedpan or urinal? : Total  -   Putting on and taking off regular upper body clothing?: Total  -   Taking care of personal grooming such as brushing teeth?: Total  -   Eating meals?: A Lot    AM-PAC Score:  Score: 7  Approx Degree of Impairment: 92.44%  Standardized Score  (AM-PAC Scale): 20.13    ADDITIONAL TESTS     NEUROLOGICAL FINDINGS      COGNITION ASSESSMENTS     PLAN     OT Treatment Plan: Balance activities, Energy conservation/work simplification techniques, ADL training, Functional transfer training, Patient/Family education, Endurance training, Patient/Family training, Compensatory technique education  Rehab Potential : Guarded  Frequency: 3-5x/week  Number of Visits to Meet Established Goals: 5    ADL Goals   Patient will perform eating: with min assist  Patient will perform grooming: with min assist    Functional Transfer Goals  Patient will transfer from sit to supine:  with mod assist  Patient will transfer from supine to sit:  with mod assist    Additional Goals  Pt will follow 80% of commands to better participate in ADL routine    Therapist Goals    Patient Evaluation Complexity Level:   Occupational Profile/Medical History LOW - Brief history including review of medical or therapy records    Specific performance deficits impacting engagement in ADL/IADL LOW  1 - 3 performance deficits    Client Assessment/Performance Deficits LOW - No comorbidities nor modifications of tasks    Clinical Decision Making LOW - Analysis of occupational profile, problem-focused assessments, limited treatment options    Overall Complexity LOW     OT Session Time: 20 minutes  Self-Care Home Management:  minutes  Therapeutic Activity: 10 minutes  Neuromuscular Re-education:  minutes  Therapeutic Exercise:  minutes  Cognitive Skills:  minutes  Sensory Integrative:  minutes  Orthotic Management and Training:  minutes  Can add/delete any of these

## 2025-04-30 NOTE — DIETARY MALNUTRITION NOTE
[facility]   part of [facility]  NUTRITION ASSESSMENT    Pt meets severe malnutrition criteria at this time.    CRITERIA FOR MALNUTRITION DIAGNOSIS:  Criteria for severe malnutrition diagnosis: acute illness/injury related to wt loss greater than 5% in 1 month and energy intake less than 50% for greater than 5 days    NUTRITION INTERVENTION:    RD nutrition Care Plan- Recommend EN (enteral nutrition) support if unable to take adequate PO safely within 1-2 days  Meal and Snacks - ADAT per SLP recommendation; monitor patient po intake. Encourage adequate po of appropriate diet.  Medical Food Supplements - RD added Ensure Plus HP BID once diet advanced. Rationale/use for oral supplements discussed.  Enteral Nutrition - If pt remains NPO and consistent with GOC, recommend placing feeding tube and initiating TF.  Vitamin and Mineral Supplements - Recommend adding Multivitamin with minerals once diet advanced.  Coordination of Nutrition Care - Recommend SLP consult prior to diet advancement. and Palliative care consult for goal of care    PATIENT STATUS: 89 year old female admitted on 4/29 presents with hypernatremia. Pt is AAOx1 at baseline and nonverbal. Noted pt recently here and last assessed by RD 4/22. Pt with poor appetite/PO intake during previous admit which likely continued at SNF given hypernatremic upon admit. SLP evaluated today and recommends NPO d/t not following commands consistently (she was on pureed diet with nectar thick liquids at SNF). No GI symptoms noted with last BM 4/29. NKFA. Pt ordered Ensure Plus HP during last admit - will reorder once diet advanced. If pt remains NPO, recommend palliative care consult to address GOC as pt may require feeding tube. Will continue to monitor and follow up as appropriate.    PMH: Anemia, Dementia, Depression, Esophageal reflux, MADHU, hyperlipidemia, Type II DM, essential hypertension    ANTHROPOMETRICS:  Ht:  4'9\"  Wt: 54.6 kg (120 lb 5.9 oz).   BMI:  Body mass index is 26.05 kg/m².  IBW: 42 kg    WEIGHT HISTORY: Per chart, pt with ~7 lb wt loss x 1 month (5.5%, significant per standards).  Patient Weight(s) for the past 336 hrs:   Weight   04/30/25 0211 54.6 kg (120 lb 5.9 oz)   04/29/25 2235 54.6 kg (120 lb 6.4 oz)   04/29/25 1749 54.8 kg (120 lb 13 oz)       Wt Readings from Last 5 Encounters:   04/30/25 54.6 kg (120 lb 5.9 oz)   04/22/25 54.8 kg (120 lb 12.8 oz)   03/29/23 61.2 kg (135 lb)   07/05/22 61.3 kg (135 lb 2 oz)   11/03/21 59.9 kg (132 lb)   Per Care Everywhere:  03/25/25 58 kg (127 lb)   12/30/24 59 kg (130 lb)  04/18/24 59.4 kg (131 lb)    NUTRITION:  Diet:       Procedures    NPO      Food Allergies: No  Cultural/Ethnic/Alevism Preferences Addressed: Yes    Percent Meals Eaten (last 3 days)       None            GI SYSTEM REVIEW: WNL; last BM 4/29  Skin/Wounds: sacral wound    NUTRITION RELATED PHYSICAL FINDINGS:     1. Body Fat/Muscle Mass: unable to assess     2. Fluid Accumulation: none per RN documentation    NUTRITION PRESCRIPTION:  54.6 kg Actual Body Weight  Calories: 8572-6106 calories/day (25-30 kcal/kg)  Protein: 55-82 grams protein/day (1.0-1.5 gm/kg)  Fluid: ~1 ml/kcal or per MD discretion    NUTRITION DIAGNOSIS/PROBLEM:  Malnutrition related to insufficient appetite resulting in inadequate nutrition intake and inability to take or tolerate as evidenced by documented/reported insufficient oral intake and documented/reported unintentional weight loss    MONITOR AND EVALUATE/NUTRITION GOALS:  Weight stable within 1 to 2 lbs during admission - New  Start alternative nutrition in 24-48 hrs if diet is not able to advance- New      MEDICATIONS:  Abx, novolog, dialyvite, senokot  Gtt: D5W at 125 ml/hr    LABS:  , Na 153, POC glucose: 171-270 mg/dl, A1c = 6%    Pt is at High nutrition risk    Clarice Frost RD, LDN, [facility]  Clinical Dietitian  Spectra: [phone]

## 2025-04-30 NOTE — PLAN OF CARE
Pt is  A&O x 1, nonverbal. BP elevated, prn meds given per MAR. Other VSS, afebrile. SPO2 maintained on 2L NC. NSR/SB on tele. Heparin. Last BM 04/29/25. NPO, failed RN dysphagia screening. Pending speech eval. Incontinent x2. Total lift. No pain. PIV IVF. Sacrum wound, Mepilex placed, C/D/I. Patient updated on poc, no further needs at this time. Safety precautions in place.     Problem: Patient/Family Goals  Goal: Patient/Family Long Term Goal  Description: Patient's Long Term Goal: discharge  Interventions:- follow poc  - See additional Care Plan goals for specific interventions  Outcome: Progressing  Goal: Patient/Family Short Term Goal  Description: Patient's Short Term Goal:   4/29 NOC: rest, lower BP  Interventions: -   4/29 NOC: prn meds    - See additional Care Plan goals for specific interventions  Outcome: Progressing

## 2025-04-30 NOTE — H&P
[facility] Hospitalist H&P       CC:   Chief Complaint   Patient presents with    Abnormal Result     Abnormal labs, hypernatremia 157 elevated bun/creatinine. Alert x1 at baseline         PCP: Cory Sanchez DO    History of Present Illness: Patient is a89 year old female with PMH HTN, CKD, DM, HL who presents from [facility] for elevated Sodium levels.  Patient is A&Ox1 at BL and nonverbal.  Not much ROS obtained from patient.  Discussed with ER physician and daughter at bedside.  Patient was just admitted last week for elevated sodium levels.  Since discharge to SNF patient has been trying to increase her fluid intake.  Daughter states that patient is around her baseline mental status.  She does say that sometimes she will hear her mumbling and bring to herself more so since her last hospital admission.  At the nursing home patient is able to stand and take a few steps to the bathroom.    In the ED patient is hypertensive, afebrile on room air.  WBC 9.1, hemoglobin 8.9, glucose 291  Sodium 150, creatinine 4.23  UA positive leukocyte esterase and WBC, limited RVP negative  Chest x-ray cardiomegaly with pulmonary venous congestion     PMH  Past Medical History[1]     PSH  Past Surgical History[2]     ALL:  Allergies[3]     Home Medications:  Medications Taking[4]      Soc Hx  Social History     Tobacco Use    Smoking status: Never    Smokeless tobacco: Never   Substance Use Topics    Alcohol use: Yes     Comment: rarely        Fam Hx  Family History[5]    Review of Systems  Comprehensive ROS reviewed and negative except for what's stated above.     OBJECTIVE:  BP (!) 161/68   Pulse 57   Temp 97 °F (36.1 °C) (Temporal)   Resp 19   Wt 120 lb 13 oz (54.8 kg)   LMP  (LMP Unknown)   SpO2 97%   BMI 26.14 kg/m²   General: Alert, shivering  HEENT: oral mucosa dry  Lungs: clear to ausculation bilaterally  Heart: Regular rate and rhythm  Abdomen: soft, non tender, non distended   Extremities: No edema  Skin: no new  rash, normal color  Neuro: Does not follow commands  Psych: appropriate affect   Diagnostic Data:    CBC/Chem  Recent Labs   Lab 04/29/25  1758   WBC 9.1   HGB 8.9*   MCV 92.2   .0       Recent Labs   Lab 04/23/25  0843 04/29/25  1758    150*   K 4.2 4.4    113*   CO2 23.0 27.0   BUN 89* 82*   CREATSERUM 4.50* 4.23*   * 291*   CA 9.4 9.8   MG 2.4  --        Recent Labs   Lab 04/29/25  1758   ALT 16   AST 35*   ALB 3.9       No results for input(s): \"TROP\" in the last 168 hours.    Additional Diagnostics: ECG: Sinus rhythm with PVC    CXR: image personally reviewed     Radiology: XR CHEST AP PORTABLE  (CPT=71045)  Result Date: 4/29/2025  CONCLUSION:  Shallow inspiration.  There is cardiomegaly with pulmonary venous congestion.  Slight improved aeration retrocardiac left lung base with persistent bibasilar atelectasis.   LOCATION:  [id number]      Dictated by (CST): Brenda Shrestha MD on 4/29/2025 at 6:27 PM     Finalized by (CST): Brenda Shrestha MD on 4/29/2025 at 6:27 PM           ASSESSMENT / PLAN:   Patient is a89 year old female with PMH HTN, CKD, DM, HL who presents from [facility] for elevated Sodium levels.      Hyponatremia  JOSE ANTONIO on CKD4 in setting of long-standing HTN, diabetic nephropathy and advanced age  - Sodium 150, creatinine 4.23 on admission   - Mental status at baseline  - Nephrology on consult  - 1L 0.9 bolus given in ER     History of recurrent UTI  -Recent E. Coli  -UA positive  -Will follow-up urine culture  -Ceftriaxone started     HTN  - prn hydralazine  - metop      Type 2 diabetes mellitus, controlled A1c 6.0   - Hyperglycemic protocol with accu-checks QID and low-dose sliding scale insulin  - Will hold PO meds    Constipation  - bowel regimen     GOC  - Full code confirmed with daughter at bedside  - Has seen palliative in the past    FN:  - IVF: Per nephrology  - Diet: Diabetic    DVT Prophy: SCDs HSQ  Atrophy: Ambulate PT/OT   Lines: Piv    Dispo: pending clinical  course    Outpatient records or previous hospital records reviewed.     Further recommendations pending patient's clinical course.  [facility] hospitalist to continue to follow patient while in house    Patient and/or patient's family given opportunity to ask questions and note understanding and agreeing with therapeutic plan as outlined    Thank You,  Manuel Chan MD    [facility] Hospitalist  Answering Service number: [phone]         [1]   Past Medical History:   Anemia    Arthritis    Back problem    herniated discs L4-L5    Blood disorder    anemia    Cellulitis, leg    Dementia (HCC)    Depression    Elevated blood pressure reading without diagnosis of hypertension    Esophageal reflux    Gastritis    GENITO-URINARY DISEASE    Glaucoma    HIGH BLOOD PRESSURE    HIGH CHOLESTEROL    Kidney disease    Lumbar degenerative disc disease    Lumbar radiculitis    Lumbar spondylosis    MADHU (obstructive sleep apnea)    AHI 5 RDI 5 REM AHI 0 Supine AHI 2 non-supine AHI 6     MADHU (obstructive sleep apnea)    Osteoarthrosis, unspecified whether generalized or localized, unspecified site    Other and unspecified hyperlipidemia    Pain in limb    Personal history of urinary (tract) infection    below th eknee    Renal disorder    elevated BUN and creatinine    Shortness of breath    uses oxygen 2.5 L/NC every night for hx of low saturation identified during sleep study 2014    Spinal stenosis    Type II or unspecified type diabetes mellitus without mention of complication, not stated as uncontrolled    Unspecified acute reaction to stress    Unspecified essential hypertension    Visual impairment    glasses   [2]   Past Surgical History:  Procedure Laterality Date    Angiogram  FEB 2013    Angiogram  Feb 2013    Done in [place]    Appendectomy  1949    Appendectomy      Cataract surgery, complex  08/2010    Cath percutaneous  transluminal coronary angioplasty  2/2012    Normal in [facility]    Colonoscopy N/A 11/15/2016     Procedure: COLONOSCOPY;  Surgeon: Levar Callaway MD;  Location:  ENDOSCOPY    Colonoscopy & polypectomy  11/15/16= Polyp (serrated)    Repeat PRN    Fluor gid & loclzj ndl/cath spi dx/ther njx  8/30/2013    Procedure: LUMBAR EPIDURAL;  Surgeon: Srini Benjamin MD;  Location: [facility]    Fluor gid & loclzj ndl/cath spi dx/ther njx  9/16/2013    Procedure: LUMBAR EPIDURAL;  Surgeon: Srini Benjamin MD;  Location: [facility]    Fluor gid & loclzj ndl/cath spi dx/ther njx  10/8/2013    Procedure: LUMBAR EPIDURAL;  Surgeon: Srini Benjamin MD;  Location: [facility]    Fluor gid & loclzj ndl/cath spi dx/ther njx  6/24/2014    Procedure: ;  Surgeon: Srini Benjamin MD;  Location: [facility]    Fluor gid & loclzj ndl/cath spi dx/ther njx N/A 7/24/2014    Procedure: LUMBAR EPIDURAL;  Surgeon: Srini Benjamin MD;  Location: [facility]    Hysterectomy      Hysterectomy  1974    Injection, w/wo contrast, dx/therapeutic substance, epidural/subarachnoid; lumbar/sacral  8/30/2013    Procedure: LUMBAR EPIDURAL;  Surgeon: Srini Benjamin MD;  Location: [facility]    Injection, w/wo contrast, dx/therapeutic substance, epidural/subarachnoid; lumbar/sacral  9/16/2013    Procedure: LUMBAR EPIDURAL;  Surgeon: Srini Benjamin MD;  Location: [facility]    Injection, w/wo contrast, dx/therapeutic substance, epidural/subarachnoid; lumbar/sacral  10/8/2013    Procedure: LUMBAR EPIDURAL;  Surgeon: Srini Benjamin MD;  Location: [facility]    Injection, w/wo contrast, dx/therapeutic substance, epidural/subarachnoid; lumbar/sacral N/A 6/24/2014    Procedure: LUMBAR EPIDURAL;  Surgeon: Srini Benjamin MD;  Location: [facility]    Injection, w/wo contrast, dx/therapeutic substance, epidural/subarachnoid; lumbar/sacral N/A 7/24/2014    Procedure: LUMBAR EPIDURAL;   Surgeon: Srini Benjamin MD;  [facility]: [facility]    M-sedaj by  phys perfrmg svc 5+ yr  8/30/2013    Procedure: LUMBAR EPIDURAL;  Surgeon: Srini Benjamin MD;  [facility]: [facility]    M-sedaj by  phys perfrmg svc 5+ yr  9/16/2013    Procedure: LUMBAR EPIDURAL;  Surgeon: Srini Benjamin MD;  [facility]: [facility]    M-sedaj by  phys perfrmg svc 5+ yr  10/8/2013    Procedure: LUMBAR EPIDURAL;  Surgeon: Srini Benjamin MD;  [facility]: [facility]    M-sedaj by  phys perfrmg svc 5+ yr  6/24/2014    Procedure: ;  Surgeon: Srini Benjamin MD;  Location: [facility]    M-sedaj by  phys perfrmg svc 5+ yr N/A 7/24/2014    Procedure: LUMBAR EPIDURAL;  Surgeon: Srini Benjamin MD;  [facility]    Patient documented not to have experienced any of the following events  8/30/2013    Procedure: LUMBAR EPIDURAL;  Surgeon: Srini Benjamin MD;  [facility]    Patient documented not to have experienced any of the following events  9/16/2013    Procedure: LUMBAR EPIDURAL;  Surgeon: Srini Benjamin MD;  [facility]    Patient documented not to have experienced any of the following events  10/8/2013    Procedure: LUMBAR EPIDURAL;  Surgeon: Srini Benjamin MD;  [facility]    Patient documented not to have experienced any of the following events  6/24/2014    Procedure: ;  Surgeon: Srini Benjamin MD;  [facility]    Patient documented not to have experienced any of the following events N/A 7/24/2014    Procedure: LUMBAR EPIDURAL;  Surgeon: Srini Benjamin MD;  Location: [facility]    Patient withough preoperative order for iv antibiotic surgical site infection prophylaxis.  8/30/2013    Procedure: LUMBAR EPIDURAL;  Surgeon: Srini Benjamin MD;  Location: [facility]    Patient withough preoperative order for iv antibiotic surgical site infection prophylaxis.  9/16/2013    Procedure: LUMBAR EPIDURAL;  Surgeon: Srini Benjamin MD;  Location: [facility]    Patient withough preoperative order for iv antibiotic surgical site infection prophylaxis.  10/8/2013    Procedure: LUMBAR EPIDURAL;  Surgeon: Srini Benjamin MD;  Location: [facility]    Patient withough preoperative order for iv antibiotic surgical site infection prophylaxis.  6/24/2014    Procedure: ;  Surgeon: Srini Benjamin MD;  Location: [facility]    Patient withough preoperative order for iv antibiotic surgical site infection prophylaxis. N/A 7/24/2014    Procedure: LUMBAR EPIDURAL;  Surgeon: Srini Benjamin MD;  Location: [facility]    Upper gi endoscopy performed  10/7/13   [3]   Allergies  Allergen Reactions    Bee Venom SWELLING   [4]   No outpatient medications have been marked as taking for the 4/29/25 encounter (Hospital Encounter).   [5]   Family History  Problem Relation Age of Onset    Cancer Father         Esophagus (1964)    Diabetes Mother     Hypertension Sister     Obesity Sister     Other (gastric cancer) Sister     Other (malignant pancreatic neoplasm) Sister     Cancer Sister         Colon (2009)    Cancer Sister         Pancreas (2001)

## 2025-05-01 LAB
ANION GAP SERPL CALC-SCNC: 11 MMOL/L (ref 0–18)
BASOPHILS # BLD AUTO: 0.05 X10(3) UL (ref 0–0.2)
BASOPHILS NFR BLD AUTO: 0.6 %
BUN BLD-MCNC: 60 MG/DL (ref 9–23)
CALCIUM BLD-MCNC: 9.1 MG/DL (ref 8.7–10.6)
CHLORIDE SERPL-SCNC: 113 MMOL/L (ref 98–112)
CO2 SERPL-SCNC: 25 MMOL/L (ref 21–32)
CREAT BLD-MCNC: 3.85 MG/DL (ref 0.55–1.02)
EGFRCR SERPLBLD CKD-EPI 2021: 10 ML/MIN/1.73M2 (ref 60–?)
EOSINOPHIL # BLD AUTO: 0.22 X10(3) UL (ref 0–0.7)
EOSINOPHIL NFR BLD AUTO: 2.6 %
ERYTHROCYTE [DISTWIDTH] IN BLOOD BY AUTOMATED COUNT: 14.3 %
GLUCOSE BLD-MCNC: 132 MG/DL (ref 70–99)
GLUCOSE BLD-MCNC: 153 MG/DL (ref 70–99)
GLUCOSE BLD-MCNC: 167 MG/DL (ref 70–99)
GLUCOSE BLD-MCNC: 175 MG/DL (ref 70–99)
GLUCOSE BLD-MCNC: 243 MG/DL (ref 70–99)
HCT VFR BLD AUTO: 28.6 % (ref 35–48)
HGB BLD-MCNC: 8.4 G/DL (ref 12–16)
IMM GRANULOCYTES # BLD AUTO: 0.03 X10(3) UL (ref 0–1)
IMM GRANULOCYTES NFR BLD: 0.4 %
LYMPHOCYTES # BLD AUTO: 1.28 X10(3) UL (ref 1–4)
LYMPHOCYTES NFR BLD AUTO: 15.3 %
MAGNESIUM SERPL-MCNC: 2.3 MG/DL (ref 1.6–2.6)
MCH RBC QN AUTO: 27.9 PG (ref 26–34)
MCHC RBC AUTO-ENTMCNC: 29.4 G/DL (ref 31–37)
MCV RBC AUTO: 95 FL (ref 80–100)
MONOCYTES # BLD AUTO: 0.39 X10(3) UL (ref 0.1–1)
MONOCYTES NFR BLD AUTO: 4.7 %
NEUTROPHILS # BLD AUTO: 6.39 X10 (3) UL (ref 1.5–7.7)
NEUTROPHILS # BLD AUTO: 6.39 X10(3) UL (ref 1.5–7.7)
NEUTROPHILS NFR BLD AUTO: 76.4 %
OSMOLALITY SERPL CALC.SUM OF ELEC: 328 MOSM/KG (ref 275–295)
PLATELET # BLD AUTO: 247 10(3)UL (ref 150–450)
POTASSIUM SERPL-SCNC: 4 MMOL/L (ref 3.5–5.1)
RBC # BLD AUTO: 3.01 X10(6)UL (ref 3.8–5.3)
SODIUM SERPL-SCNC: 149 MMOL/L (ref 136–145)
WBC # BLD AUTO: 8.4 X10(3) UL (ref 4–11)

## 2025-05-01 PROCEDURE — 99233 SBSQ HOSP IP/OBS HIGH 50: CPT | Performed by: INTERNAL MEDICINE

## 2025-05-01 PROCEDURE — 99221 1ST HOSP IP/OBS SF/LOW 40: CPT | Performed by: NURSE PRACTITIONER

## 2025-05-01 RX ORDER — SODIUM CHLORIDE 450 MG/100ML
INJECTION, SOLUTION INTRAVENOUS CONTINUOUS
Status: DISCONTINUED | OUTPATIENT
Start: 2025-05-01 | End: 2025-05-02

## 2025-05-01 NOTE — CM/SW NOTE
Order for Hospice consult acknowledged. Referral sent to [facility]. Agency called and VM left for follow up. Referral with order sent to agency via [facility].     CM/SW will remain available for DC planning and/or support.     JOE Napoles, CMSRN    [id number]

## 2025-05-01 NOTE — PROGRESS NOTES
CC: follow-up hospital admission hypernatremia    SUBJECTIVE:  Interval History:     Overnight pulled on ivs / lines  Not cooperative much today with swallow study   Dw daughter at   Family meething with hospice today    OBJECTIVE:  Scheduled Meds: Scheduled Medications[1]  Continuous Infusions: Medication Infusions[2]  PRN Meds: PRN Medications[3]    PHYSICAL EXAM  Vital signs: Temp:  [97 °F (36.1 °C)-98.2 °F (36.8 °C)] 98 °F (36.7 °C)  Pulse:  [53-61] 53  Resp:  [16-18] 16  BP: (119-173)/(42-84) 146/42  SpO2:  [97 %-100 %] 100 %      GENERAL - NAD   Breathing non labored  RRR  No edema  No facial asymmetry, moving ext.        Data Review:   Labs:   Recent Labs   Lab 04/29/25 1758 04/30/25  0634 05/01/25  0653   WBC 9.1 9.7 8.4   HGB 8.9* 8.3* 8.4*   MCV 92.2 94.0 95.0   .0 251.0 247.0       Recent Labs   Lab 04/29/25 1758 04/30/25  0657 05/01/25  0653   * 153* 149*   K 4.4 4.2 4.0   * 117* 113*   CO2 27.0 23.0 25.0   BUN 82* 72* 60*   CREATSERUM 4.23* 4.14* 3.85*   CA 9.8 9.6 9.1   MG  --  2.4 2.3   * 179* 153*       Recent Labs   Lab 04/29/25 1758   ALT 16   AST 35*   ALB 3.9       Recent Labs   Lab 04/30/25  0528 04/30/25  1140 04/30/25  1653 04/30/25  2000 05/01/25  0457   PGLU 171* 270* 274* 177* 175*           ASSESSMENT/PLAN:  Patient is a89 year old female with PMH HTN, CKD, DM, HL who presents from [facility] for elevated Sodium levels.       Hypernatremia  JOSE ANTONIO on CKD4 in setting of long-standing HTN, diabetic nephropathy and advanced age. Baseline Cr 2.4-2.9  - Sodium 150, creatinine 4.23 on admission. Imrpoving with IVF  - Mental status- per daughter, still off of baseline although recently she seems to have been declining.  - Nephrology on consult, D5W ivf     History of recurrent UTI  -Recent E. Coli  -UA positive however cx negative    Will stop abx     HTN  - prn hydralazine  - metop      Type 2 diabetes mellitus, controlled A1c 6.0   - Hyperglycemic protocol with  accu-checks QID and low-dose sliding scale insulin  - Will hold PO meds     Constipation  - bowel regimen    Dw palliative team       Will continue to follow while hospitalized. Please page me or the on-call hospitalist with questions or concerns.    Trevor Paez Hospitalist  [phone]  Answering Service: [phone]         [1]    insulin aspart  1-10 Units Subcutaneous TID AC and HS    heparin  5,000 Units Subcutaneous Q8H VEENA    multivitamin  1 tablet Oral Daily    metoprolol succinate ER  25 mg Oral Daily Beta Blocker    senna-docusate  1 tablet Oral BID    cefTRIAXone  1 g Intravenous Q24H   [2]    sodium chloride     [3]   hydrALAzine    glucose **OR** glucose **OR** glucose-vitamin C **OR** dextrose **OR** glucose **OR** glucose **OR** glucose-vitamin C    acetaminophen    polyethylene glycol (PEG 3350)    sennosides    ondansetron    metoclopramide    bisacodyl    simethicone

## 2025-05-01 NOTE — CM/SW NOTE
[facility] updated [facility] referral to Nursing facilities.  Per hospital CM the PASSR would have been completed upon last admission and is still active.    Karen Granger LCSW  [facility]  [phone]    ADDENDUM:  SW reserved [facility] for LTC.  Family was notified about private pay.

## 2025-05-01 NOTE — PLAN OF CARE
5/1 alert, non verbal, confused, pulled out iv site this am, re inserted. Kept npo . SLP tried swallow eval again. Not swallowing well. SLP spoke to daughter at bedside. May do pleasure feeding with risks of aspiration., not sufficient for nutritional purposes, seen by md. Spoke to daughter and 2 sons at bedside. Palliative consulted. Seen by palliative, consulted hospice. Kimberly from hospice here, family to decide tomorrow for hospice.  Ivf infusing well. Oxygen at 7L/nc, sat=90-93%, family at bedside. Call light within reach.   Problem: Patient/Family Goals  Goal: Patient/Family Long Term Goal  Description: Patient's Long Term Goal: discharge  Interventions:- follow poc  - See additional Care Plan goals for specific interventions  Outcome: Progressing  Goal: Patient/Family Short Term Goal  Description: Patient's Short Term Goal:   4/29 NOC: rest, lower BP  Interventions: -   4/29 NOC: prn meds    - See additional Care Plan goals for specific interventions  Outcome: Progressing

## 2025-05-01 NOTE — CONGREGATE LIVING REVIEW
[facility] Living Authorization    The [facility] Review Committee has reviewed this case and the patient IS APPROVED for discharge to a facility for Short Term Skilled once the following procedure is followed:     - The physician discharge instructions (contained within the ALISSA note for SNF) must inlcude the below appropriate and approved COVID instructions to the facility    For questions regarding CLRC approval process, please contact the CM assigned to the case.  For questions regarding RN discharge workflow, please contact the unit Clinical Leader.

## 2025-05-01 NOTE — HOSPICE RN NOTE
[facility] nursing visit for follow up on hospice consult order. Met with daughter HÉCTOR Venegas and son Ravindra. Discussed hospice benefit, hospice philosophy, palliative care with questions and concerns addressed. Hospice informational book was provided. Family leaning toward hospice. Not ready to make hospice decision at this time. They was to talk with their other sibling tonight before making a decision on hospice. Family struggling with patient's rapid decline. Hospice will follow up with family again tomorrow. Please call [facility] with any questions or concerns.    Kimberly Allison RN, [facility]  [facility] Liaison  [phone] after hours

## 2025-05-01 NOTE — PROGRESS NOTES
AO x1 which is BL. Nonverbal. 2L at rest. Tele NSR. Heparin subcutaneous. Purewick. Incontinent. No pain. Mepilax on sacrum. Max assist. Rocpehin. QID. IVF infusing per MAR. Pt resting in bed with call light in reach. No further needs

## 2025-05-01 NOTE — PROGRESS NOTES
[facility]   part of [facility]     Nephrology Progress Note    Ann Herndon Patient Status:  Inpatient    1925 MRN [id number]   Location [facility] 5NW-A Attending Trevor Westbrook DO   Hosp Day # 2 PCP Cory Sanchez DO       SUBJECTIVE:  No overnight events        Physical Exam:   /42 (BP Location: Left arm)   Pulse 53   Temp 98 °F (36.7 °C) (Axillary)   Resp 16   Wt 120 lb 5.9 oz (54.6 kg)   LMP  (LMP Unknown)   SpO2 100%   BMI 26.05 kg/m²   Temp (24hrs), Av.7 °F (36.5 °C), Min:97 °F (36.1 °C), Max:98.2 °F (36.8 °C)       Intake/Output Summary (Last 24 hours) at 2025 0756  Last data filed at 2025 0620  Gross per 24 hour   Intake --   Output 400 ml   Net -400 ml     Last 3 Weights   25 0211 120 lb 5.9 oz (54.6 kg)   25 2235 120 lb 6.4 oz (54.6 kg)   25 1749 120 lb 13 oz (54.8 kg)   25 0739 120 lb 12.8 oz (54.8 kg)   25 2256 126 lb (57.2 kg)   25 1841 126 lb (57.2 kg)   23 1434 135 lb (61.2 kg)   22 1058 135 lb 2 oz (61.3 kg)   21 1412 132 lb (59.9 kg)     General: Asleep, opens eyes to voice, not meaningfully interactive in no apparent distress.  HEENT: No scleral icterus, MMM  Neck: Supple, no EILEEN or thyromegaly  Cardiac: Regular rate and rhythm, S1, S2 normal, no murmur or rub  Lungs: Clear without wheezes, rales, rhonchi.    Abdomen: Soft, non-tender. + bowel sounds, no palpable organomegaly  Extremities: Without clubbing, cyanosis or edema.  Neurologic: moving all extremities  Skin: Warm and dry, no rash        Labs:     Recent Labs   Lab 04/2925  0634 25  0653   WBC 9.1 9.7 8.4   HGB 8.9* 8.3* 8.4*   MCV 92.2 94.0 95.0   .0 251.0 247.0       Recent Labs   Lab 25  0657 25  0653   * 153* 149*   K 4.4 4.2 4.0   * 117* 113*   CO2 27.0 23.0 25.0   BUN 82* 72* 60*   CREATSERUM 4.23* 4.14* 3.85*   CA 9.8 9.6 9.1   MG  --  2.4 2.3   *  179* 153*       Recent Labs   Lab 04/29/25  1758   ALT 16   AST 35*   ALB 3.9       Recent Labs   Lab 04/30/25  0528 04/30/25  1140 04/30/25  1653 04/30/25 2000 05/01/25  0457   PGLU 171* 270* 274* 177* 175*       Meds:   Current Hospital Medications[1]      Impression/Plan:      1) Acute kidney injury on CKD IV: Has long-standing chronic kidney disease stage 4 with baseline serum creatinine of 2.4-2.9 mg/dL in setting of long-standing HTN, diabetic nephropathy and advanced age. Now presenting with acute kidney injury and hypernatremia in setting of worsening mental status and poor PO intake. JOSE ANTONIO likely 2/2 pre-renal azotemia and Creatinine cont to improve with IVF.  No further eval, no indication and not candidate for HD      2) Hypernatremia: Due to decreased free water intake d/t worsening mental status.  Improved again today, cont hypotonic IVF     3) HTN: BP stable on hydralazine and metoprolol      Sukhi Cantrell MD  5/1/2025  7:56 AM         [1]   Current Facility-Administered Medications   Medication Dose Route Frequency    hydrALAzine (Apresoline) 20 mg/mL injection 5 mg  5 mg Intravenous Q4H PRN    dextrose 5% infusion   Intravenous Continuous    glucose (Dex4) 15 GM/59ML oral liquid 15 g  15 g Oral Q15 Min PRN    Or    glucose (Glutose) 40% oral gel 15 g  15 g Oral Q15 Min PRN    Or    glucose-vitamin C (Dex-4) chewable tab 4 tablet  4 tablet Oral Q15 Min PRN    Or    dextrose 50% injection 50 mL  50 mL Intravenous Q15 Min PRN    Or    glucose (Dex4) 15 GM/59ML oral liquid 30 g  30 g Oral Q15 Min PRN    Or    glucose (Glutose) 40% oral gel 30 g  30 g Oral Q15 Min PRN    Or    glucose-vitamin C (Dex-4) chewable tab 8 tablet  8 tablet Oral Q15 Min PRN    insulin aspart (NovoLOG) 100 Units/mL FlexPen 1-10 Units  1-10 Units Subcutaneous TID AC and HS    heparin (Porcine) 5000 UNIT/ML injection 5,000 Units  5,000 Units Subcutaneous Q8H VEENA    acetaminophen (Tylenol Extra Strength) tab 500 mg  500 mg Oral  Q4H PRN    polyethylene glycol (PEG 3350) (Miralax) 17 g oral packet 17 g  17 g Oral Daily PRN    sennosides (Senokot) tab 17.2 mg  17.2 mg Oral Nightly PRN    ondansetron (Zofran) 4 MG/2ML injection 4 mg  4 mg Intravenous Q6H PRN    metoclopramide (Reglan) 5 mg/mL injection 5 mg  5 mg Intravenous Q8H PRN    multivitamin (Dialyvite - Renal) tab 1 tablet  1 tablet Oral Daily    bisacodyl (Dulcolax) 10 MG rectal suppository 10 mg  10 mg Rectal Daily PRN    metoprolol succinate ER (Toprol XL) 24 hr tab 25 mg  25 mg Oral Daily Beta Blocker    senna-docusate (Senokot-S) 8.6-50 MG per tab 1 tablet  1 tablet Oral BID    simethicone (Mylicon) chewable tab 80 mg  80 mg Oral Q6H PRN    cefTRIAXone (Rocephin) 1 g in sodium chloride 0.9% 100 mL IVPB-ADDV  1 g Intravenous Q24H

## 2025-05-01 NOTE — CONSULTS
[facility]   part of [facility]  Palliative Care Initial Consult    Ann Herndon Patient Status:  Inpatient    1925 MRN [id number]   Location [facility] 5NW-A Attending Trevor Westbrook DO   Hosp Day # 2 PCP Cory Sanchez DO   [phone]     Date of Consult: 25   Today is day #2 of hospital stay.     Palliative care consultation was requested by Dr. Westbrook for evaluation of palliative care needs and support with [facility] palliative care. nAn is known to our service as we followed for support w [facility] on most recent admission.     Subjective       HPI:  Ann Herndon is a 89 year old female with PMH significant for Dementia, CKD, HTN, HLD,, presented to ED on 2025 with CC of elevated Na levels.  Initial workup revealed Hypernatremia, JOSE ANTONIO, recurrent UTI, cardiomegaly and pulmonary venous congestion, constipation.     Therapy recommendations are JHOANA.   SLP recommends NPO.      HPI obtained from Epic and daughter.    This is the 2nd hospitalization in the past month.  - 25 - 25:  Hypernatremia, JOSE ANTONIO,     Medical History: obtained from [facility]  Past Medical History[1]  Past Surgical History[2]    Allergies:  Allergies[3]    Medications:   Current Hospital Medications[4]  Prior to Admission Medications[5]    Functional Status History:  ADLs: bathing or showering, dressing, getting in and out of bed or a chair, walking, using the toilet, and eating  - HIGH  5+ performance deficits   IADLs: use the phone, shop for groceries, meal preparation, manage medicines, clean living area, use transportation by self, manage money  - HIGH  5+ performance deficits     Palliative Care Social History:   Marital Status:   Children: Yes  Living Situation Prior to Admit: Home w family, most recently at [facility] for JHOANA  Does Patient Live Alone: No  Is Patient Confused:  carlitos, non verbal  Occupational History:  deferred      Set as collapsible by default.[6]    Substance  History:   reports that she has never smoked. She has never used smokeless tobacco.  reports current alcohol use.  reports no history of drug use.      Spiritual Assessment:   Taoism - [place]      REVIEW OF SYSTEMS:     I visited Ann with her daughter Rubi and sons Miguel and Rex at bedside. She is not verbal today.  No nonverbal signs of discomfort or distress noted.    Family and RN reported restlessness and pulling at lines, Ivs and monitoring equipment  OBJECTIVE       Medications:  Current Hospital Medications[7]    Hematology:   Lab Results   Component Value Date    WBC 8.4 05/01/2025    HGB 8.4 (L) 05/01/2025    HCT 28.6 (L) 05/01/2025    .0 05/01/2025       Chemistry:  Lab Results   Component Value Date    CREATSERUM 3.85 (H) 05/01/2025    BUN 60 (H) 05/01/2025     (H) 05/01/2025    K 4.0 05/01/2025     (H) 05/01/2025    CO2 25.0 05/01/2025     (H) 05/01/2025    CA 9.1 05/01/2025    ALB 3.9 04/29/2025    ALKPHO 80 04/29/2025    BILT 0.4 04/29/2025    TP 7.4 04/29/2025    AST 35 (H) 04/29/2025    ALT 16 04/29/2025    DDIMER 0.82 (H) 11/22/2021    BNP 17 01/12/2013    MG 2.3 05/01/2025    TROP <0.045 03/19/2021       Imaging:  XR CHEST AP PORTABLE  (CPT=71045)  Result Date: 4/29/2025  CONCLUSION:  Shallow inspiration.  There is cardiomegaly with pulmonary venous congestion.  Slight improved aeration retrocardiac left lung base with persistent bibasilar atelectasis.   LOCATION:  [id number]      Dictated by (CST): Brenda Shrestha MD on 4/29/2025 at 6:27 PM     Finalized by (CST): Brenda Shrestha MD on 4/29/2025 at 6:27 PM         Vital Signs:  Blood pressure 146/42, pulse 53, temperature 98 °F (36.7 °C), temperature source Axillary, resp. rate 16, weight 120 lb 5.9 oz (54.6 kg), SpO2 100%, not currently breastfeeding.      Supplemental O2:       Physical Exam:     GENERAL: Awake/asleep in bed. NAD.  BODY HABITUS:  Body mass index is 26.05 kg/m². RESPIRATORY: no increased effort  at rest on room air.  NEUROLOGIC: carlitos orientation. Moving upper extremities  PSYCHIATRIC:  resting w/o agitation at present.    Pre-hospital Palliative Performance Scale: (pt/family reported/per chart review): 30%  Current Palliative Performance Scale:  30%    Palliative Performance Scale   % Ambulation Activity Level Self-Care Intake Consciousness   100 Full Normal Full   Normal Full   90 Full No disease  Normal Full Normal Full   80 Full Some disease  Normal w/effort Full Normal or  Reduced Full   70 Reduced Some disease  Can't perform job Full Normal or   Reduced Full   60 Reduced Significant disease  Can't perform hobby Occasional  Assist Normal or   Reduced Full or confused   50 Mainly sit/lie Extensive Disease  Can't do any work Partial Assist Normal or Reduced Full or confused   40 Mainly in bed Extensive Disease  Can't do any work Mainly Assist Normal or Reduced Full or confused   30 Bedbound Extensive Disease  Can't do any work Max Assist  Total Care Reduced Drowsy/confused   20 Bedbound Extensive Disease  Can't do any work Max Assist  Total Care Minimal Drowsy/confused   10 Bedbound/coma Extensive Disease  Can't do any work  coma  Max Assist  Total Care Mouth care Drowsy or coma   0 Death     Palliative Care Assessment       Goals of Care:        Diagnostic understanding and Prognostic Awareness:  I visited Ann with Miguel Venegas and Rex at her bedside. Rubi voiced that she has been having difficulty w eating at [facility]. Sometimes she is too sleepy to take food. Sometimes she is pocketing and forgetting to swallow, other times she won't accept food. They are aware of SLP recommendation for NPO yesterday, and hope for re-eval today to help inform their decision-making.    Discussed her presentation as consistent w end-stage dementia / EOL phase, and problems like hypernatremia, JOSE ANTONIO, recurrent infections will lead to recurrent hospital stays with increasing frequency and complexity. Rubi voiced concern that  Seb is not speaking much anymore, and not recognizing members of her family.    Discussed that acute problems can still be treated/reversed while they consider the overall direction of her care. Rex asked about hospice at [facility]. There has been some exploration of LTC there, and they were considering finances - maybe possibly because she was not meeting rehab goals. They are aware that room and board at facilities under hospice is self-pay - Rex's wife had hospice care and passed at [facility]. They are open to talking w hospice today while they gather information about their options, and assess her response to hospital-based care.      Hopes / Goals:  For more information about her ability to respond to treatment for hypernatremia, JOSE ANTONIO, UTI, and dysphagia.  Hoping to see SLP again today for input and possibility of liberalizing diet.  For her to have comfort, dignity, and peace.    Concerns / Fears:  That this is her EOL.  Emotional support provided to Seb's family.         Advance Care Planning:    HCPOA:  Document on file.  Healthcare Agent Appointed: Yes  Healthcare Agent's Name: seb engle- daughter  Healthcare Agent's Phone Number: [phone]    Code Status:  DNAR/Selective Treatment  A voluntary discussion surrounding resuscitation and LST took place with Seb's family who confirms CPR and intubation not in line w current goals as it would not change her outcome.   POLST - deferred as GOC are being defined, hospice is under consideration.    Problem List:       Principal Problem:    Hypernatremia  Active Problems:    Palliative care encounter    Counseling regarding advance care planning and goals of care    CKD (chronic kidney disease) stage 5, GFR less than 15 ml/min ([facility])      Palliative Care Recommendations / Plan:       Goals of Care:   Family open to meeting w hospice for eval, for possible care at [facility] if possible. Hospice consult placed.  Wanting re-eval from SLP today to inform  their decision-making processes. PEG possibly not in line w GOC, and would not advise given advanced age and dementia.    Code Status: DNAR/Selective Treatment.  Confirmed w family 5/1. POLST - will need at DC, deferred as GOC are being defined.    HCPOA: Daughter, Rubi.     Disposition:  pending clinical course.      A total of 50 minutes were spent on this consult, which included all of the following:  Chart review, direct face to face contact, history taking, physical examination, counseling and coordinating care, and documentation.     I reviewed the above with patient's RN and SW.    Thank you for inviting Palliative Care Service to participate in the care of Ann Herndon and family.       Will follow.      RANDI Ann  Palliative Care    5/1/2025  11:03 AM      The 21st Century Cures Act makes medical notes like these available to patients in the interest of transparency. Please be advised this is a medical document. Medical documents are intended to carry relevant information, facts as evident, and the clinical opinion of the practitioner. The medical note is intended as a peer to peer communication, and may appear blunt or direct. It is written in medical language and may contain abbreviations or verbiage that are unfamiliar.           [1]   Past Medical History:   Anemia    Arthritis    Back problem    herniated discs L4-L5    Blood disorder    anemia    Cellulitis, leg    Dementia (HCC)    Depression    Elevated blood pressure reading without diagnosis of hypertension    Esophageal reflux    Gastritis    GENITO-URINARY DISEASE    Glaucoma    HIGH BLOOD PRESSURE    HIGH CHOLESTEROL    Kidney disease    Lumbar degenerative disc disease    Lumbar radiculitis    Lumbar spondylosis    MADHU (obstructive sleep apnea)    AHI 5 RDI 5 REM AHI 0 Supine AHI 2 non-supine AHI 6     MADHU (obstructive sleep apnea)    Osteoarthrosis, unspecified whether generalized or localized, unspecified site    Other and  unspecified hyperlipidemia    Pain in limb    Personal history of urinary (tract) infection    below th eknee    Renal disorder    elevated BUN and creatinine    Shortness of breath    uses oxygen 2.5 L/NC every night for hx of low saturation identified during sleep study 2014    Spinal stenosis    Type II or unspecified type diabetes mellitus without mention of complication, not stated as uncontrolled    Unspecified acute reaction to stress    Unspecified essential hypertension    Visual impairment    glasses   [2]   Past Surgical History:  Procedure Laterality Date    Angiogram  FEB 2013    Angiogram  Feb 2013    Done in [facility]    Appendectomy  1949    Appendectomy      Cataract surgery, complex  08/2010    Cath percutaneous  transluminal coronary angioplasty  2/2012    Normal in [facility]    Colonoscopy N/A 11/15/2016    Procedure: COLONOSCOPY;  Surgeon: Levar Callaway MD;  Location:  ENDOSCOPY    Colonoscopy & polypectomy  11/15/16= Polyp (serrated)    Repeat PRN    Fluor gid & loclzj ndl/cath spi dx/ther njx  8/30/2013    Procedure: LUMBAR EPIDURAL;  Surgeon: Srini Benjamin MD;  Location: [facility]    Fluor gid & loclzj ndl/cath spi dx/ther njx  9/16/2013    Procedure: LUMBAR EPIDURAL;  Surgeon: Srini Benjamin MD;  Location: [facility]    Fluor gid & loclzj ndl/cath spi dx/ther njx  10/8/2013    Procedure: LUMBAR EPIDURAL;  Surgeon: Srini Benjamin MD;  [facility]: [facility]    Fluor gid & loclzj ndl/cath spi dx/ther njx  6/24/2014    Procedure: ;  Surgeon: Srini Benjamin MD;  Location: [facility]    Fluor gid & loclzj ndl/cath spi dx/ther njx N/A 7/24/2014    Procedure: LUMBAR EPIDURAL;  Surgeon: Srini Benjamin MD;  Location: [facility]    Hysterectomy      Hysterectomy  1974    Injection, w/wo contrast, dx/therapeutic substance, epidural/subarachnoid; lumbar/sacral  8/30/2013    Procedure: LUMBAR EPIDURAL;   Surgeon: Srini Benjamin MD;  [facility]: [facility]    Injection, w/wo contrast, dx/therapeutic substance, epidural/subarachnoid; lumbar/sacral  9/16/2013    Procedure: LUMBAR EPIDURAL;  Surgeon: Srini Benjamin MD;  Location: [facility]    Injection, w/wo contrast, dx/therapeutic substance, epidural/subarachnoid; lumbar/sacral  10/8/2013    Procedure: LUMBAR EPIDURAL;  Surgeon: Srini Benjamin MD;  Location: [facility]    Injection, w/wo contrast, dx/therapeutic substance, epidural/subarachnoid; lumbar/sacral N/A 6/24/2014    Procedure: LUMBAR EPIDURAL;  Surgeon: Srini Benjamin MD;  Location: [facility]    Injection, w/wo contrast, dx/therapeutic substance, epidural/subarachnoid; lumbar/sacral N/A 7/24/2014    Procedure: LUMBAR EPIDURAL;  Surgeon: Srini Benjamin MD;  Location: [facility]    M-sedaj by  phys perfrmg svc 5+ yr  8/30/2013    Procedure: LUMBAR EPIDURAL;  Surgeon: Srini Benjamin MD;  Location: [facility]    M-sedaj by  phys perfrmg svc 5+ yr  9/16/2013    Procedure: LUMBAR EPIDURAL;  Surgeon: Srini Benjamin MD;  Location: [facility]    M-sedaj by  phys perfrmg svc 5+ yr  10/8/2013    Procedure: LUMBAR EPIDURAL;  Surgeon: Srini Benjamin MD;  [facility]    M-sedaj by  phys perfrmg svc 5+ yr  6/24/2014    Procedure: ;  Surgeon: Srini Benjamin MD;  Location: [facility]    M-sedaj by  phys perfrmg svc 5+ yr N/A 7/24/2014    Procedure: LUMBAR EPIDURAL;  Surgeon: Srini Benjamin MD;  Location: [facility]    Patient documented not to have experienced any of the following events  8/30/2013    Procedure: LUMBAR EPIDURAL;  Surgeon: Srini Benjamin MD;  Location: [facility]    Patient documented not to have experienced any of the following events  9/16/2013    Procedure:  LUMBAR EPIDURAL;  Surgeon: Srini Benjamin MD;  [facility]    Patient documented not to have experienced any of the following events  10/8/2013    Procedure: LUMBAR EPIDURAL;  Surgeon: Srini Benjamin MD;  [facility]    Patient documented not to have experienced any of the following events  6/24/2014    Procedure: ;  Surgeon: Srini Benjamin MD;  Location: [facility]    Patient documented not to have experienced any of the following events N/A 7/24/2014    Procedure: LUMBAR EPIDURAL;  Surgeon: Srini Benjamin MD;  [facility]    Patient withough preoperative order for iv antibiotic surgical site infection prophylaxis.  8/30/2013    Procedure: LUMBAR EPIDURAL;  Surgeon: Srini Benjamin MD;  Location: [facility]    Patient withough preoperative order for iv antibiotic surgical site infection prophylaxis.  9/16/2013    Procedure: LUMBAR EPIDURAL;  Surgeon: Srini Benjamin MD;  Location: [facility]    Patient withough preoperative order for iv antibiotic surgical site infection prophylaxis.  10/8/2013    Procedure: LUMBAR EPIDURAL;  Surgeon: Srini Benjamin MD;  Location: [facility]    Patient withough preoperative order for iv antibiotic surgical site infection prophylaxis.  6/24/2014    Procedure: ;  Surgeon: Srini Benjamin MD;  Location: [facility]    Patient withough preoperative order for iv antibiotic surgical site infection prophylaxis. N/A 7/24/2014    Procedure: LUMBAR EPIDURAL;  Surgeon: Srini Benjamin MD;  Location: [facility]    Upper gi endoscopy performed  10/7/13   [3]   Allergies  Allergen Reactions    Bee Venom SWELLING   [4]   Current Facility-Administered Medications:     sodium chloride 0.45% infusion, , Intravenous, Continuous    hydrALAzine (Apresoline) 20 mg/mL injection 5 mg, 5 mg,  Intravenous, Q4H PRN    glucose (Dex4) 15 GM/59ML oral liquid 15 g, 15 g, Oral, Q15 Min PRN **OR** glucose (Glutose) 40% oral gel 15 g, 15 g, Oral, Q15 Min PRN **OR** glucose-vitamin C (Dex-4) chewable tab 4 tablet, 4 tablet, Oral, Q15 Min PRN **OR** dextrose 50% injection 50 mL, 50 mL, Intravenous, Q15 Min PRN **OR** glucose (Dex4) 15 GM/59ML oral liquid 30 g, 30 g, Oral, Q15 Min PRN **OR** glucose (Glutose) 40% oral gel 30 g, 30 g, Oral, Q15 Min PRN **OR** glucose-vitamin C (Dex-4) chewable tab 8 tablet, 8 tablet, Oral, Q15 Min PRN    insulin aspart (NovoLOG) 100 Units/mL FlexPen 1-10 Units, 1-10 Units, Subcutaneous, TID AC and HS    heparin (Porcine) 5000 UNIT/ML injection 5,000 Units, 5,000 Units, Subcutaneous, Q8H VEENA    acetaminophen (Tylenol Extra Strength) tab 500 mg, 500 mg, Oral, Q4H PRN    polyethylene glycol (PEG 3350) (Miralax) 17 g oral packet 17 g, 17 g, Oral, Daily PRN    sennosides (Senokot) tab 17.2 mg, 17.2 mg, Oral, Nightly PRN    ondansetron (Zofran) 4 MG/2ML injection 4 mg, 4 mg, Intravenous, Q6H PRN    metoclopramide (Reglan) 5 mg/mL injection 5 mg, 5 mg, Intravenous, Q8H PRN    multivitamin (Dialyvite - Renal) tab 1 tablet, 1 tablet, Oral, Daily    bisacodyl (Dulcolax) 10 MG rectal suppository 10 mg, 10 mg, Rectal, Daily PRN    metoprolol succinate ER (Toprol XL) 24 hr tab 25 mg, 25 mg, Oral, Daily Beta Blocker    senna-docusate (Senokot-S) 8.6-50 MG per tab 1 tablet, 1 tablet, Oral, BID    simethicone (Mylicon) chewable tab 80 mg, 80 mg, Oral, Q6H PRN    cefTRIAXone (Rocephin) 1 g in sodium chloride 0.9% 100 mL IVPB-ADDV, 1 g, Intravenous, Q24H  [5]   No current outpatient medications on file.   [6]   Social History  Socioeconomic History    Marital status:    Tobacco Use    Smoking status: Never    Smokeless tobacco: Never   Vaping Use    Vaping status: Never Used   Substance and Sexual Activity    Alcohol use: Yes     Comment: rarely    Drug use: No   [7]   Current  Facility-Administered Medications:     sodium chloride 0.45% infusion, , Intravenous, Continuous    hydrALAzine (Apresoline) 20 mg/mL injection 5 mg, 5 mg, Intravenous, Q4H PRN    glucose (Dex4) 15 GM/59ML oral liquid 15 g, 15 g, Oral, Q15 Min PRN **OR** glucose (Glutose) 40% oral gel 15 g, 15 g, Oral, Q15 Min PRN **OR** glucose-vitamin C (Dex-4) chewable tab 4 tablet, 4 tablet, Oral, Q15 Min PRN **OR** dextrose 50% injection 50 mL, 50 mL, Intravenous, Q15 Min PRN **OR** glucose (Dex4) 15 GM/59ML oral liquid 30 g, 30 g, Oral, Q15 Min PRN **OR** glucose (Glutose) 40% oral gel 30 g, 30 g, Oral, Q15 Min PRN **OR** glucose-vitamin C (Dex-4) chewable tab 8 tablet, 8 tablet, Oral, Q15 Min PRN    insulin aspart (NovoLOG) 100 Units/mL FlexPen 1-10 Units, 1-10 Units, Subcutaneous, TID AC and HS    heparin (Porcine) 5000 UNIT/ML injection 5,000 Units, 5,000 Units, Subcutaneous, Q8H VEENA    acetaminophen (Tylenol Extra Strength) tab 500 mg, 500 mg, Oral, Q4H PRN    polyethylene glycol (PEG 3350) (Miralax) 17 g oral packet 17 g, 17 g, Oral, Daily PRN    sennosides (Senokot) tab 17.2 mg, 17.2 mg, Oral, Nightly PRN    ondansetron (Zofran) 4 MG/2ML injection 4 mg, 4 mg, Intravenous, Q6H PRN    metoclopramide (Reglan) 5 mg/mL injection 5 mg, 5 mg, Intravenous, Q8H PRN    multivitamin (Dialyvite - Renal) tab 1 tablet, 1 tablet, Oral, Daily    bisacodyl (Dulcolax) 10 MG rectal suppository 10 mg, 10 mg, Rectal, Daily PRN    metoprolol succinate ER (Toprol XL) 24 hr tab 25 mg, 25 mg, Oral, Daily Beta Blocker    senna-docusate (Senokot-S) 8.6-50 MG per tab 1 tablet, 1 tablet, Oral, BID    simethicone (Mylicon) chewable tab 80 mg, 80 mg, Oral, Q6H PRN    cefTRIAXone (Rocephin) 1 g in sodium chloride 0.9% 100 mL IVPB-ADDV, 1 g, Intravenous, Q24H

## 2025-05-01 NOTE — SLP NOTE
SPEECH DAILY NOTE - INPATIENT    ASSESSMENT & PLAN   ASSESSMENT  Patient seen for ongoing swallowing diagnostic intervention.  Daughter present.  Patient awakens to verbal stimuli; not verbally responsive.  Did not follow commands consistently.  Patient tolerated gentle oral moisture to outer lips.  PO trials offered of baseline diet texture including puree and mildly thick liquid via tspn presentations.  Patient with reduced oral formation and manipulation.  Suspect increased oral holding with suspected delayed initiation of swallow response.  Pharyngeal swallow response was palpable however significant delay and reduced active participation. Limited PO trials administered due to patient status.    Extensive time spent with patient/daughter on education and counseling regarding reduced mental status and awareness along with advanced age related changes to swallow function likely resulting in reduced PO intake recently.  Recommend trial PO textures of puree and mildly thick liquid for pleasure amounts.  Defer to MD/POA for further goals of care.  D/W RN.         Diet Recommendations - Solids: Puree (for pleasure, if/when patient able to accept/tolerate)  Diet Recommendations - Liquids: Nectar thick liquids/ Mildly thick (for pleasure, if/when patient is able to accept/tolerate)    Compensatory Strategies Recommended: Liquids via spoon  Aspiration Precautions: 1:1 feeding, Cueing to swallow, Small bites, Small sips  Medication Administration Recommendations: Non-oral    Patient Experiencing Pain: No                Treatment Plan  Treatment Plan/Recommendations: Aspiration precautions (education/counseling as needed)    Interdisciplinary Communication: Discussed with RN  Disussed with other staff  Discussed with physician            GOALS  Goal #1 The patient will tolerate puree consistency and mildly thick liquids without overt signs or symptoms of aspiration with 80 % accuracy over 1-2 session(s).  In Progress    Goal #2 The patient/family/caregiver will demonstrate understanding and implementation of aspiration precautions and swallow strategies independently over 1-2 session(s).    In Progress     FOLLOW UP  Follow Up Needed (Documentation Required): Yes  SLP Follow-up Date: 05/02/25  Duration: 2 weeks    Session: 1    If you have any questions, please contact JANETT Reed MS CCC-SLP/L  Speech-Language Pathologist  Spectra-Link [phone]

## 2025-05-02 VITALS
TEMPERATURE: 98 F | RESPIRATION RATE: 16 BRPM | HEART RATE: 55 BPM | WEIGHT: 120.38 LBS | BODY MASS INDEX: 26 KG/M2 | SYSTOLIC BLOOD PRESSURE: 166 MMHG | DIASTOLIC BLOOD PRESSURE: 44 MMHG | OXYGEN SATURATION: 99 %

## 2025-05-02 LAB
ANION GAP SERPL CALC-SCNC: 8 MMOL/L (ref 0–18)
BASOPHILS # BLD AUTO: 0.02 X10(3) UL (ref 0–0.2)
BASOPHILS NFR BLD AUTO: 0.3 %
BUN BLD-MCNC: 52 MG/DL (ref 9–23)
CALCIUM BLD-MCNC: 8.9 MG/DL (ref 8.7–10.6)
CHLORIDE SERPL-SCNC: 111 MMOL/L (ref 98–112)
CO2 SERPL-SCNC: 24 MMOL/L (ref 21–32)
CREAT BLD-MCNC: 3.53 MG/DL (ref 0.55–1.02)
EGFRCR SERPLBLD CKD-EPI 2021: 11 ML/MIN/1.73M2 (ref 60–?)
EOSINOPHIL # BLD AUTO: 0.14 X10(3) UL (ref 0–0.7)
EOSINOPHIL NFR BLD AUTO: 2 %
ERYTHROCYTE [DISTWIDTH] IN BLOOD BY AUTOMATED COUNT: 14.5 %
GLUCOSE BLD-MCNC: 118 MG/DL (ref 70–99)
GLUCOSE BLD-MCNC: 121 MG/DL (ref 70–99)
GLUCOSE BLD-MCNC: 137 MG/DL (ref 70–99)
HCT VFR BLD AUTO: 26.6 % (ref 35–48)
HGB BLD-MCNC: 7.7 G/DL (ref 12–16)
IMM GRANULOCYTES # BLD AUTO: 0.04 X10(3) UL (ref 0–1)
IMM GRANULOCYTES NFR BLD: 0.6 %
LYMPHOCYTES # BLD AUTO: 1.11 X10(3) UL (ref 1–4)
LYMPHOCYTES NFR BLD AUTO: 15.5 %
MAGNESIUM SERPL-MCNC: 2.1 MG/DL (ref 1.6–2.6)
MCH RBC QN AUTO: 27.6 PG (ref 26–34)
MCHC RBC AUTO-ENTMCNC: 28.9 G/DL (ref 31–37)
MCV RBC AUTO: 95.3 FL (ref 80–100)
MONOCYTES # BLD AUTO: 0.41 X10(3) UL (ref 0.1–1)
MONOCYTES NFR BLD AUTO: 5.7 %
NEUTROPHILS # BLD AUTO: 5.43 X10 (3) UL (ref 1.5–7.7)
NEUTROPHILS # BLD AUTO: 5.43 X10(3) UL (ref 1.5–7.7)
NEUTROPHILS NFR BLD AUTO: 75.9 %
OSMOLALITY SERPL CALC.SUM OF ELEC: 311 MOSM/KG (ref 275–295)
PLATELET # BLD AUTO: 195 10(3)UL (ref 150–450)
POTASSIUM SERPL-SCNC: 3.8 MMOL/L (ref 3.5–5.1)
RBC # BLD AUTO: 2.79 X10(6)UL (ref 3.8–5.3)
SODIUM SERPL-SCNC: 143 MMOL/L (ref 136–145)
WBC # BLD AUTO: 7.2 X10(3) UL (ref 4–11)

## 2025-05-02 NOTE — DISCHARGE SUMMARY
[facility] Discharge Summary    Patient ID  Ann Herndon  [id number]  89 year old  2/28/1925    Admit date: 4/29/2025    Discharge date: 05/02/25    Attending: Trevor Westbrook DO     Primary Care Physician: Cory Sanchez DO      Reason for admission: hypernatremia, jose antonio    Discharge condition: terminal    Disposition: hospice    Important follow up:       Additional patient instructions       Discharge med list     Medication List        ASK your doctor about these medications      acetaminophen 325 MG Tabs  Commonly known as: Tylenol     bisacodyl 10 MG Supp  Commonly known as: Dulcolax     glimepiride 1 MG Tabs  Commonly known as: Amaryl  TAKE 1 TABLET(1 MG) BY MOUTH DAILY WITH BREAKFAST  Ask about: Which instructions should I use?     HumaLOG 100 UNIT/ML Soln  Generic drug: insulin lispro     hydrALAZINE 25 MG Tabs  Commonly known as: Apresoline     metoprolol succinate ER 25 MG Tb24  Commonly known as: Toprol XL     MILK OF MAGNESIA OR     Nepro Liqd     polyethylene glycol (PEG 3350) 17 g Pack  Commonly known as: Miralax     Renal Vitamin 0.8 MG Tabs     senna-docusate 8.6-50 MG Tabs  Commonly known as: Senokot-S     simethicone 80 MG Chew  Commonly known as: Mylicon              Discharge Diagnoses:    Hypernatremia  JOSE ANTONIO on CKD4 in setting of long-standing HTN, diabetic nephropathy and advanced age. Baseline Cr 2.4-2.9  History of recurrent UTI   HTN  DM 2  Constipation     Consults:  IP CONSULT TO NEPHROLOGY  IP CONSULT TO HOSPITALIST  IP CONSULT TO FOOD AND NUTRITION SERVICES  IP CONSULT PALLIATIVE CARE  IP CONSULT TO SOCIAL WORK  IP CONSULT TO HOSPICE    Radiology:  XR CHEST AP PORTABLE  (CPT=71045)  Result Date: 4/29/2025  PROCEDURE:  XR CHEST AP PORTABLE  (CPT=71045)  TECHNIQUE:  AP chest radiograph was obtained.  COMPARISON:  [facility] , XR, XR CHEST AP PORTABLE  (CPT=71045), 4/17/2025, 2:03 PM.  INDICATIONS:  elvated BUN and Cret. known renal failure  PATIENT STATED HISTORY: (As transcribed  by Technologist)  Patient offered no additional history at this time.               CONCLUSION:  Shallow inspiration.  There is cardiomegaly with pulmonary venous congestion.  Slight improved aeration retrocardiac left lung base with persistent bibasilar atelectasis.   LOCATION:  [id number]      Dictated by (CST): Brenda Shrestha MD on 4/29/2025 at 6:27 PM     Finalized by (CST): Brenda Shrestha MD on 4/29/2025 at 6:27 PM       US VENOUS DOPPLER ARM LEFT - DIAG IMG (CPT=93971)  Result Date: 4/20/2025  PROCEDURE:  US VENOUS DOPPLER ARM LEFT - DIAG IMG (CPT=93971)  COMPARISON:  None.  INDICATIONS:  swelling  TECHNIQUE:  Real time, grey scale, and duplex ultrasound was used to evaluate the upper extremity venous system. B-mode two-dimensional images of the vascular structures, Doppler spectral analysis, and color flow.  Doppler imaging were performed.  The following veins were imaged:  Subclavian, Jugular, Axillary, Brachial, Basilic, Cephalic, and the contralateral Subclavian and Jugular.  PATIENT STATED HISTORY: (As transcribed by Technologist)     FINDINGS:  EXTREMITY:  Left upper extremity THROMBI:  None visible. COMPRESSION:  Normal compressibility, phasicity, and augmentation of the subclavian, jugular, axillary, brachial, basilic, and cephalic veins. OTHER:  Negative.            CONCLUSION:  No evidence of DVT in the left upper extremity.   LOCATION:  [facility]   Dictated by (CST): Cristo Hawk MD on 4/20/2025 at 11:12 AM     Finalized by (CST): Cristo Hawk MD on 4/20/2025 at 11:12 AM       CT BRAIN OR HEAD (CPT=70450)  Result Date: 4/17/2025  PROCEDURE:  CT BRAIN OR HEAD (38283)  COMPARISON:  [facility] , CT, CT BRAIN OR HEAD (11872), 4/13/2025, 8:11 PM.  INDICATIONS:  Altered mental status  TECHNIQUE:  Noncontrast CT scanning is performed through the brain. Dose reduction techniques were used. Dose information is transmitted to the ACR (American College of Radiology) NRDR (National Radiology Data Registry)  which includes the Dose Index Registry.  PATIENT STATED HISTORY: (As transcribed by Technologist)  Altered mental state. Patient unable to provide history   FINDINGS: Stable mild-to-moderate global brain parenchymal volume loss without overt hydrocephalus.  There is no midline shift or mass-effect.  The basal cisterns are patent.  The gray-white matter differentiation is intact.  There is no acute intracranial hemorrhage or extra-axial fluid collection.  No evidence of acute territorial infarction.  Stable mild chronic microvascular ischemic changes in the cerebral white matter.  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  There is no evident fracture.  Minimal left maxillary mucosal thickening.  The mastoid air cells are unremarkable.  Bilateral intra-ocular lens implants.             CONCLUSION:  No acute intracranial abnormality identified.  Stable chronic ischemic changes as above. If there is clinical concern for acute ischemia/infarction, an MRI of the brain would be recommended for further evaluation.  LOCATION:  [facility]   Dictated by (CST): Fernando Hughes MD on 4/17/2025 at 7:47 PM     Finalized by (CST): Fernando Hughes MD on 4/17/2025 at 7:50 PM       XR CHEST AP PORTABLE  (CPT=71045)  Result Date: 4/17/2025  PROCEDURE:  XR CHEST AP PORTABLE  (CPT=71045)  TECHNIQUE:  AP chest radiograph was obtained.  COMPARISON:  [facility] , XR, XR CHEST AP PORTABLE  (CPT=71045), 4/13/2025, 8:38 PM.  INDICATIONS:  sob  PATIENT STATED HISTORY: (As transcribed by Technologist)  Patient offered no additional history at this time.     FINDINGS:  There is a small left pleural effusion with airspace opacity at the left lung base could represent atelectasis or pneumonia.  Heart size is stable enlarged.  Degenerative changes of the spine.            CONCLUSION:  Airspace opacity at the left lung base with small left pleural effusion.  This could represent atelectasis or pneumonia.   LOCATION:  [facility]      Dictated by (CST):  Agustin Obrien MD on 4/17/2025 at 3:00 PM     Finalized by (CST): Agustin Obrien MD on 4/17/2025 at 3:00 PM       US VENOUS DOPPLER ARM RIGHT - DIAG IMG (CPT=93971)  Result Date: 4/17/2025  PROCEDURE:  US VENOUS DOPPLER ARM RIGHT - DIAG IMG (CPT=93971)  COMPARISON:  None.  INDICATIONS:  swelling of right arm  TECHNIQUE:  Real time, grey scale, and duplex ultrasound was used to evaluate the upper extremity venous system. B-mode two-dimensional images of the vascular structures, Doppler spectral analysis, and color flow.  Doppler imaging were performed.  The following veins were imaged:  Subclavian, Jugular, Axillary, Brachial, Basilic, Cephalic, and the contralateral Subclavian and Jugular.  FINDINGS:  EXTREMITY:  Right upper extremity THROMBI:  None visible. COMPRESSION:  Normal compressibility, phasicity, and augmentation of the subclavian, jugular, axillary, brachial, basilic, and cephalic veins. OTHER:  Negative.            CONCLUSION:  No evidence for DVT or superficial thrombophlebitis in the right upper extremity.   LOCATION:  [facility]   Dictated by (CST): Baltazar Baptiste MD on 4/17/2025 at 7:47 AM     Finalized by (CST): Baltazar Baptiste MD on 4/17/2025 at 7:48 AM       CT ABDOMEN+PELVIS KIDNEYSTONE 2D RNDR(NO IV,NO ORAL)(CPT=74176)  Result Date: 4/13/2025  PROCEDURE:  CT ABDOMEN+PELVIS KIDNEYSTONE 2D RNDR(NO IV,NO ORAL)(CPT=74176)  COMPARISON:  None.  INDICATIONS:  r/o kidney stone, constipation  TECHNIQUE:  Unenhanced multislice CT scanning from above the kidneys to below the urinary bladder.  2D rendering are generated on the CT scanner workstation to localize potential stones in the cranio-caudal plane.  Dose reduction techniques were used. Dose information is transmitted to the ACR (American College of Radiology) NRDR (National Radiology Data Registry) which includes the Dose Index Registry.  PATIENT STATED HISTORY: (As transcribed by Technologist)  Constipation with weakness.    FINDINGS:  KIDNEYS:   There are no obstructing stones.  There are vascular calcifications in the renal jamin bilaterally. BLADDER:  There is bladder wall thickening and mild perivesical stranding.  This could represent cystitis. ADRENALS:  No mass or enlargement.  LIVER:  There are calcifications in the gallbladder wall.  There is no evidence of acute cholecystitis. BILIARY:  No visible dilatation or calcification.  PANCREAS:  No lesion, fluid collection, ductal dilatation, or atrophy.  SPLEEN:  No enlargement or focal lesion.  AORTA/VASCULAR:  Aorta is atherosclerotic but not aneurysmal. RETROPERITONEUM:  No mass or adenopathy.  BOWEL/MESENTERY:  No visible mass, obstruction, or bowel wall thickening.  ABDOMINAL WALL:  No mass or hernia.  BONES:  There is advanced degenerative change in the lumbar spine. PELVIC ORGANS:  Normal for age.  LUNG BASES:  There is dependent atelectasis in the lower lobes. OTHER:  Negative.             CONCLUSION:  1. Mild bladder wall thickening and perivesical stranding could indicate cystitis. 2. There are no renal or ureteral stones.  There are vascular calcifications in renal jamin bilaterally. 3. There are calcifications in the wall of the gallbladder. 4. There is aortic atherosclerosis without aneurysm.     LOCATION:  [facility]   Dictated by (CST): Jamshid Silverio MD on 4/13/2025 at 11:19 PM     Finalized by (CST): Jamshid Silverio MD on 4/13/2025 at 11:23 PM       XR CHEST AP PORTABLE  (CPT=71045)  Result Date: 4/13/2025  PROCEDURE:  XR CHEST AP PORTABLE  (CPT=71045)  TECHNIQUE:  AP chest radiograph was obtained.  COMPARISON:  [facility] , XR, XR CHEST AP PORTABLE  (CPT=71045), 3/19/2021, 11:42 AM.  INDICATIONS:  pna  PATIENT STATED HISTORY: (As transcribed by Technologist)  Patient stated falling and has left hip pain and right shoulder pain.    FINDINGS:  There are dependent densities in lower lungs.  This is more conspicuous at the left lung base.  This could represent dependent atelectasis although pneumonia  would not be excluded.  Heart size is within normal limits.  Aorta is atherosclerotic.  Chest wall structures are unremarkable.            CONCLUSION:  There are dependent densities in lower lungs bilaterally greater on the left than the right.  This could represent atelectasis with pneumonia not excluded.   LOCATION:  [facility]      Dictated by (CST): Jamshid Silverio MD on 4/13/2025 at 9:30 PM     Finalized by (CST): Jamshid Silverio MD on 4/13/2025 at 9:31 PM       XR HIP W OR WO PELVIS 2 OR 3 VIEWS, LEFT (CPT=73502)  Result Date: 4/13/2025  PROCEDURE:  XR HIP W OR WO PELVIS 2 OR 3 VIEWS, LEFT (CPT=73502)  TECHNIQUE:  Unilateral 2 to 3 views of the hip and pelvis if performed.  COMPARISON:  None.  INDICATIONS:  FALL, R/O PELVIC/HIP FX  PATIENT STATED HISTORY: (As transcribed by Technologist)  Patient stated falling and has left hip pain.    FINDINGS:  BONES:  There is joint space narrowing in the left hip with marginal osteophyte formation.  There is no acute fracture. SOFT TISSUES:  Negative.  No visible soft tissue swelling. EFFUSION:  None visible. OTHER:  Negative.            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  There is no acute fracture detected.   LOCATION:  [facility]   Dictated by ([facility] [[email or web address]]): Jamshid Silverio MD on 4/13/2025 at 9:29 PM     Finalized by (CST): Jamshid Silverio MD on 4/13/2025 at 9:30 PM       XR SHOULDER, COMPLETE (MIN 2 VIEWS), RIGHT (CPT=73030)  Result Date: 4/13/2025  PROCEDURE:  XR SHOULDER, COMPLETE (MIN 2 VIEWS), RIGHT (CPT=73030)  TECHNIQUE:  Multiple views were obtained.  COMPARISON:  [facility] , CT, CT SPINE CERVICAL (CPT=72125), 4/13/2025, 8:14 PM.  INDICATIONS:  R/O FX  PATIENT STATED HISTORY: (As transcribed by Technologist)  Patient stated falling and has right anterior shoulder pain.    FINDINGS:  There is no acute fracture detected.  There is extensive calcium hydroxyapatite deposition along rotator cuff consistent with calcific tendinopathy.  There is osteoarthritis of the  glenohumeral joint and the acromioclavicular joint.            CONCLUSION:  There are degenerative changes and there are findings of calcific tendinopathy.  There is no acute fracture detected.   LOCATION:  [facility]   Dictated by (CST): Jamshid Silverio MD on 4/13/2025 at 9:20 PM     Finalized by (CST): Jamshid Silverio MD on 4/13/2025 at 9:21 PM       CT SPINE CERVICAL (CPT=72125)  Result Date: 4/13/2025  PROCEDURE:  CT SPINE CERVICAL (CPT=72125)  COMPARISON:  None.  INDICATIONS:  FALL, R/O CSPINE INJURY  TECHNIQUE:  Noncontrast CT scanning of the cervical spine is performed from the skull base through C7.  Multiplanar reconstructions are generated.  Dose reduction techniques were used. Dose information is transmitted to the ACR (American College of Radiology) NRDR (National Radiology Data Registry) which includes the Dose Index Registry.  PATIENT STATED HISTORY: (As transcribed by Technologist)  Fall with weakness.    FINDINGS:  CRANIOCERVICAL AREA:  There is degenerative change at the anterior atlantoaxial joint. PARASPINAL AREA:  Normal with no visible mass.  BONES:  No fracture, pars defect, or osseous lesion.  CERVICAL DISC LEVELS: C2-C3:  No significant disc/facet abnormality, spinal stenosis, or foraminal stenosis. C3-C4:  Diffuse endplate osteophyte and facet hypertrophy cause mild central canal and moderate bilateral foraminal stenosis. C4-C5:  There is diffuse endplate osteophyte.  There is ossification of the posterior longitudinal ligament.  There is ossification of the ligamentum flavum.  There is facet hypertrophy.  There is severe central canal stenosis.  There is moderate bilateral foraminal stenosis. C5-C6:  Diffuse endplate osteophyte and bilateral uncovertebral joint osteophytes cause mild central canal stenosis and moderate bilateral foraminal stenosis. C6-C7:  Diffuse endplate osteophyte and bilateral uncovertebral joint osteophytes cause mild central canal and moderate bilateral foraminal stenosis.  C7-T1:  Diffuse endplate osteophyte and bilateral uncovertebral joint osteophytes cause mild central canal and bilateral foraminal stenosis.            CONCLUSION:  There is multilevel degenerative disc disease in the cervical spine which is described in detail level by level above.  There is suggestion of severe stenosis of the central canal at C4-C5.  There is no acute fracture detected.    LOCATION:  [facility]   Dictated by (CST): Jamshid Silverio MD on 4/13/2025 at 8:47 PM     Finalized by (CST): Jamshid Silverio MD on 4/13/2025 at 8:50 PM       CT BRAIN OR HEAD (CPT=70450)  Result Date: 4/13/2025  PROCEDURE:  CT BRAIN OR HEAD (73740)  COMPARISON:  [facility] , CT, CT BRAIN OR HEAD (33410), 3/19/2021, 12:40 PM.  INDICATIONS:  FALL, R/O ICH  TECHNIQUE:  Noncontrast CT scanning is performed through the brain. Dose reduction techniques were used. Dose information is transmitted to the ACR (American College of Radiology) NRDR (National Radiology Data Registry) which includes the Dose Index Registry.  PATIENT STATED HISTORY: (As transcribed by Technologist)  Fall with weakness.    FINDINGS:  VENTRICLES/SULCI:  Ventricles and sulci are prominent compatible with atrophy. INTRACRANIAL:  There is multifocal and confluent decreased attenuation in periventricular white matter which is consistent with chronic small vessel ischemic change.  There is no specific evidence of acute ischemia, hemorrhage or mass. SINUSES:           No sign of acute sinusitis.  MASTOIDS:          No sign of acute inflammation. SKULL:             No evidence for fracture or osseous abnormality. OTHER:             None.            CONCLUSION:  1. There is chronic small vessel ischemic change and atrophy noted. 2. There is no acute abnormality detected on the noncontrast CT of the head.     LOCATION:  [facility]   Dictated by (CST): Jamshid Silverio MD on 4/13/2025 at 8:44 PM     Finalized by (CST): Jamshid Silverio MD on 4/13/2025 at 8:47 PM         Operative  reports:      Hospital course:    Patient is a89 year old female with PMH HTN, CKD, DM, HL who presents from [facility] for elevated Sodium levels.       Hypernatremia  JOSE ANTONIO on CKD4 in setting of long-standing HTN, diabetic nephropathy and advanced age. Baseline Cr 2.4-2.9  - Sodium 150, creatinine 4.23 on admission. Imrpoving with IVF  - Mental status- per daughter, still off of baseline although recently she seems to have been declining.  GOC discussions with palliative -plan for hospice placement     History of recurrent UTI  -Recent E. Coli  -UA positive however cx negative    Will stop abx     HTN  - prn hydralazine  - metop      Type 2 diabetes mellitus, controlled A1c 6.0   No treatment needed     Constipation  - bowel regimen        Day of discharge exam:  Vitals:    05/02/25 1120   BP: (!) 186/49   Pulse: 55   Resp: 16   Temp: 97.9 °F (36.6 °C)     Dw daughter at , plan [facility] hospice at [facility]    Pt intermittently awak  Abd soft, nt  RRR  No edema    Total time coordinating care 32 min      Patient and/or family had opportunity to ask questions and expressed understanding and agreement with therapeutic plan as outlined         Trevor Paez Hospitalist  [phone]  Answering Service: [phone]

## 2025-05-02 NOTE — CONGREGATE LIVING REVIEW
Congregate Living Authorization    The [facility] Review Committee (CLRC) has reviewed this case and the committee DOES NOT RECOMMEND discharge to a skilled nursing facility under skilled care.    The CLRC recommends:  Hospice care       For questions regarding CLRC approval process, please contact the CM assigned to the case.  For questions regarding RN discharge workflow, please contact the unit Clinical Leader.

## 2025-05-02 NOTE — PROGRESS NOTES
AO x1 which is BL. Nonverbal. 2L at rest. Tele NSR. Heparin subcutaneous. Purewick. Incontinent. No pain. Mepilax on sacrum. Max assist. QIDaccuchecks. IVF infusing per MAR. Pt resting in bed with call light in reach. No further needs

## 2025-05-02 NOTE — DIETARY MALNUTRITION NOTE
[facility]   part of [facility]  NUTRITION ASSESSMENT    Pt meets severe malnutrition criteria at this time.    CRITERIA FOR MALNUTRITION DIAGNOSIS:  Criteria for severe malnutrition diagnosis: acute illness/injury related to wt loss greater than 5% in 1 month and energy intake less than 50% for greater than 5 days    NUTRITION INTERVENTION:    RD nutrition Care Plan- Recommend EN (enteral nutrition) support if unable to take adequate PO safely within 1-2 days  Meal and Snacks - ADAT per SLP recommendation; monitor patient po intake. Encourage adequate po of appropriate diet.  Medical Food Supplements - RD added Ensure Plus HP BID once diet advanced. Rationale/use for oral supplements discussed.  Enteral Nutrition - If pt remains NPO and consistent with GOC, recommend placing feeding tube and initiating TF.  Vitamin and Mineral Supplements - Recommend adding Multivitamin with minerals once diet advanced.  Coordination of Nutrition Care - Recommend SLP consult prior to diet advancement. and Palliative care consult for goal of care    PATIENT STATUS:   5/2- Noted hospice decision pending. No po intakes noted currently NPO.Noted may do pleasure feeds even with asp risk.  Remains confused and non-verbal. Follow for GOC.     4/30- 89 year old female admitted on 4/29 presents with hypernatremia. Pt is AAOx1 at baseline and nonverbal. Noted pt recently here and last assessed by RD 4/22. Pt with poor appetite/PO intake during previous admit which likely continued at SNF given hypernatremic upon admit. SLP evaluated today and recommends NPO d/t not following commands consistently (she was on pureed diet with nectar thick liquids at SNF). No GI symptoms noted with last BM 4/29. NKFA. Pt ordered Ensure Plus HP during last admit - will reorder once diet advanced. If pt remains NPO, recommend palliative care consult to address GOC as pt may require feeding tube. Will continue to monitor and follow up as  appropriate.    PMH: Anemia, Dementia, Depression, Esophageal reflux, MADHU, hyperlipidemia, Type II DM, essential hypertension    ANTHROPOMETRICS:  Ht:  4'9\"  Wt: 54.6 kg (120 lb 5.9 oz).   BMI: Body mass index is 26.05 kg/m².  IBW: 42 kg    WEIGHT HISTORY: Per chart, pt with ~7 lb wt loss x 1 month (5.5%, significant per standards).  Patient Weight(s) for the past 336 hrs:   Weight   04/30/25 0211 54.6 kg (120 lb 5.9 oz)   04/29/25 2235 54.6 kg (120 lb 6.4 oz)   04/29/25 1749 54.8 kg (120 lb 13 oz)       Wt Readings from Last 5 Encounters:   04/30/25 54.6 kg (120 lb 5.9 oz)   04/22/25 54.8 kg (120 lb 12.8 oz)   03/29/23 61.2 kg (135 lb)   07/05/22 61.3 kg (135 lb 2 oz)   11/03/21 59.9 kg (132 lb)   Per Care Everywhere:  03/25/25 58 kg (127 lb)   12/30/24 59 kg (130 lb)  04/18/24 59.4 kg (131 lb)    NUTRITION:  Diet:       Procedures    NPO      Food Allergies: No  Cultural/Ethnic/Roman [employer] Preferences Addressed: Yes    Percent Meals Eaten (last 3 days)       None          GI SYSTEM REVIEW: WNL; last BM 4/29  Skin/Wounds: sacral wound    NUTRITION RELATED PHYSICAL FINDINGS:     1. Body Fat/Muscle Mass: unable to assess     2. Fluid Accumulation: none per RN documentation    NUTRITION PRESCRIPTION:  54.6 kg Actual Body Weight  Calories: 4761-0312 calories/day (25-30 kcal/kg)  Protein: 55-82 grams protein/day (1.0-1.5 gm/kg)  Fluid: ~1 ml/kcal or per MD discretion    NUTRITION DIAGNOSIS/PROBLEM:  Malnutrition related to insufficient appetite resulting in inadequate nutrition intake and inability to take or tolerate as evidenced by documented/reported insufficient oral intake and documented/reported unintentional weight loss    MONITOR AND EVALUATE/NUTRITION GOALS:  Weight stable within 1 to 2 lbs during admission - Ongoing  Start alternative nutrition in 24-48 hrs if diet is not able to advance- Not met, Continues    MEDICATIONS:  Abx, novolog, dialyvite, senokot  Gtt: D5W at 125 ml/hr    LABS:  reviewed    Pt is at  High nutrition risk    Jemima Davies RD, LDN  Clinical Dietitian

## 2025-05-02 NOTE — HOSPICE RN NOTE
[facility] nursing visit for follow up on hospice meeting of yesterday. Met with Rubi BURLESON. Reviewed hospice benefit with questions and concerns addressed. Hospice consents and POLST signed by Rubi. Waiting to hear from [facility] when they can accept patient. Hospice will move forward with DC planning when accepted. Hospice will continue to follow closely. Please call [facility] with any questions or concerns.    Kimberly Allison RN, PN  [facility] Liaison  [phone] after hours

## 2025-05-02 NOTE — PROGRESS NOTES
Notes reviewed.  Pt transitioning to hospice care. Nephrology will sign off.        Sukhi Cantrell MD  5/2/2025  10:47 AM

## 2025-05-02 NOTE — SLP NOTE
Patient to discharge on hospice.  SLP will sign off at this time.   Mary Bañuelos MS CCC-SLP/L  Speech-Language Pathologist  [facility] [phone]

## 2025-05-02 NOTE — PROGRESS NOTES
[facility] set up transport to [facility] at 330p with [facility] admit at facility.    [facility]  [phone]

## 2025-05-02 NOTE — PLAN OF CARE
NURSING DISCHARGE NOTE    Discharged Rehab facility via Ambulance.  Accompanied by Support staff  Belongings Taken by patient/family      PIV removed. Navigator complete. Discharge paperwork sent to facility via ambulance. Report given to Adenike nuñez [facility].         Problem: Patient/Family Goals  Goal: Patient/Family Long Term Goal  Description: Patient's Long Term Goal: discharge  Interventions:- follow poc  - See additional Care Plan goals for specific interventions  Outcome: Progressing     Problem: Patient/Family Goals  Goal: Patient/Family Short Term Goal  Description: Patient's Short Term Goal:   4/29 NOC: rest, lower BP  Interventions: -   4/29 NOC: prn meds    - See additional Care Plan goals for specific interventions  Outcome: Progressing

## 2025-05-05 NOTE — PROGRESS NOTES
Physician Clarification    Additional information related to the patient's nutritional status    Severe protein malnutrition        This note is part of the patient's medical record.

## 2025-05-07 ENCOUNTER — TELEPHONE (OUTPATIENT)
Dept: NEPHROLOGY | Facility: CLINIC | Age: OVER 89
End: 2025-05-07

## 2025-05-07 NOTE — TELEPHONE ENCOUNTER
Pt's daughter, Rubi, is requesting a call. Rubi is needing to talk to you. There was no specification as to why.

## (undated) NOTE — ED AVS SNAPSHOT
Britta Hayden   MRN: [id number]    Department:  [facility] Emergency Department   Date of Visit:  11/19/2017           Disclosure     Insurance plans vary and the physician(s) referred by the ER may not be covered by your plan.  Please contact If you have been prescribed any medication(s), please fill your prescription right away and begin taking the medication(s) as directed    If the emergency physician has read X-rays, these will be re-interpreted by a radiologist.  If there is a significant

## (undated) NOTE — LETTER
Date & Time: 4/25/2018, 5:41 AM  Patient: Maco Sandhu  Encounter Provider(s):    René Esquivel MD         This certifies that I, Maco Sandhu, a patient at an [facility], am leaving the facility voluntarily and again

## (undated) NOTE — ED AVS SNAPSHOT
Jeana Juniorlois   MRN: [id number]    Department:  [facility] Emergency Department   Date of Visit:  4/25/2018           Disclosure     Insurance plans vary and the physician(s) referred by the ER may not be covered by your plan.  Please contact y tell this physician (or your personal doctor if your instructions are to return to your personal doctor) about any new or lasting problems. The primary care or specialist physician will see patients referred from the [facility] Emergency Department.  Myra Mondragon

## (undated) NOTE — ED AVS SNAPSHOT
Sybil Yap   MRN: [id number]    Department:  [facility] Emergency Department   Date of Visit:  5/3/2018           Disclosure     Insurance plans vary and the physician(s) referred by the ER may not be covered by your plan.  Please contact yo tell this physician (or your personal doctor if your instructions are to return to your personal doctor) about any new or lasting problems. The primary care or specialist physician will see patients referred from the [facility] Emergency Department.  Myra Mondragon

## (undated) NOTE — ED AVS SNAPSHOT
Marilyn Posey   MRN: [id number]    Department:  [facility] Emergency Department   Date of Visit:  8/16/2019           Disclosure     Insurance plans vary and the physician(s) referred by the ER may not be covered by your plan.  Please contact y tell this physician (or your personal doctor if your instructions are to return to your personal doctor) about any new or lasting problems. The primary care or specialist physician will see patients referred from the [facility] Emergency Department.  Myra Mondragon